# Patient Record
Sex: MALE | Race: WHITE | NOT HISPANIC OR LATINO | URBAN - METROPOLITAN AREA
[De-identification: names, ages, dates, MRNs, and addresses within clinical notes are randomized per-mention and may not be internally consistent; named-entity substitution may affect disease eponyms.]

---

## 2017-06-19 ENCOUNTER — EMERGENCY (EMERGENCY)
Facility: HOSPITAL | Age: 47
LOS: 1 days | End: 2017-06-19
Payer: COMMERCIAL

## 2017-06-19 PROCEDURE — 71010: CPT | Mod: 26

## 2017-06-19 PROCEDURE — 70450 CT HEAD/BRAIN W/O DYE: CPT | Mod: 26

## 2017-06-19 PROCEDURE — 99284 EMERGENCY DEPT VISIT MOD MDM: CPT

## 2020-02-11 ENCOUNTER — INPATIENT (INPATIENT)
Facility: HOSPITAL | Age: 50
LOS: 1 days | Discharge: HOME | End: 2020-02-13
Payer: COMMERCIAL

## 2020-02-11 PROCEDURE — 99285 EMERGENCY DEPT VISIT HI MDM: CPT

## 2020-02-11 PROCEDURE — 71045 X-RAY EXAM CHEST 1 VIEW: CPT | Mod: 26

## 2020-02-11 PROCEDURE — 76705 ECHO EXAM OF ABDOMEN: CPT | Mod: 26

## 2020-02-12 ENCOUNTER — OUTPATIENT (OUTPATIENT)
Dept: OUTPATIENT SERVICES | Facility: HOSPITAL | Age: 50
LOS: 1 days | End: 2020-02-12

## 2020-02-13 ENCOUNTER — OUTPATIENT (OUTPATIENT)
Dept: OUTPATIENT SERVICES | Facility: HOSPITAL | Age: 50
LOS: 1 days | End: 2020-02-13

## 2020-07-05 ENCOUNTER — APPOINTMENT (OUTPATIENT)
Dept: DISASTER EMERGENCY | Facility: CLINIC | Age: 50
End: 2020-07-05

## 2020-07-05 PROBLEM — Z00.00 ENCOUNTER FOR PREVENTIVE HEALTH EXAMINATION: Status: ACTIVE | Noted: 2020-07-05

## 2020-07-05 LAB — SARS-COV-2 N GENE NPH QL NAA+PROBE: NOT DETECTED

## 2020-07-08 ENCOUNTER — OUTPATIENT (OUTPATIENT)
Dept: OUTPATIENT SERVICES | Facility: HOSPITAL | Age: 50
LOS: 1 days | End: 2020-07-08

## 2020-08-02 ENCOUNTER — APPOINTMENT (OUTPATIENT)
Dept: DISASTER EMERGENCY | Facility: CLINIC | Age: 50
End: 2020-08-02

## 2020-08-02 DIAGNOSIS — Z01.818 ENCOUNTER FOR OTHER PREPROCEDURAL EXAMINATION: ICD-10-CM

## 2020-08-02 LAB — SARS-COV-2 N GENE NPH QL NAA+PROBE: NOT DETECTED

## 2020-08-05 ENCOUNTER — OUTPATIENT (OUTPATIENT)
Dept: OUTPATIENT SERVICES | Facility: HOSPITAL | Age: 50
LOS: 1 days | End: 2020-08-05

## 2020-12-23 ENCOUNTER — APPOINTMENT (OUTPATIENT)
Dept: NEUROLOGY | Facility: CLINIC | Age: 50
End: 2020-12-23
Payer: COMMERCIAL

## 2020-12-23 VITALS
HEART RATE: 110 BPM | BODY MASS INDEX: 31.08 KG/M2 | HEIGHT: 75 IN | WEIGHT: 250 LBS | DIASTOLIC BLOOD PRESSURE: 74 MMHG | TEMPERATURE: 97.6 F | SYSTOLIC BLOOD PRESSURE: 108 MMHG

## 2020-12-23 VITALS — HEART RATE: 130 BPM | DIASTOLIC BLOOD PRESSURE: 79 MMHG | SYSTOLIC BLOOD PRESSURE: 130 MMHG

## 2020-12-23 VITALS — DIASTOLIC BLOOD PRESSURE: 77 MMHG | HEART RATE: 106 BPM | SYSTOLIC BLOOD PRESSURE: 131 MMHG

## 2020-12-23 DIAGNOSIS — Z80.1 FAMILY HISTORY OF MALIGNANT NEOPLASM OF TRACHEA, BRONCHUS AND LUNG: ICD-10-CM

## 2020-12-23 DIAGNOSIS — R41.89 OTHER SYMPTOMS AND SIGNS INVOLVING COGNITIVE FUNCTIONS AND AWARENESS: ICD-10-CM

## 2020-12-23 DIAGNOSIS — Z78.9 OTHER SPECIFIED HEALTH STATUS: ICD-10-CM

## 2020-12-23 DIAGNOSIS — R56.9 UNSPECIFIED CONVULSIONS: ICD-10-CM

## 2020-12-23 PROCEDURE — 99204 OFFICE O/P NEW MOD 45 MIN: CPT

## 2020-12-23 PROCEDURE — 99072 ADDL SUPL MATRL&STAF TM PHE: CPT

## 2020-12-23 RX ORDER — ASPIRIN 81 MG
81 TABLET, DELAYED RELEASE (ENTERIC COATED) ORAL
Refills: 0 | Status: ACTIVE | COMMUNITY

## 2020-12-23 RX ORDER — INSULIN ASPART 100 [IU]/ML
INJECTION, SOLUTION INTRAVENOUS; SUBCUTANEOUS
Refills: 0 | Status: ACTIVE | COMMUNITY

## 2020-12-23 RX ORDER — ROSUVASTATIN CALCIUM 5 MG/1
5 TABLET, FILM COATED ORAL
Refills: 0 | Status: ACTIVE | COMMUNITY

## 2020-12-23 NOTE — HISTORY OF PRESENT ILLNESS
[FreeTextEntry1] : 50-year-old man right-handed history of diabetes and paroxysmal atrial fibrillation, and with his partner because of several months if not couple years old changes in behavior, more forgetful, inattentive, sometimes bizarre behavior. Difficulty with sleep, heavy snoring, symptoms will wake up, confused, babbling talking to people or not they appeared recurrent episodes of falls as well as transient loss of consciousness,usually brief period predominantly occurring when he stands up from a lying down or sitting down position, he reports feeling dizzy unsteady, usually improves as she walks. No history of tongue biting,or occasionally has sold himself with urine.no prior history of seizures, his  has noted sometimes tremor or shaking during some of his events. No associated episodes of transient loss of vision, unilateral weakness or numbness of extremities.\par He has history of diabetic neuropathy, numbness and tingling feelings of his feet, now his legs. No back pain going down the legs, or incontinence. His walking has slowed, mainly because of difficulty with balance.

## 2020-12-23 NOTE — DISCUSSION/SUMMARY
[FreeTextEntry1] : 50-year-old man with a history of diabetes, paroxysmal atrial fibrillation, complaining of recurrent episodes of syncopal episodes, likely postural related, rule out seizures. Evidence of orthostatic hypotension.ossibly diabetic autonomic polyneuropathy.\par Changes in behavior, forgetfulness, raises the possibility of an underlying cognitive disorder, early dementia, lateral and any intracranial pathology, hydrocephalus.\par History of heavy snoring, fatigue and tiredness, sometimes with acting out of dream as, possibly due to REM sleep disorder.\par advice to followup with his cardiologist, for possible treatment with orthostatic hypotension. Rule out intermittent A. fib as cause of his recurrent syncopal episodes.\par Plan: MRI of brain without contrast.\par 24 hour laboratory EEG\par Sleep study\par Referred for neuropsychological testing\par Return after the above.\par

## 2020-12-23 NOTE — REVIEW OF SYSTEMS
[Sleep Disturbances] : sleep disturbances [Anxiety] : anxiety [Depression] : depression [Change In Personality] : personality change [As Noted in HPI] : as noted in HPI [Confused or Disoriented] : confusion [Decr. Concentrating Ability] : decreased concentrating ability [Changed Thought Patterns] : changed thought patterns [Numbness] : numbness [Tingling] : tingling [Abnormal Sensation] : an abnormal sensation [Dizziness] : dizziness [Fainting] : fainting [Frequent Falls] : frequent falls [Negative] : Heme/Lymph [Emotional Problems] : no emotional problems [Arm Weakness] : no arm weakness [Hand Weakness] : no hand weakness [Leg Weakness] : no leg weakness [Difficulty Writing] : no difficulty writing [Difficulties in Speech] : no speech difficulties [Migraine Headache] : no migraine headache

## 2020-12-23 NOTE — PHYSICAL EXAM
[General Appearance - Alert] : alert [General Appearance - In No Acute Distress] : in no acute distress [Oriented To Time, Place, And Person] : oriented to person, place, and time [Impaired Insight] : insight and judgment were intact [Affect] : the affect was normal [Person] : oriented to person [Place] : oriented to place [Time] : oriented to time [Concentration Intact] : normal concentrating ability [Visual Intact] : visual attention was ~T not ~L decreased [Naming Objects] : no difficulty naming common objects [Repeating Phrases] : no difficulty repeating a phrase [Writing A Sentence] : no difficulty writing a sentence [Fluency] : fluency intact [Comprehension] : comprehension intact [Reading] : reading intact [Past History] : adequate knowledge of personal past history [Cranial Nerves Optic (II)] : visual acuity intact bilaterally,  visual fields full to confrontation, pupils equal round and reactive to light [Cranial Nerves Oculomotor (III)] : extraocular motion intact [Cranial Nerves Trigeminal (V)] : facial sensation intact symmetrically [Cranial Nerves Facial (VII)] : face symmetrical [Cranial Nerves Vestibulocochlear (VIII)] : hearing was intact bilaterally [Cranial Nerves Glossopharyngeal (IX)] : tongue and palate midline [Cranial Nerves Accessory (XI - Cranial And Spinal)] : head turning and shoulder shrug symmetric [Cranial Nerves Hypoglossal (XII)] : there was no tongue deviation with protrusion [Motor Strength] : muscle strength was normal in all four extremities [No Muscle Atrophy] : normal bulk in all four extremities [Motor Handedness Right-Handed] : the patient is right hand dominant [Sensation Tactile Decrease] : light touch was intact [Romberg's Sign] : a positive Romberg's sign was present [Vibration Decrease Leg / Foot Toes Both Feet] : decreased at the toes of both feet  [2+] : Ankle jerk left 2+ [Paresis Pronator Drift Right-Sided] : no pronator drift on the right [Past-pointing] : there was no past-pointing [Tremor] : no tremor present [Dysdiadochokinesia Bilaterally] : not present [Coordination - Dysmetria Impaired Finger-to-Nose Bilateral] : not present [1+] : Biceps left 1+ [0] : Ankle jerk right 0 [Plantar Reflex Right Only] : normal on the right [Plantar Reflex Left Only] : normal on the left [___] : absent on the right [___] : absent on the left [FreeTextEntry8] : wide-based gait [Sclera] : the sclera and conjunctiva were normal [PERRL With Normal Accommodation] : pupils were equal in size, round, reactive to light, with normal accommodation [Extraocular Movements] : extraocular movements were intact [Full Visual Field] : full visual field [Outer Ear] : the ears and nose were normal in appearance [Hearing Threshold Finger Rub Not [place]] : hearing was normal [Neck Appearance] : the appearance of the neck was normal [] : the neck was supple [Neck Cervical Mass (___cm)] : no neck mass was observed [Nonpitting Edema] : nonpitting edema present [___ +] : bilateral [unfilled]+ pitting edema to the ankles [No Spinal Tenderness] : no spinal tenderness [Nail Clubbing] : no clubbing  or cyanosis of the fingernails [Musculoskeletal - Swelling] : no joint swelling seen [Motor Tone] : muscle strength and tone were normal [Skin Color & Pigmentation] : normal skin color and pigmentation [Skin Turgor] : normal skin turgor

## 2021-01-18 ENCOUNTER — APPOINTMENT (OUTPATIENT)
Dept: MRI IMAGING | Facility: CLINIC | Age: 51
End: 2021-01-18
Payer: COMMERCIAL

## 2021-01-18 PROCEDURE — 70551 MRI BRAIN STEM W/O DYE: CPT

## 2021-02-09 ENCOUNTER — TRANSCRIPTION ENCOUNTER (OUTPATIENT)
Age: 51
End: 2021-02-09

## 2021-02-09 ENCOUNTER — APPOINTMENT (OUTPATIENT)
Dept: NEUROLOGY | Facility: CLINIC | Age: 51
End: 2021-02-09
Payer: COMMERCIAL

## 2021-02-09 PROCEDURE — 95719 EEG PHYS/QHP EA INCR W/O VID: CPT

## 2021-02-09 PROCEDURE — 95708 EEG WO VID EA 12-26HR UNMNTR: CPT

## 2021-02-09 PROCEDURE — 99072 ADDL SUPL MATRL&STAF TM PHE: CPT

## 2021-02-09 PROCEDURE — 95700 EEG CONT REC W/VID EEG TECH: CPT

## 2021-02-12 ENCOUNTER — APPOINTMENT (OUTPATIENT)
Dept: NEUROLOGY | Facility: CLINIC | Age: 51
End: 2021-02-12
Payer: COMMERCIAL

## 2021-02-12 ENCOUNTER — APPOINTMENT (OUTPATIENT)
Dept: NEUROLOGY | Facility: CLINIC | Age: 51
End: 2021-02-12

## 2021-02-12 PROCEDURE — 99072 ADDL SUPL MATRL&STAF TM PHE: CPT

## 2021-02-12 PROCEDURE — 95816 EEG AWAKE AND DROWSY: CPT

## 2021-02-19 ENCOUNTER — APPOINTMENT (OUTPATIENT)
Dept: NEUROLOGY | Facility: CLINIC | Age: 51
End: 2021-02-19

## 2021-02-22 ENCOUNTER — APPOINTMENT (OUTPATIENT)
Dept: NEUROLOGY | Facility: CLINIC | Age: 51
End: 2021-02-22

## 2021-03-01 ENCOUNTER — APPOINTMENT (OUTPATIENT)
Dept: NEUROLOGY | Facility: CLINIC | Age: 51
End: 2021-03-01
Payer: COMMERCIAL

## 2021-03-01 DIAGNOSIS — F32.9 MAJOR DEPRESSIVE DISORDER, SINGLE EPISODE, UNSPECIFIED: ICD-10-CM

## 2021-03-01 PROCEDURE — 99072 ADDL SUPL MATRL&STAF TM PHE: CPT

## 2021-03-01 PROCEDURE — 99214 OFFICE O/P EST MOD 30 MIN: CPT

## 2021-03-01 PROCEDURE — 95806 SLEEP STUDY UNATT&RESP EFFT: CPT

## 2021-03-01 NOTE — DATA REVIEWED
[de-identified] :  EXAM:  MR BRAIN  \par \par \par PROCEDURE DATE:  01/18/2021  \par  \par \par \par INTERPRETATION:  CLINICAL INDICATION: Falling\par \par \par Magnetic resonance imaging of the brain was carried out with transaxial SPGR, FLAIR, fast spin echo T2 weighted images, axial susceptibility weighted series, diffusion weighted series and sagittal T1 weighted series on a 1.5 [place] magnet.\par \par Comparison is made with the prior brain CT of 6/19/2017.\par \par Ventricular and sulcal prominence the patient's age suggests mild volume loss. Scattered small vessel white matter ischemic changes are noted. No acute infarcts or hemorrhage are identified. There are no intracranial masses. There is a right anterior temporal arachnoid cyst. The sellar and parasellar structures are unremarkable.\par \par There is a small right mastoid effusion.\par \par There has been previous bilateral lens replacement surgery.\par \par \par IMPRESSION: Volume loss for the patient's age. Small vessel white matter ischemic changes. No acute infarcts hemorrhage or mass. Right anterior temporal arachnoid cyst.\par \par  [de-identified] : electroencephalograph performed February 19 0.41. Impression: Normal EEG study wake and drowsy states.

## 2021-03-01 NOTE — DISCUSSION/SUMMARY
[FreeTextEntry1] : 50-year-old right-handed man the trouble with memory, appears depressed, referred for neurocognitive testing, but has not made the appointment.\par We'll request PET scan, rule out underlying dementia.\par  Start Pristiq 50 mg po QD\par Obtain neurocognitive testing.\par Past appointment for a sleep study today.\par Return to office, one month \par \par

## 2021-03-01 NOTE — PHYSICAL EXAM
[General Appearance - Alert] : alert [General Appearance - In No Acute Distress] : in no acute distress [FreeTextEntry1] : flat affect [Person] : oriented to person [Place] : oriented to place [Time] : oriented to time [Concentration Intact] : normal concentrating ability [Repeating Phrases] : no difficulty repeating a phrase [Fluency] : fluency intact [Cranial Nerves Optic (II)] : visual acuity intact bilaterally,  visual fields full to confrontation, pupils equal round and reactive to light [Cranial Nerves Oculomotor (III)] : extraocular motion intact [Cranial Nerves Trigeminal (V)] : facial sensation intact symmetrically [Cranial Nerves Facial (VII)] : face symmetrical [Cranial Nerves Vestibulocochlear (VIII)] : hearing was intact bilaterally [Cranial Nerves Glossopharyngeal (IX)] : tongue and palate midline [Cranial Nerves Accessory (XI - Cranial And Spinal)] : head turning and shoulder shrug symmetric [Cranial Nerves Hypoglossal (XII)] : there was no tongue deviation with protrusion [Motor Tone] : muscle tone was normal in all four extremities [Motor Strength] : muscle strength was normal in all four extremities [No Muscle Atrophy] : normal bulk in all four extremities [Motor Handedness Right-Handed] : the patient is right hand dominant [Sensation Tactile Decrease] : light touch was intact [Vibration Decrease Leg / Foot Toes Both Feet] : decreased at the toes of both feet  [Abnormal Walk] : normal gait [Dysdiadochokinesia Bilaterally] : not present [1+] : Biceps left 1+ [0] : Ankle jerk right 0 [2+] : Ankle jerk left 2+ [Plantar Reflex Right Only] : normal on the right [Plantar Reflex Left Only] : normal on the left [___] : absent on the right [___] : absent on the left [FreeTextEntry8] : wide-based gait

## 2021-03-01 NOTE — HISTORY OF PRESENT ILLNESS
[FreeTextEntry1] : 50-year-old right-handed man with a history of problem with memory, poor motivation, inattentiveness. Worsening of his 8 months to a year and a half, no history of trauma or injury, history of stroke seizures.\par Patiently trouble sleeping, heavy snoring not throughout the day, spouse complains he talks in his sleep nightly.\par Denies headache, no dizzy spells, at times trouble walking, and falling.\par Recent MRI of the brain demonstrates atrophy, periventricular white matter changes. Normal EEG.

## 2021-03-12 ENCOUNTER — INPATIENT (INPATIENT)
Facility: HOSPITAL | Age: 51
LOS: 2 days | Discharge: HOME | End: 2021-03-15
Admitting: FAMILY MEDICINE
Payer: COMMERCIAL

## 2021-03-12 ENCOUNTER — OUTPATIENT (OUTPATIENT)
Dept: OUTPATIENT SERVICES | Facility: HOSPITAL | Age: 51
LOS: 1 days | End: 2021-03-12

## 2021-03-12 PROCEDURE — 73130 X-RAY EXAM OF HAND: CPT | Mod: 26,LT

## 2021-03-12 PROCEDURE — 71045 X-RAY EXAM CHEST 1 VIEW: CPT | Mod: 26

## 2021-03-12 PROCEDURE — 70450 CT HEAD/BRAIN W/O DYE: CPT | Mod: 26

## 2021-03-12 PROCEDURE — 73564 X-RAY EXAM KNEE 4 OR MORE: CPT | Mod: 26,LT

## 2021-03-12 PROCEDURE — 73564 X-RAY EXAM KNEE 4 OR MORE: CPT | Mod: 26,RT,77

## 2021-03-12 PROCEDURE — 93010 ELECTROCARDIOGRAM REPORT: CPT

## 2021-03-12 PROCEDURE — 99285 EMERGENCY DEPT VISIT HI MDM: CPT

## 2021-03-13 ENCOUNTER — OUTPATIENT (OUTPATIENT)
Dept: OUTPATIENT SERVICES | Facility: HOSPITAL | Age: 51
LOS: 1 days | End: 2021-03-13

## 2021-03-13 PROCEDURE — 70547 MR ANGIOGRAPHY NECK W/O DYE: CPT | Mod: 26

## 2021-03-13 PROCEDURE — 73718 MRI LOWER EXTREMITY W/O DYE: CPT | Mod: 26,LT

## 2021-03-13 PROCEDURE — 70551 MRI BRAIN STEM W/O DYE: CPT | Mod: 26

## 2021-03-14 ENCOUNTER — OUTPATIENT (OUTPATIENT)
Dept: OUTPATIENT SERVICES | Facility: HOSPITAL | Age: 51
LOS: 1 days | End: 2021-03-14

## 2021-03-15 ENCOUNTER — OUTPATIENT (OUTPATIENT)
Dept: OUTPATIENT SERVICES | Facility: HOSPITAL | Age: 51
LOS: 1 days | End: 2021-03-15

## 2021-04-06 ENCOUNTER — APPOINTMENT (OUTPATIENT)
Dept: NEUROLOGY | Facility: CLINIC | Age: 51
End: 2021-04-06
Payer: COMMERCIAL

## 2021-04-06 VITALS — HEIGHT: 75 IN | WEIGHT: 250 LBS | TEMPERATURE: 97.8 F | BODY MASS INDEX: 31.08 KG/M2

## 2021-04-06 DIAGNOSIS — G47.52 REM SLEEP BEHAVIOR DISORDER: ICD-10-CM

## 2021-04-06 PROCEDURE — 99214 OFFICE O/P EST MOD 30 MIN: CPT

## 2021-04-06 PROCEDURE — 99072 ADDL SUPL MATRL&STAF TM PHE: CPT

## 2021-04-06 NOTE — DATA REVIEWED
[de-identified] :  EXAM:  MR ANGIO NECK      \par EXAM:  MR BRAIN      \par \par PROCEDURE DATE:  03/13/2021  \par \par \par \par \par INTERPRETATION:  Clinical indication: Syncope.\par \par MRI of the brain was performed using sagittal T1, axial T1 and fast spin-echo T2-weighted sequence of FLAIR diffusion and susceptibility weighted sequence.\par \par MRA the neck was formed using 2-D and 3-D time flight technique.\par \par This exam is compared with prior brain MRI performed on January 18, 2021\par \par Parenchymal volume loss and chronic microvascular ischemic changes are identified. There is extra-axial fluid identified in the right middle cranial fossa. This is seen medially and is unchanged when compared with the prior exam. This is compatible with an arachnoid cyst and measures approximately 3.9 x 2.8 cm.\par \par The large vessels demonstrate normal flow voids\par \par Bilateral maxillary sinus mucosal thickening is seen.\par \par Mild inflammatory change involving the left mastoid.\par \par Both distal common carotid, proximal internal and external carotid arteries appear normal as well as both vertebral arteries. No significant stenosis is seen.\par \par IMPRESSION: Arachnoid cyst as described above.\par \par Unremarkable MRA of the neck.\par \par \par \par \par  [de-identified] : 2/9/2021 Impression: normal awake and drowsy EEG. [de-identified] : Home sleep study. 3/1/2021. Impression: severe sleep disordered breathing in the form of obstructive sleep  apnea.

## 2021-04-06 NOTE — PHYSICAL EXAM
[General Appearance - Alert] : alert [General Appearance - In No Acute Distress] : in no acute distress [Person] : oriented to person [Place] : oriented to place [Time] : oriented to time [Concentration Intact] : normal concentrating ability [Fluency] : fluency intact [Cranial Nerves Optic (II)] : visual acuity intact bilaterally,  visual fields full to confrontation, pupils equal round and reactive to light [Cranial Nerves Oculomotor (III)] : extraocular motion intact [Cranial Nerves Trigeminal (V)] : facial sensation intact symmetrically [Cranial Nerves Facial (VII)] : face symmetrical [Cranial Nerves Vestibulocochlear (VIII)] : hearing was intact bilaterally [Cranial Nerves Glossopharyngeal (IX)] : tongue and palate midline [Cranial Nerves Accessory (XI - Cranial And Spinal)] : head turning and shoulder shrug symmetric [Cranial Nerves Hypoglossal (XII)] : there was no tongue deviation with protrusion [Motor Tone] : muscle tone was normal in all four extremities [Motor Strength] : muscle strength was normal in all four extremities [No Muscle Atrophy] : normal bulk in all four extremities [Motor Handedness Right-Handed] : the patient is right hand dominant [Paresis Pronator Drift Right-Sided] : no pronator drift on the right [Paresis Pronator Drift Left-Sided] : no pronator drift on the left [Vibration Decrease Leg / Foot Toes Both Feet] : decreased at the toes of both feet  [Abnormal Walk] : normal gait [Dysdiadochokinesia Bilaterally] : not present [Coordination - Dysmetria Impaired Finger-to-Nose Bilateral] : not present [1+] : Biceps left 1+ [0] : Ankle jerk right 0 [2+] : Ankle jerk left 2+ [Plantar Reflex Right Only] : normal on the right [Plantar Reflex Left Only] : normal on the left [___] : absent on the right [___] : absent on the left [FreeTextEntry8] : wide-based gait

## 2021-04-06 NOTE — REVIEW OF SYSTEMS
[Feeling Poorly] : feeling poorly [Feeling Tired] : feeling tired [Suicidal] : not suicidal [Sleep Disturbances] : sleep disturbances [Change In Personality] : personality change [Memory Lapses or Loss] : memory loss [Decr. Concentrating Ability] : decreased concentrating ability [Facial Weakness] : no facial weakness [Hand Weakness] : no hand weakness [Poor Coordination] : good coordination [Difficulty Writing] : no difficulty writing [Difficulties in Speech] : no speech difficulties [Dizziness] : dizziness [Fainting] : fainting [Frequent Falls] : frequent falls

## 2021-04-06 NOTE — DISCUSSION/SUMMARY
[FreeTextEntry1] : 50-year-old man with complaint of daytime fatigue, vivid dreams at night,recent evaluations including take on sleep study consistent with severe obstructive sleep apnea,possibly REM sleep disorder.\par Recurrent syncope,questionable orthostatic changes.Plan: We'll refer to sleep specialist.\par recommend to follow with cardiology,  assess PAF, doubt seizure disorder.\par Pending neurocognitive testing.\par Continue with Pristiq 50 mg po QD.\par RTO 6 weeks\par \par

## 2021-04-06 NOTE — HISTORY OF PRESENT ILLNESS
[FreeTextEntry1] : 50-year-old man history of diabetes PAF, yncopal episodes ere for followup visit. Recently admitted to the [facility] day hospital for syncopal episode, occur while urinating at night.Noticed to be in PAF. Complaints of daily fatigue and tiredness,intermittently unsteady, record falls.No fractures. during the night symptoms are worse, he is reportedly confused, very vivid dreams,no violence. Recent sleep study demonstrated findings consistent with severe obstructive sleep apnea. MRI of brain, mild atrophy, electroencephalogram was normal.\par has been placed on Pristiq 50 mg daily, he takes at night, reports keeps him awake at\par

## 2021-04-13 PROCEDURE — 70450 CT HEAD/BRAIN W/O DYE: CPT | Mod: 26

## 2021-04-13 PROCEDURE — 72125 CT NECK SPINE W/O DYE: CPT | Mod: 26

## 2021-04-13 PROCEDURE — 71045 X-RAY EXAM CHEST 1 VIEW: CPT | Mod: 26

## 2021-04-13 PROCEDURE — 93010 ELECTROCARDIOGRAM REPORT: CPT

## 2021-04-13 PROCEDURE — 99291 CRITICAL CARE FIRST HOUR: CPT

## 2021-04-14 ENCOUNTER — INPATIENT (INPATIENT)
Facility: HOSPITAL | Age: 51
LOS: 1 days | Discharge: HOME | End: 2021-04-16
Payer: COMMERCIAL

## 2021-04-14 ENCOUNTER — OUTPATIENT (OUTPATIENT)
Dept: OUTPATIENT SERVICES | Facility: HOSPITAL | Age: 51
LOS: 1 days | End: 2021-04-14

## 2021-04-15 ENCOUNTER — OUTPATIENT (OUTPATIENT)
Dept: OUTPATIENT SERVICES | Facility: HOSPITAL | Age: 51
LOS: 1 days | End: 2021-04-15

## 2021-04-15 PROCEDURE — 76770 US EXAM ABDO BACK WALL COMP: CPT | Mod: 26

## 2021-04-16 ENCOUNTER — OUTPATIENT (OUTPATIENT)
Dept: OUTPATIENT SERVICES | Facility: HOSPITAL | Age: 51
LOS: 1 days | End: 2021-04-16

## 2021-04-26 ENCOUNTER — APPOINTMENT (OUTPATIENT)
Dept: NEUROLOGY | Facility: CLINIC | Age: 51
End: 2021-04-26
Payer: COMMERCIAL

## 2021-04-26 VITALS
HEIGHT: 75 IN | BODY MASS INDEX: 31.08 KG/M2 | SYSTOLIC BLOOD PRESSURE: 128 MMHG | DIASTOLIC BLOOD PRESSURE: 80 MMHG | HEART RATE: 98 BPM | WEIGHT: 250 LBS | TEMPERATURE: 97.8 F

## 2021-04-26 DIAGNOSIS — G47.00 INSOMNIA, UNSPECIFIED: ICD-10-CM

## 2021-04-26 DIAGNOSIS — G47.33 OBSTRUCTIVE SLEEP APNEA (ADULT) (PEDIATRIC): ICD-10-CM

## 2021-04-26 PROCEDURE — 99072 ADDL SUPL MATRL&STAF TM PHE: CPT

## 2021-04-26 PROCEDURE — 99215 OFFICE O/P EST HI 40 MIN: CPT

## 2021-04-26 NOTE — PHYSICAL EXAM
[FreeTextEntry1] : Examination:\par Constitutional: normal, no apparent distress\par Eyes: normal conjunctiva b/l, no ptosis, visual fields full\par Respiratory: no respiratory distress, normal effort, normal auscultation\par Cardiovascular: normal rate, rhythm, no murmurs\par Neck: supple, no masses\par Vascular: carotids normal\par Skin: normal color, no rashes\par Psych: normal mood, affect\par \par Neurological:\par Memory: normal memory, oriented to person, place, time\par Language intact/no aphasia\par Cranial Nerves: II-XII intact, Pupils equally round and reactive to light, ocular muscles/movements intact, no ptosis, no facial weakness, tongue protrudes normally in the midline, \par Motor: normal tone, no pronator drift, full strength in all four extremities in the proximal and distal muscle groups\par Coordination: Fine motor movements intact, rapid alternating movements intact, finger to nose intact bilaterally\par Sensory: decreased sensation in stocking distribution\par DTRs: symmetric, 1+ in b/l triceps, 1+ in b/l biceps, 1+ in b/l brachioradialis, trace in bilateral patellars\par Gait: narrow based, steady\par \par

## 2021-04-26 NOTE — DISCUSSION/SUMMARY
[FreeTextEntry1] : Mr. Caceres is a 50 year man with diabetes and frequent episodes of loss of consciousness with chronic difficulties sleeping.\par \par Obstructive Sleep Apnea\par -He had a home sleep study showing severe DAYANARA with an AHI of 43.6.\par - We discussed the risks of untreated sleep apnea including sleepiness, hypertension, increased risk for heart disease and stroke, worsening seizure frequency and cognitive impairment.\par Untreated DAYANARA is also a risk for him reverting back to atrial fibrillation.\par Since he has severe DAYANARA, I recommend a trial of PAP.\par I will place in order with a durable medical equipment company.  I told him that if he does not hear from the company within 2 weeks he should call our office.  Once he receives her machine she should follow up one month later to discuss any issues.\par I explained that he can use the PAP in the evening while awake and watching television or relaxation for desensitization.\par \par \par Insomnia\par Difficulty maintaining sleep may be at least in part due to severe DAYANARA.\par Treatment of DAYANARA as above which may also help with nocturia.\par \par We discussed the following recommendations to improve sleep hygiene and insomnia.\par - The bed should only be used for sleep and intimacy. No reading or watching television in bed.\par -  Avoid trying to sleep/go to bed only when sleepy. If you cannot fall asleep at the beginning of the night or in the middle of the night get out of bed after 15 minutes and do something relaxing (read, watch television, etc.)\par -  Wake up at the same time every day.\par -  No naps during the day\par -  No caffeine after noon \par -  Do not watch the clock at night.\par - Avoid direct sunlight late in the day/ wear sunglasses after 4 PM\par - Do not use the computer/tablets for 1-2 hours prior to bedtime\par - Schedule thinking time early in the evening and have relaxation time for one hour before bed\par - Practice relaxation training that can be done at night if you cannot sleep\par - Exercise for 20 minutes in the late afternoon/early evening or take a hot bath 2-4 hours before bedtime\par \par \par f/u 2-3 months, sooner if needed.

## 2021-04-26 NOTE — DATA REVIEWED
[de-identified] : Home sleep study 3/1/21:\par AHI 43.6. Not enough time spent in the non-supine position to determine if positional influence is a significant factor.

## 2021-04-26 NOTE — HISTORY OF PRESENT ILLNESS
[FreeTextEntry1] : Mr. Caceres was referred by Dr. Bonilla for difficulty sleeping.\par He reports years of difficulty falling and staying asleep.\par He goes to bed at 10 PM. He watches television and tries to fall asleep.\par He feels tired but does not fall asleep right away. He reports that it takes at least 30 minutes to fall asleep.\par He has diabetes and frequently needs to use the bathroom.\par Sometimes when he gets up to use the bathroom he falls/passes out.\par It takes him time to fall back to sleep.\par He wakes up at least 3-4 times per night.\par He gets out of bed at 7 AM.\par \par He sometimes takes a nap after he finishes work at 4 PM. \par \par He does not drink caffeine.\par \par He has been told that he snores.\par He is not aware of waking feeling like he is choking or gasping for air.\par He typically sleeps on his back.\par \par He has neuropathy but denies symptoms of RLS.\par \par There is no history of dream enactment behavior, hypnagogic/hypnopompic hallucinations, sleep paralysis or cataplexy.\par \par He is conscious of getting out of bed tonight to use the bathroom.\par He is not sleepwalking.\par \par He had a home sleep study which showed severe DAYANARA with an AHI of 43.6.\par \par His [place] Sleepiness Scale Score is 5/24.\par

## 2021-07-21 ENCOUNTER — APPOINTMENT (OUTPATIENT)
Dept: NEUROLOGY | Facility: CLINIC | Age: 51
End: 2021-07-21

## 2021-07-28 ENCOUNTER — APPOINTMENT (OUTPATIENT)
Dept: NEUROLOGY | Facility: CLINIC | Age: 51
End: 2021-07-28

## 2021-08-07 ENCOUNTER — INPATIENT (INPATIENT)
Facility: HOSPITAL | Age: 51
LOS: 2 days | Discharge: HOME | End: 2021-08-10
Payer: COMMERCIAL

## 2021-08-07 PROCEDURE — 71045 X-RAY EXAM CHEST 1 VIEW: CPT | Mod: 26

## 2021-08-07 PROCEDURE — 74176 CT ABD & PELVIS W/O CONTRAST: CPT | Mod: 26

## 2021-08-07 PROCEDURE — 72125 CT NECK SPINE W/O DYE: CPT | Mod: 26

## 2021-08-07 PROCEDURE — 70486 CT MAXILLOFACIAL W/O DYE: CPT | Mod: 26

## 2021-08-07 PROCEDURE — 72170 X-RAY EXAM OF PELVIS: CPT | Mod: 26

## 2021-08-07 PROCEDURE — 70450 CT HEAD/BRAIN W/O DYE: CPT | Mod: 26

## 2021-08-07 PROCEDURE — 76700 US EXAM ABDOM COMPLETE: CPT | Mod: 26

## 2021-08-07 PROCEDURE — 93010 ELECTROCARDIOGRAM REPORT: CPT

## 2021-08-07 PROCEDURE — 99291 CRITICAL CARE FIRST HOUR: CPT

## 2021-08-08 PROCEDURE — 73030 X-RAY EXAM OF SHOULDER: CPT | Mod: 26,RT

## 2021-08-08 PROCEDURE — 74181 MRI ABDOMEN W/O CONTRAST: CPT | Mod: 26

## 2021-08-08 PROCEDURE — 70551 MRI BRAIN STEM W/O DYE: CPT | Mod: 26

## 2021-08-09 ENCOUNTER — OUTPATIENT (OUTPATIENT)
Dept: OUTPATIENT SERVICES | Facility: HOSPITAL | Age: 51
LOS: 1 days | End: 2021-08-09

## 2021-08-09 PROCEDURE — 93306 TTE W/DOPPLER COMPLETE: CPT | Mod: 26

## 2021-08-10 ENCOUNTER — OUTPATIENT (OUTPATIENT)
Dept: OUTPATIENT SERVICES | Facility: HOSPITAL | Age: 51
LOS: 1 days | End: 2021-08-10

## 2021-09-27 ENCOUNTER — RX RENEWAL (OUTPATIENT)
Age: 51
End: 2021-09-27

## 2021-09-27 RX ORDER — DESVENLAFAXINE 50 MG/1
50 TABLET, EXTENDED RELEASE ORAL
Qty: 30 | Refills: 1 | Status: ACTIVE | COMMUNITY
Start: 2021-03-01 | End: 1900-01-01

## 2021-10-19 ENCOUNTER — INPATIENT (INPATIENT)
Facility: HOSPITAL | Age: 51
LOS: 2 days | Discharge: NURSING, REHAB OR LONG-TERM CARE | End: 2021-10-22
Payer: COMMERCIAL

## 2021-10-19 PROCEDURE — 71045 X-RAY EXAM CHEST 1 VIEW: CPT | Mod: 26

## 2021-10-19 PROCEDURE — 99285 EMERGENCY DEPT VISIT HI MDM: CPT

## 2021-10-19 PROCEDURE — 93010 ELECTROCARDIOGRAM REPORT: CPT

## 2021-10-19 PROCEDURE — 73502 X-RAY EXAM HIP UNI 2-3 VIEWS: CPT | Mod: 26,LT

## 2021-11-29 ENCOUNTER — OUTPATIENT (OUTPATIENT)
Dept: OUTPATIENT SERVICES | Facility: HOSPITAL | Age: 51
LOS: 1 days | End: 2021-11-29

## 2021-11-29 ENCOUNTER — INPATIENT (INPATIENT)
Facility: HOSPITAL | Age: 51
LOS: 5 days | Discharge: HOME | End: 2021-12-05
Attending: STUDENT IN AN ORGANIZED HEALTH CARE EDUCATION/TRAINING PROGRAM | Admitting: STUDENT IN AN ORGANIZED HEALTH CARE EDUCATION/TRAINING PROGRAM
Payer: COMMERCIAL

## 2021-11-29 PROCEDURE — 93010 ELECTROCARDIOGRAM REPORT: CPT

## 2021-11-29 PROCEDURE — 99285 EMERGENCY DEPT VISIT HI MDM: CPT

## 2021-11-29 PROCEDURE — 70450 CT HEAD/BRAIN W/O DYE: CPT | Mod: 26

## 2021-11-29 PROCEDURE — 71046 X-RAY EXAM CHEST 2 VIEWS: CPT | Mod: 26

## 2021-11-30 ENCOUNTER — OUTPATIENT (OUTPATIENT)
Dept: OUTPATIENT SERVICES | Facility: HOSPITAL | Age: 51
LOS: 1 days | End: 2021-11-30

## 2021-12-01 ENCOUNTER — OUTPATIENT (OUTPATIENT)
Dept: OUTPATIENT SERVICES | Facility: HOSPITAL | Age: 51
LOS: 1 days | End: 2021-12-01

## 2021-12-02 ENCOUNTER — OUTPATIENT (OUTPATIENT)
Dept: OUTPATIENT SERVICES | Facility: HOSPITAL | Age: 51
LOS: 1 days | End: 2021-12-02

## 2021-12-03 ENCOUNTER — OUTPATIENT (OUTPATIENT)
Dept: OUTPATIENT SERVICES | Facility: HOSPITAL | Age: 51
LOS: 1 days | End: 2021-12-03

## 2021-12-05 ENCOUNTER — OUTPATIENT (OUTPATIENT)
Dept: OUTPATIENT SERVICES | Facility: HOSPITAL | Age: 51
LOS: 1 days | End: 2021-12-05

## 2021-12-28 ENCOUNTER — OUTPATIENT (OUTPATIENT)
Dept: OUTPATIENT SERVICES | Facility: HOSPITAL | Age: 51
LOS: 1 days | End: 2021-12-28

## 2022-01-18 ENCOUNTER — RX RENEWAL (OUTPATIENT)
Age: 52
End: 2022-01-18

## 2022-07-01 ENCOUNTER — EMERGENCY (EMERGENCY)
Facility: HOSPITAL | Age: 52
LOS: 1 days | Discharge: HOME | End: 2022-07-01
Admitting: EMERGENCY MEDICINE

## 2022-07-01 DIAGNOSIS — S72.8X2A OTHER FRACTURE OF LEFT FEMUR, INITIAL ENCOUNTER FOR CLOSED FRACTURE: ICD-10-CM
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
## 2022-07-01 DIAGNOSIS — Z20.822 CONTACT WITH AND (SUSPECTED) EXPOSURE TO COVID-19: ICD-10-CM

## 2022-07-01 DIAGNOSIS — M97.01XA PERIPROSTHETIC FRACTURE AROUND INTERNAL PROSTHETIC RIGHT HIP JOINT, INITIAL ENCOUNTER: ICD-10-CM

## 2022-07-01 DIAGNOSIS — M25.552 PAIN IN LEFT HIP: ICD-10-CM
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
## 2022-07-01 DIAGNOSIS — I10 ESSENTIAL (PRIMARY) HYPERTENSION: ICD-10-CM

## 2022-07-01 DIAGNOSIS — R55 SYNCOPE AND COLLAPSE: ICD-10-CM

## 2022-07-01 DIAGNOSIS — R94.31 ABNORMAL ELECTROCARDIOGRAM [ECG] [EKG]: ICD-10-CM

## 2022-07-01 DIAGNOSIS — N28.89 OTHER SPECIFIED DISORDERS OF KIDNEY AND URETER: ICD-10-CM

## 2022-07-01 DIAGNOSIS — I48.0 PAROXYSMAL ATRIAL FIBRILLATION: ICD-10-CM
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
## 2022-07-01 PROCEDURE — 99285 EMERGENCY DEPT VISIT HI MDM: CPT

## 2022-07-02 ENCOUNTER — INPATIENT (INPATIENT)
Facility: HOSPITAL | Age: 52
LOS: 17 days | Discharge: HOME | DRG: 481 | End: 2022-07-20
Attending: HOSPITALIST | Admitting: STUDENT IN AN ORGANIZED HEALTH CARE EDUCATION/TRAINING PROGRAM
Payer: COMMERCIAL

## 2022-07-02 VITALS
SYSTOLIC BLOOD PRESSURE: 143 MMHG | OXYGEN SATURATION: 99 % | HEART RATE: 76 BPM | TEMPERATURE: 98 F | WEIGHT: 229.94 LBS | HEIGHT: 75 IN | RESPIRATION RATE: 18 BRPM | DIASTOLIC BLOOD PRESSURE: 84 MMHG

## 2022-07-02 DIAGNOSIS — Z96.642 PRESENCE OF LEFT ARTIFICIAL HIP JOINT: Chronic | ICD-10-CM

## 2022-07-02 DIAGNOSIS — S72.002A FRACTURE OF UNSPECIFIED PART OF NECK OF LEFT FEMUR, INITIAL ENCOUNTER FOR CLOSED FRACTURE: ICD-10-CM

## 2022-07-02 LAB
24R-OH-CALCIDIOL SERPL-MCNC: 37.3 NG/ML — SIGNIFICANT CHANGE UP (ref 30–80)
ALBUMIN SERPL ELPH-MCNC: 3.5 G/DL — SIGNIFICANT CHANGE UP (ref 3.3–5.2)
ALP SERPL-CCNC: 373 U/L — HIGH (ref 40–120)
ALT FLD-CCNC: 30 U/L — SIGNIFICANT CHANGE UP
ANION GAP SERPL CALC-SCNC: 12 MMOL/L — SIGNIFICANT CHANGE UP (ref 5–17)
APTT BLD: 31.2 SEC — SIGNIFICANT CHANGE UP (ref 27.5–35.5)
AST SERPL-CCNC: 40 U/L — HIGH
BASOPHILS # BLD AUTO: 0.03 K/UL — SIGNIFICANT CHANGE UP (ref 0–0.2)
BASOPHILS NFR BLD AUTO: 0.4 % — SIGNIFICANT CHANGE UP (ref 0–2)
BILIRUB SERPL-MCNC: 0.6 MG/DL — SIGNIFICANT CHANGE UP (ref 0.4–2)
BLD GP AB SCN SERPL QL: SIGNIFICANT CHANGE UP
BUN SERPL-MCNC: 26.4 MG/DL — HIGH (ref 8–20)
CALCIUM SERPL-MCNC: 9.2 MG/DL — SIGNIFICANT CHANGE UP (ref 8.6–10.2)
CHLORIDE SERPL-SCNC: 95 MMOL/L — LOW (ref 98–107)
CO2 SERPL-SCNC: 23 MMOL/L — SIGNIFICANT CHANGE UP (ref 22–29)
CREAT SERPL-MCNC: 1.56 MG/DL — HIGH (ref 0.5–1.3)
CRP SERPL-MCNC: 13 MG/L — HIGH
EGFR: 53 ML/MIN/1.73M2 — LOW
EOSINOPHIL # BLD AUTO: 0.06 K/UL — SIGNIFICANT CHANGE UP (ref 0–0.5)
EOSINOPHIL NFR BLD AUTO: 0.7 % — SIGNIFICANT CHANGE UP (ref 0–6)
ERYTHROCYTE [SEDIMENTATION RATE] IN BLOOD: 49 MM/HR — HIGH (ref 0–20)
GLUCOSE BLDC GLUCOMTR-MCNC: 171 MG/DL — HIGH (ref 70–99)
GLUCOSE BLDC GLUCOMTR-MCNC: 198 MG/DL — HIGH (ref 70–99)
GLUCOSE BLDC GLUCOMTR-MCNC: 243 MG/DL — HIGH (ref 70–99)
GLUCOSE SERPL-MCNC: 256 MG/DL — HIGH (ref 70–99)
HCT VFR BLD CALC: 35.8 % — LOW (ref 39–50)
HGB BLD-MCNC: 11.7 G/DL — LOW (ref 13–17)
IMM GRANULOCYTES NFR BLD AUTO: 0.5 % — SIGNIFICANT CHANGE UP (ref 0–1.5)
INR BLD: 1.09 RATIO — SIGNIFICANT CHANGE UP (ref 0.88–1.16)
LYMPHOCYTES # BLD AUTO: 0.96 K/UL — LOW (ref 1–3.3)
LYMPHOCYTES # BLD AUTO: 11.4 % — LOW (ref 13–44)
MCHC RBC-ENTMCNC: 30.6 PG — SIGNIFICANT CHANGE UP (ref 27–34)
MCHC RBC-ENTMCNC: 32.7 GM/DL — SIGNIFICANT CHANGE UP (ref 32–36)
MCV RBC AUTO: 93.7 FL — SIGNIFICANT CHANGE UP (ref 80–100)
MONOCYTES # BLD AUTO: 1.03 K/UL — HIGH (ref 0–0.9)
MONOCYTES NFR BLD AUTO: 12.2 % — SIGNIFICANT CHANGE UP (ref 2–14)
NEUTROPHILS # BLD AUTO: 6.29 K/UL — SIGNIFICANT CHANGE UP (ref 1.8–7.4)
NEUTROPHILS NFR BLD AUTO: 74.8 % — SIGNIFICANT CHANGE UP (ref 43–77)
PLATELET # BLD AUTO: 200 K/UL — SIGNIFICANT CHANGE UP (ref 150–400)
POTASSIUM SERPL-MCNC: 5.1 MMOL/L — SIGNIFICANT CHANGE UP (ref 3.5–5.3)
POTASSIUM SERPL-SCNC: 5.1 MMOL/L — SIGNIFICANT CHANGE UP (ref 3.5–5.3)
PROT SERPL-MCNC: 7.2 G/DL — SIGNIFICANT CHANGE UP (ref 6.6–8.7)
PROTHROM AB SERPL-ACNC: 12.6 SEC — SIGNIFICANT CHANGE UP (ref 10.5–13.4)
RBC # BLD: 3.82 M/UL — LOW (ref 4.2–5.8)
RBC # FLD: 13.2 % — SIGNIFICANT CHANGE UP (ref 10.3–14.5)
SARS-COV-2 RNA SPEC QL NAA+PROBE: SIGNIFICANT CHANGE UP
SODIUM SERPL-SCNC: 130 MMOL/L — LOW (ref 135–145)
WBC # BLD: 8.41 K/UL — SIGNIFICANT CHANGE UP (ref 3.8–10.5)
WBC # FLD AUTO: 8.41 K/UL — SIGNIFICANT CHANGE UP (ref 3.8–10.5)

## 2022-07-02 PROCEDURE — 99285 EMERGENCY DEPT VISIT HI MDM: CPT

## 2022-07-02 PROCEDURE — 93010 ELECTROCARDIOGRAM REPORT: CPT

## 2022-07-02 PROCEDURE — 99223 1ST HOSP IP/OBS HIGH 75: CPT

## 2022-07-02 PROCEDURE — 72170 X-RAY EXAM OF PELVIS: CPT | Mod: 26,59

## 2022-07-02 PROCEDURE — 73502 X-RAY EXAM HIP UNI 2-3 VIEWS: CPT | Mod: 26,LT,77

## 2022-07-02 PROCEDURE — 73502 X-RAY EXAM HIP UNI 2-3 VIEWS: CPT | Mod: 26,LT

## 2022-07-02 PROCEDURE — 72192 CT PELVIS W/O DYE: CPT | Mod: 26

## 2022-07-02 PROCEDURE — 73552 X-RAY EXAM OF FEMUR 2/>: CPT | Mod: 26,LT

## 2022-07-02 PROCEDURE — 71045 X-RAY EXAM CHEST 1 VIEW: CPT | Mod: 26

## 2022-07-02 RX ORDER — FERROUS SULFATE 325(65) MG
325 TABLET ORAL THREE TIMES A DAY
Refills: 0 | Status: DISCONTINUED | OUTPATIENT
Start: 2022-07-02 | End: 2022-07-05

## 2022-07-02 RX ORDER — INSULIN GLARGINE 100 [IU]/ML
17 INJECTION, SOLUTION SUBCUTANEOUS EVERY MORNING
Refills: 0 | Status: DISCONTINUED | OUTPATIENT
Start: 2022-07-02 | End: 2022-07-05

## 2022-07-02 RX ORDER — MORPHINE SULFATE 50 MG/1
4 CAPSULE, EXTENDED RELEASE ORAL EVERY 4 HOURS
Refills: 0 | Status: DISCONTINUED | OUTPATIENT
Start: 2022-07-02 | End: 2022-07-05

## 2022-07-02 RX ORDER — PREGABALIN 225 MG/1
1000 CAPSULE ORAL DAILY
Refills: 0 | Status: DISCONTINUED | OUTPATIENT
Start: 2022-07-02 | End: 2022-07-05

## 2022-07-02 RX ORDER — FOLIC ACID 0.8 MG
1 TABLET ORAL DAILY
Refills: 0 | Status: DISCONTINUED | OUTPATIENT
Start: 2022-07-02 | End: 2022-07-05

## 2022-07-02 RX ORDER — ROSUVASTATIN CALCIUM 5 MG/1
1 TABLET ORAL
Qty: 0 | Refills: 0 | DISCHARGE

## 2022-07-02 RX ORDER — CHOLECALCIFEROL (VITAMIN D3) 125 MCG
1000 CAPSULE ORAL DAILY
Refills: 0 | Status: DISCONTINUED | OUTPATIENT
Start: 2022-07-02 | End: 2022-07-05

## 2022-07-02 RX ORDER — INSULIN ASPART 100 [IU]/ML
7 INJECTION, SOLUTION SUBCUTANEOUS
Qty: 0 | Refills: 0 | DISCHARGE

## 2022-07-02 RX ORDER — FOLIC ACID 0.8 MG
1 TABLET ORAL
Qty: 0 | Refills: 0 | DISCHARGE

## 2022-07-02 RX ORDER — FENTANYL CITRATE 50 UG/ML
50 INJECTION INTRAVENOUS ONCE
Refills: 0 | Status: DISCONTINUED | OUTPATIENT
Start: 2022-07-02 | End: 2022-07-02

## 2022-07-02 RX ORDER — LEVOTHYROXINE SODIUM 125 MCG
50 TABLET ORAL DAILY
Refills: 0 | Status: DISCONTINUED | OUTPATIENT
Start: 2022-07-02 | End: 2022-07-05

## 2022-07-02 RX ORDER — ATORVASTATIN CALCIUM 80 MG/1
20 TABLET, FILM COATED ORAL
Refills: 0 | Status: DISCONTINUED | OUTPATIENT
Start: 2022-07-02 | End: 2022-07-05

## 2022-07-02 RX ORDER — INSULIN LISPRO 100/ML
VIAL (ML) SUBCUTANEOUS
Refills: 0 | Status: DISCONTINUED | OUTPATIENT
Start: 2022-07-02 | End: 2022-07-05

## 2022-07-02 RX ORDER — SODIUM CHLORIDE 9 MG/ML
1000 INJECTION, SOLUTION INTRAVENOUS
Refills: 0 | Status: DISCONTINUED | OUTPATIENT
Start: 2022-07-02 | End: 2022-07-05

## 2022-07-02 RX ORDER — INSULIN GLARGINE 100 [IU]/ML
17 INJECTION, SOLUTION SUBCUTANEOUS
Qty: 0 | Refills: 0 | DISCHARGE

## 2022-07-02 RX ORDER — MIDODRINE HYDROCHLORIDE 2.5 MG/1
1 TABLET ORAL
Qty: 0 | Refills: 0 | DISCHARGE

## 2022-07-02 RX ORDER — DEXTROSE 50 % IN WATER 50 %
25 SYRINGE (ML) INTRAVENOUS ONCE
Refills: 0 | Status: DISCONTINUED | OUTPATIENT
Start: 2022-07-02 | End: 2022-07-05

## 2022-07-02 RX ORDER — CHOLECALCIFEROL (VITAMIN D3) 125 MCG
1 CAPSULE ORAL
Qty: 0 | Refills: 0 | DISCHARGE

## 2022-07-02 RX ORDER — INSULIN GLARGINE 100 [IU]/ML
17 INJECTION, SOLUTION SUBCUTANEOUS AT BEDTIME
Refills: 0 | Status: DISCONTINUED | OUTPATIENT
Start: 2022-07-02 | End: 2022-07-05

## 2022-07-02 RX ORDER — HYDROMORPHONE HYDROCHLORIDE 2 MG/ML
0.5 INJECTION INTRAMUSCULAR; INTRAVENOUS; SUBCUTANEOUS ONCE
Refills: 0 | Status: DISCONTINUED | OUTPATIENT
Start: 2022-07-02 | End: 2022-07-02

## 2022-07-02 RX ORDER — DESVENLAFAXINE 50 MG/1
1 TABLET, EXTENDED RELEASE ORAL
Qty: 0 | Refills: 0 | DISCHARGE

## 2022-07-02 RX ORDER — DEXTROSE 50 % IN WATER 50 %
15 SYRINGE (ML) INTRAVENOUS ONCE
Refills: 0 | Status: DISCONTINUED | OUTPATIENT
Start: 2022-07-02 | End: 2022-07-05

## 2022-07-02 RX ORDER — ACETAMINOPHEN 500 MG
650 TABLET ORAL EVERY 6 HOURS
Refills: 0 | Status: DISCONTINUED | OUTPATIENT
Start: 2022-07-02 | End: 2022-07-05

## 2022-07-02 RX ORDER — MIDODRINE HYDROCHLORIDE 2.5 MG/1
10 TABLET ORAL THREE TIMES A DAY
Refills: 0 | Status: DISCONTINUED | OUTPATIENT
Start: 2022-07-02 | End: 2022-07-05

## 2022-07-02 RX ORDER — CALCIUM CARBONATE 500(1250)
1 TABLET ORAL
Refills: 0 | Status: DISCONTINUED | OUTPATIENT
Start: 2022-07-02 | End: 2022-07-05

## 2022-07-02 RX ORDER — LANOLIN ALCOHOL/MO/W.PET/CERES
3 CREAM (GRAM) TOPICAL AT BEDTIME
Refills: 0 | Status: DISCONTINUED | OUTPATIENT
Start: 2022-07-02 | End: 2022-07-05

## 2022-07-02 RX ORDER — ASPIRIN/CALCIUM CARB/MAGNESIUM 324 MG
1 TABLET ORAL
Qty: 0 | Refills: 0 | DISCHARGE

## 2022-07-02 RX ORDER — FERROUS SULFATE 325(65) MG
1 TABLET ORAL
Qty: 0 | Refills: 0 | DISCHARGE

## 2022-07-02 RX ORDER — ENOXAPARIN SODIUM 100 MG/ML
40 INJECTION SUBCUTANEOUS EVERY 24 HOURS
Refills: 0 | Status: DISCONTINUED | OUTPATIENT
Start: 2022-07-02 | End: 2022-07-04

## 2022-07-02 RX ORDER — GLUCAGON INJECTION, SOLUTION 0.5 MG/.1ML
1 INJECTION, SOLUTION SUBCUTANEOUS ONCE
Refills: 0 | Status: DISCONTINUED | OUTPATIENT
Start: 2022-07-02 | End: 2022-07-05

## 2022-07-02 RX ORDER — DEXTROSE 50 % IN WATER 50 %
12.5 SYRINGE (ML) INTRAVENOUS ONCE
Refills: 0 | Status: DISCONTINUED | OUTPATIENT
Start: 2022-07-02 | End: 2022-07-05

## 2022-07-02 RX ORDER — PREGABALIN 225 MG/1
1 CAPSULE ORAL
Qty: 0 | Refills: 0 | DISCHARGE

## 2022-07-02 RX ORDER — CHOLECALCIFEROL (VITAMIN D3) 125 MCG
5000 CAPSULE ORAL DAILY
Refills: 0 | Status: DISCONTINUED | OUTPATIENT
Start: 2022-07-02 | End: 2022-07-05

## 2022-07-02 RX ORDER — INSULIN LISPRO 100/ML
7 VIAL (ML) SUBCUTANEOUS
Refills: 0 | Status: DISCONTINUED | OUTPATIENT
Start: 2022-07-02 | End: 2022-07-05

## 2022-07-02 RX ORDER — AMIODARONE HYDROCHLORIDE 400 MG/1
1 TABLET ORAL
Qty: 0 | Refills: 0 | DISCHARGE

## 2022-07-02 RX ORDER — AMIODARONE HYDROCHLORIDE 400 MG/1
200 TABLET ORAL DAILY
Refills: 0 | Status: DISCONTINUED | OUTPATIENT
Start: 2022-07-02 | End: 2022-07-05

## 2022-07-02 RX ORDER — INSULIN LISPRO 100/ML
VIAL (ML) SUBCUTANEOUS AT BEDTIME
Refills: 0 | Status: DISCONTINUED | OUTPATIENT
Start: 2022-07-02 | End: 2022-07-05

## 2022-07-02 RX ORDER — MORPHINE SULFATE 50 MG/1
2 CAPSULE, EXTENDED RELEASE ORAL EVERY 4 HOURS
Refills: 0 | Status: DISCONTINUED | OUTPATIENT
Start: 2022-07-02 | End: 2022-07-05

## 2022-07-02 RX ORDER — ONDANSETRON 8 MG/1
4 TABLET, FILM COATED ORAL EVERY 8 HOURS
Refills: 0 | Status: DISCONTINUED | OUTPATIENT
Start: 2022-07-02 | End: 2022-07-05

## 2022-07-02 RX ADMIN — ATORVASTATIN CALCIUM 20 MILLIGRAM(S): 80 TABLET, FILM COATED ORAL at 22:47

## 2022-07-02 RX ADMIN — Medication 2: at 12:02

## 2022-07-02 RX ADMIN — ENOXAPARIN SODIUM 40 MILLIGRAM(S): 100 INJECTION SUBCUTANEOUS at 10:25

## 2022-07-02 RX ADMIN — Medication 7 UNIT(S): at 12:02

## 2022-07-02 RX ADMIN — FENTANYL CITRATE 50 MICROGRAM(S): 50 INJECTION INTRAVENOUS at 09:02

## 2022-07-02 RX ADMIN — HYDROMORPHONE HYDROCHLORIDE 0.5 MILLIGRAM(S): 2 INJECTION INTRAMUSCULAR; INTRAVENOUS; SUBCUTANEOUS at 23:04

## 2022-07-02 RX ADMIN — MORPHINE SULFATE 4 MILLIGRAM(S): 50 CAPSULE, EXTENDED RELEASE ORAL at 18:24

## 2022-07-02 RX ADMIN — FENTANYL CITRATE 50 MICROGRAM(S): 50 INJECTION INTRAVENOUS at 08:15

## 2022-07-02 RX ADMIN — MORPHINE SULFATE 2 MILLIGRAM(S): 50 CAPSULE, EXTENDED RELEASE ORAL at 10:25

## 2022-07-02 RX ADMIN — INSULIN GLARGINE 17 UNIT(S): 100 INJECTION, SOLUTION SUBCUTANEOUS at 22:47

## 2022-07-02 RX ADMIN — MORPHINE SULFATE 4 MILLIGRAM(S): 50 CAPSULE, EXTENDED RELEASE ORAL at 19:56

## 2022-07-02 RX ADMIN — Medication 325 MILLIGRAM(S): at 22:47

## 2022-07-02 RX ADMIN — Medication 7 UNIT(S): at 18:26

## 2022-07-02 RX ADMIN — MIDODRINE HYDROCHLORIDE 10 MILLIGRAM(S): 2.5 TABLET ORAL at 17:46

## 2022-07-02 RX ADMIN — MORPHINE SULFATE 2 MILLIGRAM(S): 50 CAPSULE, EXTENDED RELEASE ORAL at 21:56

## 2022-07-02 RX ADMIN — MORPHINE SULFATE 2 MILLIGRAM(S): 50 CAPSULE, EXTENDED RELEASE ORAL at 20:53

## 2022-07-02 RX ADMIN — MORPHINE SULFATE 4 MILLIGRAM(S): 50 CAPSULE, EXTENDED RELEASE ORAL at 13:30

## 2022-07-02 RX ADMIN — Medication 1: at 18:25

## 2022-07-02 RX ADMIN — MORPHINE SULFATE 4 MILLIGRAM(S): 50 CAPSULE, EXTENDED RELEASE ORAL at 12:40

## 2022-07-02 RX ADMIN — MORPHINE SULFATE 2 MILLIGRAM(S): 50 CAPSULE, EXTENDED RELEASE ORAL at 11:21

## 2022-07-02 RX ADMIN — Medication 1 TABLET(S): at 17:46

## 2022-07-02 NOTE — CONSULT NOTE ADULT - ASSESSMENT
50 yo male with history of diabetes initially [place] [facility] with complaints of pain in his left hip s/p fall. Patient had a hip fracture of his left hip in October 2021 and was [place] [facility]. Patient was transferred to [facility] for evaluation.     Left hip fracture: CT of pelvis pending.   Ortho was consulted and he will  need OR soon  Patients previous hip fracture did not heal properly   pateitn has osteoporosis.   he will need DXA scan as outpatient once discharged   check mag, P, 25 OHD vit D , check testost , FSH/LH, TFts levels.   start calcium 600 mg bID   start vitamin d 1000 u qd  as outpatient consider anabolic agents, superior for bone mass gain not only prevention of fracture   start fosamax 70 mg po weekly.     DM: check A1c   Will place on Lantus 17 units BID  Will place on Lispro  7 units when eating  ISS    Hyponatremia:  start iv fluid NS   Likely pseudohyponatremia given Elevated glucose  Monitor     CKD3: monitor GFR  Pt declines interview and examination.

## 2022-07-02 NOTE — ED ADULT NURSE NOTE - NSIMPLEMENTINTERV_GEN_ALL_ED
Implemented All Fall Risk Interventions:  [place] to call system. Call bell, personal items and telephone within reach. Instruct patient to call for assistance. Room bathroom lighting operational. Non-slip footwear when patient is off stretcher. Physically safe environment: no spills, clutter or unnecessary equipment. Stretcher in lowest position, wheels locked, appropriate side rails in place. Provide visual cue, wrist band, yellow gown, etc. Monitor gait and stability. Monitor for mental status changes and reorient to person, place, and time. Review medications for side effects contributing to fall risk. Reinforce activity limits and safety measures with patient and family.

## 2022-07-02 NOTE — ED PROVIDER NOTE - PHYSICAL EXAMINATION
Const: Awake, alert and oriented. In no acute distress. Well appearing.  HEENT: NC/AT. Moist mucous membranes.  Eyes: No scleral icterus. EOMI.  Neck:. Soft and supple. Full ROM without pain.  Cardiac: Regular rate and regular rhythm. +S1/S2. No murmurs. Peripheral pulses 2+ and symmetric. 1+ bilateral LE edema.  Resp: Speaking in full sentences. No evidence of respiratory distress. No wheezes, rales or rhonchi.  Abd: Soft, non-tender, non-distended. Normal bowel sounds in all 4 quadrants. No guarding or rebound.  Back: Spine midline and non-tender. No CVAT.  Skin: No rashes, abrasions or lacerations.  MSK: Pelvis stable, left hip shortened and externally rotated. Sensation intact, full ROM left ankle/foot.  Neuro: Awake, alert & oriented x 3. Moves all extremities symmetrically.

## 2022-07-02 NOTE — ED ADULT NURSE NOTE - OBJECTIVE STATEMENT
Pt transferred from [facility] a&Ox3 states he was climbing out of a pool yesterday night and slipped and fell onto a already repaired hip, pt was found to have fx hip and transfer to [facility] for further care. pt currently resting in stretcher, updated on plan of care awaiting MD orders.

## 2022-07-02 NOTE — ED ADULT TRIAGE NOTE - CHIEF COMPLAINT QUOTE
patient transferred from [facility] [place] [facility] for a left hip fracture sustained while climbing out of pool last night at 9pm. last received dilaudid 0.5 mg at 0430 with no relief per patient.

## 2022-07-02 NOTE — H&P ADULT - ASSESSMENT
50 yo male with history of diabetes initially [place] [facility] [place] of pain in his left hip. Patient reports that he was trying to get out the pool and then he misplaced his foot and fell down on to his left hip. Patient complaints of pain at hip site. No other complaints. Of note, patient had a hip fracture of his left hip in October 2021 and was [place] [facility] Patient was transferred to [facility] for evaluation. Orthopedist requested admission to medicine for medical management.    #Left hip fracture  X-rays obtained   CT of pelvis ordered  Ortho was consulted   Will need OR soon    Revised Cardiac Risk Assessment   [  ]History of ischemic heart disease   [  ]History of congestive heart failure   [  ]History of cerebrovascular disease (stroke or transient ischemic attack)   [  ]History of diabetes requiring preoperative insulin use   [  ]Chronic kidney disease (creatinine > 2 mg/dL)   [  ]Undergoing suprainguinal vascular, intraperitoneal, or intrathoracic surgery     Patient does not have any known history of severe valvular disease and is not in decompensated CHF. No recent MI. Baseline functional capacity is > 4 METS. Patient is currently hemodynamically stable. Patient is medically optimized at this time and understands the inherent risks of surgery.     #DM  Will place on Lantus 17 units BID  Will place on Lispro  7 units when eating  ISS  A1C    #Hyponatremia  Likely pseudohyponatremia given Elevated glucose  Monitor     #CKD3  Monitor Renal function    DVT prophylaxis: Lovenox    52 yo male with history of diabetes initially presented to [facility] with complaints of pain in his left hip. Patient reports that he was trying to get out the pool and then he misplaced his foot and fell down on to his left hip. Patient complaints of pain at hip site. No other complaints. Of note, patient had a hip fracture of his left hip in October 2021 and was repaired at [facility]. Patient was transferred to [facility] for evaluation. Orthopedist requested admission to medicine for medical management.    #Left hip fracture  X-rays obtained   CT of pelvis ordered  Ortho was consulted   Will need OR soon    Revised Cardiac Risk Assessment   [  ]History of ischemic heart disease   [  ]History of congestive heart failure   [  ]History of cerebrovascular disease (stroke or transient ischemic attack)   [  ]History of diabetes requiring preoperative insulin use   [  ]Chronic kidney disease (creatinine > 2 mg/dL)   [  ]Undergoing suprainguinal vascular, intraperitoneal, or intrathoracic surgery     Patient does not have any known history of severe valvular disease and is not in decompensated CHF. No recent MI. Baseline functional capacity is > 4 METS. Patient is currently hemodynamically stable. Patient is medically optimized at this time and understands the inherent risks of surgery.   Pain control  As per Ortho - Endocrine consulted for bone health assessment  Patients previous hip fracture did not heal properly     #DM  Will place on Lantus 17 units BID  Will place on Lispro  7 units when eating  ISS  A1C    #Hyponatremia  Likely pseudohyponatremia given Elevated glucose  Monitor     #CKD3  Monitor Renal function    DVT prophylaxis: Lovenox    52 yo male with history of diabetes,  A. fib, depression, hypotension, HLD, hypothyroid [facility] complaints of pain in his left hip. Patient reports that he was trying to get out the pool and then he misplaced his foot and fell down on to his left hip. Patient complaints of pain at hip site. No other complaints. Of note, patient had a hip fracture of his left hip in October 2021 and was [facility]. Patient was transferred to [facility] for evaluation. Orthopedist requested admission to medicine for medical management.    #Left hip fracture  X-rays obtained   CT of pelvis ordered  Ortho was consulted   Will need OR soon    Revised Cardiac Risk Assessment   [  ]History of ischemic heart disease   [  ]History of congestive heart failure   [  ]History of cerebrovascular disease (stroke or transient ischemic attack)   [x ]History of diabetes requiring preoperative insulin use   [  ]Chronic kidney disease (creatinine > 2 mg/dL)   [  ]Undergoing suprainguinal vascular, intraperitoneal, or intrathoracic surgery     Patient does not have any known history of severe valvular disease and is not in decompensated CHF. No recent MI. Baseline functional capacity is > 4 METS. Patient is currently hemodynamically stable. Patient is medically optimized at this time and understands the inherent risks of surgery.   Pain control  As per Ortho - Endocrine consulted for bone health assessment  Patients previous hip fracture did not heal properly     #DM  Will place on Lantus 17 units BID  Will place on Lispro  7 units when eating  ISS  A1C    #Hyponatremia  Likely pseudohyponatremia given Elevated glucose  Monitor     #CKD3  Monitor Renal function    #A. fib  S/p ablation  C/w amiodarone  Not on AC    #Hypothyroid  C/w Synthroid    #HLD  Atorvastatin        DVT prophylaxis: Lovenox

## 2022-07-02 NOTE — ED PROVIDER NOTE - OBJECTIVE STATEMENT
52 y/o M with PMH DM presents as transfer from [place] after he fell getting out of the pool at 9 pm last night, injuring his already once surgically repaired left hip. Initial repair was done by an orthopedic in [place]. He notes an incredible amount of pain, denies any other injuries. Denies allergies to medications.

## 2022-07-02 NOTE — CONSULT NOTE ADULT - SUBJECTIVE AND OBJECTIVE BOX
HPI:  52 yo male with history of diabetes initially [place] [facility] [place] of pain in his left hip. Patient reports that he was trying to get out the pool and then he misplaced his foot and fell down on to his left hip. Of note, patient had a hip fracture of his left hip in October 2021 and was repaired at [facility]. Patient was transferred to [facility] for evaluation.  Orthopedist requested admission to medicine for medical management.     PAST MEDICAL & SURGICAL HISTORY:  DM (diabetes mellitus)  History of left hip replacement      FAMILY HISTORY:  FH: lung cancer (Mother)  FH: prostate cancer (Father)    SOCIAL HISTORY:    REVIEW OF SYSTEMS:  Constitutional: No fever, no chills, no change in weight.  Eyes: No eye swelling,no  blurry vision, no redness, no loss of vision.  Neck: No neck pain, no change in voice.  Lungs: No shortness of breath, no wheezing, no cough  CV: No chest pain, no palpitations  GI: No nausea, no vomiting, no constipation, no diarrhea, no abdominal pain  : No urinary frequency, no blood in urine  Musculoskeletal: No muscle pain, no joint pain, no swelling.  Skin: No rash, no infections.  Neurologic: No headaches, no weakness  Endocrine: No heat intolerance, no cold intolerance  Psych: No depression, no anxiety    MEDICATIONS  (STANDING):  dextrose 5%. 1000 milliLiter(s) (100 mL/Hr) IV Continuous <Continuous>  dextrose 5%. 1000 milliLiter(s) (50 mL/Hr) IV Continuous <Continuous>  dextrose 50% Injectable 25 Gram(s) IV Push once  dextrose 50% Injectable 12.5 Gram(s) IV Push once  dextrose 50% Injectable 25 Gram(s) IV Push once  enoxaparin Injectable 40 milliGRAM(s) SubCutaneous every 24 hours  glucagon  Injectable 1 milliGRAM(s) IntraMuscular once  insulin glargine Injectable (LANTUS) 17 Unit(s) SubCutaneous every morning  insulin glargine Injectable (LANTUS) 17 Unit(s) SubCutaneous at bedtime  insulin lispro (ADMELOG) corrective regimen sliding scale   SubCutaneous three times a day before meals  insulin lispro (ADMELOG) corrective regimen sliding scale   SubCutaneous at bedtime  insulin lispro Injectable (ADMELOG) 7 Unit(s) SubCutaneous three times a day before meals    MEDICATIONS  (PRN):  acetaminophen     Tablet .. 650 milliGRAM(s) Oral every 6 hours PRN Temp greater or equal to 38C (100.4F), Mild Pain (1 - 3)  aluminum hydroxide/magnesium hydroxide/simethicone Suspension 30 milliLiter(s) Oral every 4 hours PRN Dyspepsia  dextrose Oral Gel 15 Gram(s) Oral once PRN Blood Glucose LESS THAN 70 milliGRAM(s)/deciliter  melatonin 3 milliGRAM(s) Oral at bedtime PRN Insomnia  morphine  - Injectable 2 milliGRAM(s) IV Push every 4 hours PRN Moderate Pain (4 - 6)  morphine  - Injectable 4 milliGRAM(s) IV Push every 4 hours PRN Severe Pain (7 - 10)  ondansetron Injectable 4 milliGRAM(s) IV Push every 8 hours PRN Nausea and/or Vomiting    Allergies  Allergy Status Unknown    CAPILLARY BLOOD GLUCOSE  POCT Blood Glucose.: 243 mg/dL (02 Jul 2022 11:59)      PHYSICAL EXAM:    Vital Signs Last 24 Hrs  T(C): 36.7 (02 Jul 2022 11:24), Max: 36.8 (02 Jul 2022 07:13)  T(F): 98.1 (02 Jul 2022 11:24), Max: 98.2 (02 Jul 2022 07:13)  HR: 68 (02 Jul 2022 11:24) (68 - 76)  BP: 149/85 (02 Jul 2022 11:24) (136/84 - 149/85)  BP(mean): --  RR: 18 (02 Jul 2022 11:24) (18 - 18)  SpO2: 98% (02 Jul 2022 11:24) (98% - 99%)  GENERAL: NAD, Resting in bed  Eyes: EOMI, PERRLA  ENMT: Conjunctiva and sclera clear; supple neck, No JVD  Cardiovascular: S1,S2, RRR, No murmur  Respiratory: CTA B/L, Non-labored breathing  GI: Bowel sounds present; Soft, Nontender, Nondistended. No hepatomegaly  Genitourinary: Deferred  Skin:  no breakdowns, ulcers or discharge, No rashes or lesions  Neurology: Alert & Oriented X3, non-focal and spontaneous movements of all extremities, tenderness at left hip, able to move all toes, pulses +  Psych: Appropriate mood and affect, calm, pleasant, Responds appropriately to questions      LABS:                        11.7   8.41  )-----------( 200      ( 02 Jul 2022 08:42 )             35.8     07-02    130<L>  |  95<L>  |  26.4<H>  ----------------------------<  256<H>  5.1   |  23.0  |  1.56<H>    Ca    9.2      02 Jul 2022 08:42    TPro  7.2  /  Alb  3.5  /  TBili  0.6  /  DBili  x   /  AST  40<H>  /  ALT  30  /  AlkPhos  373<H>  07-02      LIVER FUNCTIONS - ( 02 Jul 2022 08:42 )  Alb: 3.5 g/dL / Pro: 7.2 g/dL / ALK PHOS: 373 U/L / ALT: 30 U/L / AST: 40 U/L / GGT: x           CAPILLARY BLOOD GLUCOSE  POCT Blood Glucose.: 243 mg/dL (02 Jul 2022 11:59)     HPI:  50 yo male with history of diabetes initially presented to [facility] with complaints of pain in his left hip. Patient reports that he was trying to get out the pool and then he misplaced his foot and fell down on to his left hip. Of note, patient had a hip fracture of his left hip in October 2021 and was repaired at [facility]. Patient was transferred to [facility] for evaluation.  Orthopedist requested admission to medicine for medical management. History obtain from chart since patient declines being evaluated or interviewed bc he does not want to get a bill if i am not in plan with his insurance.     PAST MEDICAL & SURGICAL HISTORY:  DM (diabetes mellitus)  History of left hip replacement      FAMILY HISTORY:    SOCIAL HISTORY:    REVIEW OF SYSTEMS:    MEDICATIONS  (STANDING):  dextrose 5%. 1000 milliLiter(s) (100 mL/Hr) IV Continuous <Continuous>  dextrose 5%. 1000 milliLiter(s) (50 mL/Hr) IV Continuous <Continuous>  dextrose 50% Injectable 25 Gram(s) IV Push once  dextrose 50% Injectable 12.5 Gram(s) IV Push once  dextrose 50% Injectable 25 Gram(s) IV Push once  enoxaparin Injectable 40 milliGRAM(s) SubCutaneous every 24 hours  glucagon  Injectable 1 milliGRAM(s) IntraMuscular once  insulin glargine Injectable (LANTUS) 17 Unit(s) SubCutaneous every morning  insulin glargine Injectable (LANTUS) 17 Unit(s) SubCutaneous at bedtime  insulin lispro (ADMELOG) corrective regimen sliding scale   SubCutaneous three times a day before meals  insulin lispro (ADMELOG) corrective regimen sliding scale   SubCutaneous at bedtime  insulin lispro Injectable (ADMELOG) 7 Unit(s) SubCutaneous three times a day before meals    MEDICATIONS  (PRN):  acetaminophen     Tablet .. 650 milliGRAM(s) Oral every 6 hours PRN Temp greater or equal to 38C (100.4F), Mild Pain (1 - 3)  aluminum hydroxide/magnesium hydroxide/simethicone Suspension 30 milliLiter(s) Oral every 4 hours PRN Dyspepsia  dextrose Oral Gel 15 Gram(s) Oral once PRN Blood Glucose LESS THAN 70 milliGRAM(s)/deciliter  melatonin 3 milliGRAM(s) Oral at bedtime PRN Insomnia  morphine  - Injectable 2 milliGRAM(s) IV Push every 4 hours PRN Moderate Pain (4 - 6)  morphine  - Injectable 4 milliGRAM(s) IV Push every 4 hours PRN Severe Pain (7 - 10)  ondansetron Injectable 4 milliGRAM(s) IV Push every 8 hours PRN Nausea and/or Vomiting    Allergies  Allergy Status Unknown    CAPILLARY BLOOD GLUCOSE  POCT Blood Glucose.: 243 mg/dL (02 Jul 2022 11:59)      PHYSICAL EXAM:    Vital Signs Last 24 Hrs  T(C): 36.7 (02 Jul 2022 11:24), Max: 36.8 (02 Jul 2022 07:13)  T(F): 98.1 (02 Jul 2022 11:24), Max: 98.2 (02 Jul 2022 07:13)  HR: 68 (02 Jul 2022 11:24) (68 - 76)  BP: 149/85 (02 Jul 2022 11:24) (136/84 - 149/85)  BP(mean): --  RR: 18 (02 Jul 2022 11:24) (18 - 18)  SpO2: 98% (02 Jul 2022 11:24) (98% - 99%)        LABS:                        11.7   8.41  )-----------( 200      ( 02 Jul 2022 08:42 )             35.8     07-02    130<L>  |  95<L>  |  26.4<H>  ----------------------------<  256<H>  5.1   |  23.0  |  1.56<H>    Ca    9.2      02 Jul 2022 08:42    TPro  7.2  /  Alb  3.5  /  TBili  0.6  /  DBili  x   /  AST  40<H>  /  ALT  30  /  AlkPhos  373<H>  07-02      LIVER FUNCTIONS - ( 02 Jul 2022 08:42 )  Alb: 3.5 g/dL / Pro: 7.2 g/dL / ALK PHOS: 373 U/L / ALT: 30 U/L / AST: 40 U/L / GGT: x           CAPILLARY BLOOD GLUCOSE  POCT Blood Glucose.: 243 mg/dL (02 Jul 2022 11:59)

## 2022-07-02 NOTE — ED PROVIDER NOTE - CLINICAL SUMMARY MEDICAL DECISION MAKING FREE TEXT BOX
50 y/o M presents for left hip fracture [place] [facility] Neuro-vascularly intact, will pre-op, ortho consult, plan for admission.

## 2022-07-02 NOTE — ED ADULT NURSE NOTE - CHIEF COMPLAINT QUOTE
patient transferred [facility] for a left hip fracture sustained while climbing out of pool last night at 9pm. last received dilaudid 0.5 mg at 0430 with no relief per patient.

## 2022-07-02 NOTE — CONSULT NOTE ADULT - SUBJECTIVE AND OBJECTIVE BOX
Pt Name: ELZBIETA CÁRDENAS  MRN: [id number]      Patient is a 51y Male PMHx A. Fib, DM, kidney failure, anemia  transfer from [place] for left hip fracture. Patient seen and examined. Patient states that last night he was trying to get out of the pool when he fell and landed on his left hip - patient states that he heard a break. Patient was unable to get up after the fall. Patient states that prior to the fall that he was able to ambulate on his own without any issues. Patient has a history of a left hip fracture in October 2021 that was fixed with a IMN and done at [place] - patient unaware of surgeons name. Patient states that he followed up outpatient with the surgeon a couple of weeks after but never saw him after that. Patient denies numbness, tingling to extremities CP, SOB, acute sensory changes. No other orthopedic complaint.       REVIEW OF SYSTEMS  General: Alert, responsive, in NAD  Skin/Breast: No rashes, no pruritis   Respiratory and Thorax: No difficulty breathing. No cough.  Cardiovascular:	No chest pain. No palpitations.  Gastrointestinal:	 No abdominal pain. No diarrhea.   Musculoskeletal: SEE HPI.  Neurological: No sensory changes.   Hematology/Lymphatics: No swelling.  ROS is otherwise negative.      PAST MEDICAL & SURGICAL HISTORY:  DM (diabetes mellitus)      History of left hip replacement          Allergies: Allergy Status Unknown      Medications: acetaminophen     Tablet .. 650 milliGRAM(s) Oral every 6 hours PRN  aluminum hydroxide/magnesium hydroxide/simethicone Suspension 30 milliLiter(s) Oral every 4 hours PRN  dextrose 5%. 1000 milliLiter(s) IV Continuous <Continuous>  dextrose 5%. 1000 milliLiter(s) IV Continuous <Continuous>  dextrose 50% Injectable 25 Gram(s) IV Push once  dextrose 50% Injectable 12.5 Gram(s) IV Push once  dextrose 50% Injectable 25 Gram(s) IV Push once  dextrose Oral Gel 15 Gram(s) Oral once PRN  enoxaparin Injectable 40 milliGRAM(s) SubCutaneous every 24 hours  glucagon  Injectable 1 milliGRAM(s) IntraMuscular once  insulin glargine Injectable (LANTUS) 17 Unit(s) SubCutaneous every morning  insulin glargine Injectable (LANTUS) 17 Unit(s) SubCutaneous at bedtime  insulin lispro (ADMELOG) corrective regimen sliding scale   SubCutaneous three times a day before meals  insulin lispro (ADMELOG) corrective regimen sliding scale   SubCutaneous at bedtime  insulin lispro Injectable (ADMELOG) 7 Unit(s) SubCutaneous three times a day before meals  melatonin 3 milliGRAM(s) Oral at bedtime PRN  morphine  - Injectable 2 milliGRAM(s) IV Push every 4 hours PRN  morphine  - Injectable 4 milliGRAM(s) IV Push every 4 hours PRN  ondansetron Injectable 4 milliGRAM(s) IV Push every 8 hours PRN      FAMILY HISTORY:  FH: lung cancer (Mother)    FH: prostate cancer (Father)    : non-contributory    Social History:                             11.7   8.41  )-----------( 200      ( 02 Jul 2022 08:42 )             35.8       07-02    130<L>  |  95<L>  |  26.4<H>  ----------------------------<  256<H>  5.1   |  23.0  |  1.56<H>    Ca    9.2      02 Jul 2022 08:42    TPro  7.2  /  Alb  3.5  /  TBili  0.6  /  DBili  x   /  AST  40<H>  /  ALT  30  /  AlkPhos  373<H>  07-02      Vital Signs Last 24 Hrs  T(C): 36.7 (02 Jul 2022 11:24), Max: 36.8 (02 Jul 2022 07:13)  T(F): 98.1 (02 Jul 2022 11:24), Max: 98.2 (02 Jul 2022 07:13)  HR: 68 (02 Jul 2022 11:24) (68 - 76)  BP: 149/85 (02 Jul 2022 11:24) (136/84 - 149/85)  BP(mean): --  RR: 18 (02 Jul 2022 11:24) (18 - 18)  SpO2: 98% (02 Jul 2022 11:24) (98% - 99%)    Daily Height in cm: 190.5 (02 Jul 2022 06:11)    Daily       PHYSICAL EXAM:    Appearance: Alert, responsive, in no acute distress.    Musculoskeletal:         Left Upper Extremity: No obvious deformity. NROM. Atraumatic. Nontender. Skin is clean, dry, and intact. No abrasions, ecchymosis, or erythema.        Right Upper Extremity: No obvious deformity. NROM. Atraumatic. Nontender. Skin is clean, dry, and intact. No abrasions, ecchymosis, or erythema.        Left Lower Extremity: + TTP to left hip. No TTP to rest of extremity. No open wounds noted. No bleeding. +DF/PF/EHL/FHL. 2+ DP pulse. Sensation grossly intact to light touch distally.        Right Lower Extremity: No obvious deformity. NROM. Atraumatic. Nontender. Skin is clean, dry, and intact. No abrasions, ecchymosis, or erythema.     Imaging Studies:  < from: CT Pelvis Bony Only No Cont (07.02.22 @ 09:03) >  ACC: [id number] EXAM:  CT PELVIS BONY ONLY                          PROCEDURE DATE:  07/02/2022          INTERPRETATION:  CT PELVIS BONY    HISTORY: Pain, fracture.    TECHNIQUE: Contiguous axial imaging was performed through the pelvis   without contrast.  Coronal and sagittal reformatting was utilized.    COMPARISON: Pelvis, left hip, and left femur x-rays dated 7/2/2022 and   preoperative and intraoperative x-rays dated 10/19/2021    FINDINGS:    OSSEOUS STRUCTURES    Acute/Chronic Fractures:Acute oblique fracture is again seen   involving the proximal 3rd of the femoral diaphysis around the gamma nail   and distal interlocking screw with fragmentation of the interlocking   screw and mild displacement. Chronic ununited appearing basicervical to   intertrochanteric fracture is again seen with the gamma nail for   fixation. There is moderate surrounding heterotopic ossification. Up to   0.5 cm of lucency is seen around the femoral neck screw at the level of   the greater trochanter. There is increased dynamic motion of the screw   that is now seen to extend approximately 1.7 cm lateral to the lateral   cortex. Moderate amount of fluid is present within the overlying greater   trochanteric bursa.    PELVIC JOINTS    Symphysis Pubis: Preserved.    Sacroiliac Joints:  Maintained.    RIGHT HIP JOINT    Avascular Necrosis:  None.    Joint Space:  Maintained.    Effusion/Synovitis:  None.    LEFT HIP JOINT    Avascular Necrosis:  None.    Joint Space:  Maintained.    Effusion/Synovitis:  There is moderate hemarthrosis/synovial   thickening/capsular scarring.    VISUALIZED SPINE  Transitional lumbosacral level is noted.    SOFT TISSUES    Neurovascular:  Moderate to severe atherosclerotic calcifications are   present.    Pelvis/Abdomen:  Unremarkable.    Musculature:  Moderate to severe left iliopsoas muscle atrophy is   present and there is moderate atrophy of the left gluteus minimus and   medius muscles. Heterotopic ossification is present involving the left   iliopsoas distal myotendinous junction. Mild hematoma formation is   present around the left hip fracture.    Subcutaneous Tissues:  There is lateral incisional scarring of the left   hip.    IMPRESSION:  1. Acute mildly displaced fracture is again seen involvingthe proximal   3rd of the left femoral diaphysis surrounding the patient's gamma nail   with fragmentation of the distal interlocking screw.  2. Chronic appearing ununited fracture of the left basicervical to   intertrochanteric region with the gammanail for fixation and moderate   surrounding heterotopic ossification. Correlation with other priors is   suggested for confirmation.  3. Moderate hemarthrosis/synovial thickening/capsular scarring of the   left hip.  4. Left hip muscle atrophy.    --- End of Report ---            KIMBERLY DONOVAN MD; Attending Radiologist  This document has been electronically signed. Jul 2 2022  9:52AM    < end of copied text >          A/P:  Pt is a  51y Male with Left hip nonunion fracture, periprosthetic fracture    PLAN:   * Plan and images discussed with Dr. Kay  * OR pending medical optimization   * F/u ESR, CRP   * Rec Endo consult for bone health eval   * Bedrest   * Lovenox 40qd   * Continue care as per primary team Pt Name: ELZBIETA CÁRDENAS  MRN: [id number]      Patient is a 51y Male PMHx A. Fib, DM, kidney failure, anemia  transfer from [place] for left hip fracture. Patient seen and examined. Patient states that last night he was trying to get out of the pool when he fell and landed on his left hip - patient states that he heard a break. Patient was unable to get up after the fall. Patient states that prior to the fall that he was able to ambulate on his own without any issues. Patient has a history of a left hip fracture in October 2021 that was fixed with a IMN and done at [place] - patient unaware of surgeons name. Patient states that he followed up outpatient with the surgeon a couple of weeks after but never saw him after that. Patient denies numbness, tingling to extremities CP, SOB, acute sensory changes. No other orthopedic complaint.       REVIEW OF SYSTEMS  General: Alert, responsive, in NAD  Skin/Breast: No rashes, no pruritis   Respiratory and Thorax: No difficulty breathing. No cough.  Cardiovascular:	No chest pain. No palpitations.  Gastrointestinal:	 No abdominal pain. No diarrhea.   Musculoskeletal: SEE HPI.  Neurological: No sensory changes.   Hematology/Lymphatics: No swelling.  ROS is otherwise negative.      PAST MEDICAL & SURGICAL HISTORY:  DM (diabetes mellitus)      History of left hip replacement          Allergies: Allergy Status Unknown      Medications: acetaminophen     Tablet .. 650 milliGRAM(s) Oral every 6 hours PRN  aluminum hydroxide/magnesium hydroxide/simethicone Suspension 30 milliLiter(s) Oral every 4 hours PRN  dextrose 5%. 1000 milliLiter(s) IV Continuous <Continuous>  dextrose 5%. 1000 milliLiter(s) IV Continuous <Continuous>  dextrose 50% Injectable 25 Gram(s) IV Push once  dextrose 50% Injectable 12.5 Gram(s) IV Push once  dextrose 50% Injectable 25 Gram(s) IV Push once  dextrose Oral Gel 15 Gram(s) Oral once PRN  enoxaparin Injectable 40 milliGRAM(s) SubCutaneous every 24 hours  glucagon  Injectable 1 milliGRAM(s) IntraMuscular once  insulin glargine Injectable (LANTUS) 17 Unit(s) SubCutaneous every morning  insulin glargine Injectable (LANTUS) 17 Unit(s) SubCutaneous at bedtime  insulin lispro (ADMELOG) corrective regimen sliding scale   SubCutaneous three times a day before meals  insulin lispro (ADMELOG) corrective regimen sliding scale   SubCutaneous at bedtime  insulin lispro Injectable (ADMELOG) 7 Unit(s) SubCutaneous three times a day before meals  melatonin 3 milliGRAM(s) Oral at bedtime PRN  morphine  - Injectable 2 milliGRAM(s) IV Push every 4 hours PRN  morphine  - Injectable 4 milliGRAM(s) IV Push every 4 hours PRN  ondansetron Injectable 4 milliGRAM(s) IV Push every 8 hours PRN      FAMILY HISTORY:  FH: lung cancer (Mother)    FH: prostate cancer (Father)    : non-contributory    Social History:                             11.7   8.41  )-----------( 200      ( 02 Jul 2022 08:42 )             35.8       07-02    130<L>  |  95<L>  |  26.4<H>  ----------------------------<  256<H>  5.1   |  23.0  |  1.56<H>    Ca    9.2      02 Jul 2022 08:42    TPro  7.2  /  Alb  3.5  /  TBili  0.6  /  DBili  x   /  AST  40<H>  /  ALT  30  /  AlkPhos  373<H>  07-02      Vital Signs Last 24 Hrs  T(C): 36.7 (02 Jul 2022 11:24), Max: 36.8 (02 Jul 2022 07:13)  T(F): 98.1 (02 Jul 2022 11:24), Max: 98.2 (02 Jul 2022 07:13)  HR: 68 (02 Jul 2022 11:24) (68 - 76)  BP: 149/85 (02 Jul 2022 11:24) (136/84 - 149/85)  BP(mean): --  RR: 18 (02 Jul 2022 11:24) (18 - 18)  SpO2: 98% (02 Jul 2022 11:24) (98% - 99%)    Daily Height in cm: 190.5 (02 Jul 2022 06:11)    Daily       PHYSICAL EXAM:    Appearance: Alert, responsive, in no acute distress.    Musculoskeletal:         Left Upper Extremity: No obvious deformity. NROM. Atraumatic. Nontender. Skin is clean, dry, and intact. No abrasions, ecchymosis, or erythema.        Right Upper Extremity: No obvious deformity. NROM. Atraumatic. Nontender. Skin is clean, dry, and intact. No abrasions, ecchymosis, or erythema.        Left Lower Extremity: + TTP to left hip. No TTP to rest of extremity. No open wounds noted. No bleeding. +DF/PF/EHL/FHL. 2+ DP pulse. Sensation grossly intact to light touch distally.        Right Lower Extremity: No obvious deformity. NROM. Atraumatic. Nontender. Skin is clean, dry, and intact. No abrasions, ecchymosis, or erythema.     Imaging Studies:  < from: CT Pelvis Bony Only No Cont (07.02.22 @ 09:03) >  ACC: [id number] EXAM:  CT PELVIS BONY ONLY                          PROCEDURE DATE:  07/02/2022          INTERPRETATION:  CT PELVIS BONY    HISTORY: Pain, fracture.    TECHNIQUE: Contiguous axial imaging was performed through the pelvis   without contrast.  Coronal and sagittal reformatting was utilized.    COMPARISON: Pelvis, left hip, and left femur x-rays dated 7/2/2022 and   preoperative and intraoperative x-rays dated 10/19/2021    FINDINGS:    OSSEOUS STRUCTURES    Acute/Chronic Fractures:Acute oblique fracture is again seen   involving the proximal 3rd of the femoral diaphysis around the gamma nail   and distal interlocking screw with fragmentation of the interlocking   screw and mild displacement. Chronic ununited appearing basicervical to   intertrochanteric fracture is again seen with the gamma nail for   fixation. There is moderate surrounding heterotopic ossification. Up to   0.5 cm of lucency is seen around the femoral neck screw at the level of   the greater trochanter. There is increased dynamic motion of the screw   that is now seen to extend approximately 1.7 cm lateral to the lateral   cortex. Moderate amount of fluid is present within the overlying greater   trochanteric bursa.    PELVIC JOINTS    Symphysis Pubis: Preserved.    Sacroiliac Joints:  Maintained.    RIGHT HIP JOINT    Avascular Necrosis:  None.    Joint Space:  Maintained.    Effusion/Synovitis:  None.    LEFT HIP JOINT    Avascular Necrosis:  None.    Joint Space:  Maintained.    Effusion/Synovitis:  There is moderate hemarthrosis/synovial   thickening/capsular scarring.    VISUALIZED SPINE  Transitional lumbosacral level is noted.    SOFT TISSUES    Neurovascular:  Moderate to severe atherosclerotic calcifications are   present.    Pelvis/Abdomen:  Unremarkable.    Musculature:  Moderate to severe left iliopsoas muscle atrophy is   present and there is moderate atrophy of the left gluteus minimus and   medius muscles. Heterotopic ossification is present involving the left   iliopsoas distal myotendinous junction. Mild hematoma formation is   present around the left hip fracture.    Subcutaneous Tissues:  There is lateral incisional scarring of the left   hip.    IMPRESSION:  1. Acute mildly displaced fracture is again seen involvingthe proximal   3rd of the left femoral diaphysis surrounding the patient's gamma nail   with fragmentation of the distal interlocking screw.  2. Chronic appearing ununited fracture of the left basicervical to   intertrochanteric region with the gammanail for fixation and moderate   surrounding heterotopic ossification. Correlation with other priors is   suggested for confirmation.  3. Moderate hemarthrosis/synovial thickening/capsular scarring of the   left hip.  4. Left hip muscle atrophy.    --- End of Report ---            KIMBERLY DONOVAN MD; Attending Radiologist  This document has been electronically signed. Jul 2 2022  9:52AM    < end of copied text >          A/P:  Pt is a  51y Male with Left hip nonunion fracture, periprosthetic fracture    PLAN:   * Plan and images discussed with Dr. Kay  * OR pending medical optimization & OR availability  * F/u ESR, CRP   * Rec Endo consult for bone health eval   * Bedrest   * Lovenox 40qd   * Continue care as per primary team

## 2022-07-02 NOTE — H&P ADULT - HISTORY OF PRESENT ILLNESS
50 yo male with history of diabetes initially presented to [facility] with complaints of pain in his left hip. Patient reports that he was trying to get out the pool and then he misplaced his foot and fell down on to his left hip. Patient complaints of pain at hip site. No other complaints. Of note, patient had a hip fracture of his left hip in October 2021 and was repaired at [facility]. Patient was transferred to [facility] for evaluation.  Orthopedist requested admission to medicine for medical management. 52 yo male with history of diabetes, A. fib, depression, hypotension, HLD, hypothyroid [place] [facility] [place] of [place] in his [place] hip. Patient reports that he was trying to get out the pool and then he misplaced his foot and fell down on to his left hip. Patient complaints of pain at hip site. No other complaints. Of note, patient had a hip fracture of his left hip in October 2021 and [place] [facility] Patient was transferred to [facility] for evaluation.  Orthopedist requested admission to medicine for medical management.

## 2022-07-02 NOTE — H&P ADULT - NSICDXFAMILYHX_GEN_ALL_CORE_FT
FAMILY HISTORY:  Father  Still living? Unknown  FH: prostate cancer, Age at diagnosis: Age Unknown    Mother  Still living? Unknown  FH: lung cancer, Age at diagnosis: Age Unknown

## 2022-07-02 NOTE — H&P ADULT - NSHPPHYSICALEXAM_GEN_ALL_CORE
PHYSICAL EXAM:  Vital Signs Last 24 Hrs  T(F): 98.2 (02 Jul 2022 07:13), Max: 98.2 (02 Jul 2022 07:13)  HR: 73 (02 Jul 2022 07:13) (73 - 76)  BP: 136/84 (02 Jul 2022 07:13) (136/84 - 143/84)  RR: 18 (02 Jul 2022 07:13) (18 - 18)  SpO2: 98% (02 Jul 2022 07:13) (98% - 99%)    GENERAL: NAD, Resting in bed  Eyes: EOMI, PERRLA  ENMT: Conjunctiva and sclera clear; supple neck, No JVD  Cardiovascular: S1,S2, RRR, No murmur  Respiratory: CTA B/L, Non-labored breathing  GI: Bowel sounds present; Soft, Nontender, Nondistended. No hepatomegaly  Genitourinary: Deferred  Skin:  no breakdowns, ulcers or discharge, No rashes or lesions  Neurology: Alert & Oriented X3, non-focal and spontaneous movements of all extremities, CN 2 to 12 grossly intact , tenderness at left hip, able to move all toes, pulses +  Psych: Appropriate mood and affect, calm, pleasant, Responds appropriately to questions

## 2022-07-02 NOTE — PATIENT PROFILE ADULT - FALL HARM RISK - RISK INTERVENTIONS

## 2022-07-03 LAB
A1C WITH ESTIMATED AVERAGE GLUCOSE RESULT: 9 % — HIGH (ref 4–5.6)
ALBUMIN SERPL ELPH-MCNC: 3.6 G/DL — SIGNIFICANT CHANGE UP (ref 3.3–5.2)
ALP SERPL-CCNC: 314 U/L — HIGH (ref 40–120)
ALT FLD-CCNC: 21 U/L — SIGNIFICANT CHANGE UP
ANION GAP SERPL CALC-SCNC: 15 MMOL/L — SIGNIFICANT CHANGE UP (ref 5–17)
AST SERPL-CCNC: 19 U/L — SIGNIFICANT CHANGE UP
BASOPHILS # BLD AUTO: 0.04 K/UL — SIGNIFICANT CHANGE UP (ref 0–0.2)
BASOPHILS NFR BLD AUTO: 0.5 % — SIGNIFICANT CHANGE UP (ref 0–2)
BILIRUB SERPL-MCNC: 0.9 MG/DL — SIGNIFICANT CHANGE UP (ref 0.4–2)
BUN SERPL-MCNC: 30.1 MG/DL — HIGH (ref 8–20)
CALCIUM SERPL-MCNC: 9.1 MG/DL — SIGNIFICANT CHANGE UP (ref 8.6–10.2)
CHLORIDE SERPL-SCNC: 95 MMOL/L — LOW (ref 98–107)
CHOLEST SERPL-MCNC: 133 MG/DL — SIGNIFICANT CHANGE UP
CO2 SERPL-SCNC: 22 MMOL/L — SIGNIFICANT CHANGE UP (ref 22–29)
CREAT SERPL-MCNC: 1.82 MG/DL — HIGH (ref 0.5–1.3)
EGFR: 44 ML/MIN/1.73M2 — LOW
EOSINOPHIL # BLD AUTO: 0.09 K/UL — SIGNIFICANT CHANGE UP (ref 0–0.5)
EOSINOPHIL NFR BLD AUTO: 1.2 % — SIGNIFICANT CHANGE UP (ref 0–6)
ESTIMATED AVERAGE GLUCOSE: 212 MG/DL — HIGH (ref 68–114)
GLUCOSE BLDC GLUCOMTR-MCNC: 116 MG/DL — HIGH (ref 70–99)
GLUCOSE BLDC GLUCOMTR-MCNC: 172 MG/DL — HIGH (ref 70–99)
GLUCOSE BLDC GLUCOMTR-MCNC: 199 MG/DL — HIGH (ref 70–99)
GLUCOSE BLDC GLUCOMTR-MCNC: 218 MG/DL — HIGH (ref 70–99)
GLUCOSE BLDC GLUCOMTR-MCNC: 281 MG/DL — HIGH (ref 70–99)
GLUCOSE BLDC GLUCOMTR-MCNC: 51 MG/DL — CRITICAL LOW (ref 70–99)
GLUCOSE BLDC GLUCOMTR-MCNC: 61 MG/DL — LOW (ref 70–99)
GLUCOSE SERPL-MCNC: 277 MG/DL — HIGH (ref 70–99)
HCT VFR BLD CALC: 35.3 % — LOW (ref 39–50)
HDLC SERPL-MCNC: 38 MG/DL — LOW
HGB BLD-MCNC: 11.5 G/DL — LOW (ref 13–17)
IMM GRANULOCYTES NFR BLD AUTO: 0.5 % — SIGNIFICANT CHANGE UP (ref 0–1.5)
LIPID PNL WITH DIRECT LDL SERPL: 74 MG/DL — SIGNIFICANT CHANGE UP
LYMPHOCYTES # BLD AUTO: 1.2 K/UL — SIGNIFICANT CHANGE UP (ref 1–3.3)
LYMPHOCYTES # BLD AUTO: 16.3 % — SIGNIFICANT CHANGE UP (ref 13–44)
MCHC RBC-ENTMCNC: 30.9 PG — SIGNIFICANT CHANGE UP (ref 27–34)
MCHC RBC-ENTMCNC: 32.6 GM/DL — SIGNIFICANT CHANGE UP (ref 32–36)
MCV RBC AUTO: 94.9 FL — SIGNIFICANT CHANGE UP (ref 80–100)
MONOCYTES # BLD AUTO: 0.95 K/UL — HIGH (ref 0–0.9)
MONOCYTES NFR BLD AUTO: 12.9 % — SIGNIFICANT CHANGE UP (ref 2–14)
NEUTROPHILS # BLD AUTO: 5.02 K/UL — SIGNIFICANT CHANGE UP (ref 1.8–7.4)
NEUTROPHILS NFR BLD AUTO: 68.6 % — SIGNIFICANT CHANGE UP (ref 43–77)
NON HDL CHOLESTEROL: 95 MG/DL — SIGNIFICANT CHANGE UP
PLATELET # BLD AUTO: 221 K/UL — SIGNIFICANT CHANGE UP (ref 150–400)
POTASSIUM SERPL-MCNC: 4.3 MMOL/L — SIGNIFICANT CHANGE UP (ref 3.5–5.3)
POTASSIUM SERPL-SCNC: 4.3 MMOL/L — SIGNIFICANT CHANGE UP (ref 3.5–5.3)
PROT SERPL-MCNC: 7 G/DL — SIGNIFICANT CHANGE UP (ref 6.6–8.7)
RBC # BLD: 3.72 M/UL — LOW (ref 4.2–5.8)
RBC # FLD: 13.1 % — SIGNIFICANT CHANGE UP (ref 10.3–14.5)
SODIUM SERPL-SCNC: 132 MMOL/L — LOW (ref 135–145)
TRIGL SERPL-MCNC: 104 MG/DL — SIGNIFICANT CHANGE UP
VIT D25+D1,25 OH+D1,25 PNL SERPL-MCNC: 34.7 PG/ML — SIGNIFICANT CHANGE UP (ref 19.9–79.3)
WBC # BLD: 7.34 K/UL — SIGNIFICANT CHANGE UP (ref 3.8–10.5)
WBC # FLD AUTO: 7.34 K/UL — SIGNIFICANT CHANGE UP (ref 3.8–10.5)

## 2022-07-03 PROCEDURE — 99233 SBSQ HOSP IP/OBS HIGH 50: CPT

## 2022-07-03 PROCEDURE — 99232 SBSQ HOSP IP/OBS MODERATE 35: CPT

## 2022-07-03 RX ADMIN — MIDODRINE HYDROCHLORIDE 10 MILLIGRAM(S): 2.5 TABLET ORAL at 16:10

## 2022-07-03 RX ADMIN — Medication 1000 UNIT(S): at 11:06

## 2022-07-03 RX ADMIN — Medication 5000 UNIT(S): at 11:06

## 2022-07-03 RX ADMIN — INSULIN GLARGINE 17 UNIT(S): 100 INJECTION, SOLUTION SUBCUTANEOUS at 08:20

## 2022-07-03 RX ADMIN — Medication 325 MILLIGRAM(S): at 11:06

## 2022-07-03 RX ADMIN — MORPHINE SULFATE 4 MILLIGRAM(S): 50 CAPSULE, EXTENDED RELEASE ORAL at 11:49

## 2022-07-03 RX ADMIN — MIDODRINE HYDROCHLORIDE 10 MILLIGRAM(S): 2.5 TABLET ORAL at 05:12

## 2022-07-03 RX ADMIN — Medication 50 MICROGRAM(S): at 05:12

## 2022-07-03 RX ADMIN — Medication 7 UNIT(S): at 07:50

## 2022-07-03 RX ADMIN — AMIODARONE HYDROCHLORIDE 200 MILLIGRAM(S): 400 TABLET ORAL at 05:12

## 2022-07-03 RX ADMIN — ENOXAPARIN SODIUM 40 MILLIGRAM(S): 100 INJECTION SUBCUTANEOUS at 08:19

## 2022-07-03 RX ADMIN — MORPHINE SULFATE 4 MILLIGRAM(S): 50 CAPSULE, EXTENDED RELEASE ORAL at 20:01

## 2022-07-03 RX ADMIN — Medication 1: at 11:05

## 2022-07-03 RX ADMIN — Medication 1: at 16:10

## 2022-07-03 RX ADMIN — Medication 1 MILLIGRAM(S): at 11:06

## 2022-07-03 RX ADMIN — Medication 7 UNIT(S): at 11:05

## 2022-07-03 RX ADMIN — MORPHINE SULFATE 4 MILLIGRAM(S): 50 CAPSULE, EXTENDED RELEASE ORAL at 06:34

## 2022-07-03 RX ADMIN — MIDODRINE HYDROCHLORIDE 10 MILLIGRAM(S): 2.5 TABLET ORAL at 11:05

## 2022-07-03 RX ADMIN — Medication 325 MILLIGRAM(S): at 21:10

## 2022-07-03 RX ADMIN — MORPHINE SULFATE 4 MILLIGRAM(S): 50 CAPSULE, EXTENDED RELEASE ORAL at 01:49

## 2022-07-03 RX ADMIN — MORPHINE SULFATE 4 MILLIGRAM(S): 50 CAPSULE, EXTENDED RELEASE ORAL at 01:45

## 2022-07-03 RX ADMIN — PREGABALIN 1000 MICROGRAM(S): 225 CAPSULE ORAL at 11:06

## 2022-07-03 RX ADMIN — INSULIN GLARGINE 17 UNIT(S): 100 INJECTION, SOLUTION SUBCUTANEOUS at 21:10

## 2022-07-03 RX ADMIN — Medication 3: at 07:49

## 2022-07-03 RX ADMIN — Medication 1 TABLET(S): at 16:10

## 2022-07-03 RX ADMIN — MORPHINE SULFATE 4 MILLIGRAM(S): 50 CAPSULE, EXTENDED RELEASE ORAL at 11:19

## 2022-07-03 RX ADMIN — MORPHINE SULFATE 4 MILLIGRAM(S): 50 CAPSULE, EXTENDED RELEASE ORAL at 06:07

## 2022-07-03 RX ADMIN — Medication 1 TABLET(S): at 05:11

## 2022-07-03 RX ADMIN — MORPHINE SULFATE 4 MILLIGRAM(S): 50 CAPSULE, EXTENDED RELEASE ORAL at 20:20

## 2022-07-03 RX ADMIN — Medication 325 MILLIGRAM(S): at 05:11

## 2022-07-03 NOTE — PROGRESS NOTE ADULT - ASSESSMENT
52 yo male with history of diabetes,  A. fib, depression, hypotension, HLD, hypothyroid [place] [facility] [place] of [place] in his left hip. Patient reports that he was trying to get out the pool and then he misplaced his foot and fell down on to his left hip. Patient complaints of pain at hip site. No other complaints. Of note, patient had a hip fracture of his left hip in October 2021 and was [place] [facility] Patient was transferred to [facility] for evaluation. Orthopedist requested admission to medicine for medical management.    #Left hip fracture  Ortho following - sx planned for 7/5  Pain control  Endocrine consulted for bone health assessment - pt refused eval - will need outpt work up   Patients previous hip fracture did not heal properly     #DM - uncontrolled   ct Lantus 17 units BID  ct Lispro  7 units when eating  ISS  A1C -9    #Hyponatremia  Likely pseudohyponatremia given Elevated glucose  Monitor     #CKD3  Monitor Renal function    #A. fib  S/p ablation  C/w amiodarone  Not on AC    #Hypothyroid  C/w Synthroid    #HLD  Atorvastatin        DVT prophylaxis: Lovenox - no contraindications to proceed to OR

## 2022-07-03 NOTE — PROGRESS NOTE ADULT - SUBJECTIVE AND OBJECTIVE BOX
ELZBIETA CÁRDENAS    [id number]    51y      Male    CC: left hip pain s/p trivial trauma   h/o left hip fracture in past and [place] [facility]   denies any cardiac or pulm past medical history, non-smoker, no illicit drug use    INTERVAL HPI/OVERNIGHT EVENTS: no acute events     REVIEW OF SYSTEMS:    CONSTITUTIONAL: No fever,  fatigue  RESPIRATORY: No cough, wheezing,  No shortness of breath  CARDIOVASCULAR: No chest pain, palpitations  GASTROINTESTINAL: No abdominal or epigastric pain. No nausea, vomiting  NEUROLOGICAL: No headaches  MISCELLANEOUS:      Vital Signs Last 24 Hrs  T(C): 36.7 (03 Jul 2022 11:07), Max: 36.7 (02 Jul 2022 19:21)  T(F): 98 (03 Jul 2022 11:07), Max: 98.1 (02 Jul 2022 19:21)  HR: 74 (03 Jul 2022 11:07) (74 - 90)  BP: 115/77 (03 Jul 2022 11:07) (97/65 - 136/83)  BP(mean): --  RR: 20 (03 Jul 2022 11:07) (15 - 20)  SpO2: 98% (03 Jul 2022 11:07) (97% - 98%)    PHYSICAL EXAM:    GENERAL: NAD, well-groomed, laying in bed  HEENT: PERRL, +EOMI  NECK: soft, Supple  CHEST/LUNG: Clear to percussion bilaterally; No wheezing  HEART: S1S2+, Regular rate and rhythm; No murmur  ABDOMEN: Soft, Nontender, Nondistended; Bowel sounds present  EXTREMITIES:  No clubbing, cyanosis, or edema  SKIN: No rashes or lesions  NEURO: AAOX3        LABS:                        11.5   7.34  )-----------( 221      ( 03 Jul 2022 07:51 )             35.3     07-03    132<L>  |  95<L>  |  30.1<H>  ----------------------------<  277<H>  4.3   |  22.0  |  1.82<H>    Ca    9.1      03 Jul 2022 07:51    TPro  7.0  /  Alb  3.6  /  TBili  0.9  /  DBili  x   /  AST  19  /  ALT  21  /  AlkPhos  314<H>  07-03    PT/INR - ( 02 Jul 2022 08:42 )   PT: 12.6 sec;   INR: 1.09 ratio         PTT - ( 02 Jul 2022 08:42 )  PTT:31.2 sec        MEDICATIONS  (STANDING):  aMIOdarone    Tablet 200 milliGRAM(s) Oral daily  atorvastatin 20 milliGRAM(s) Oral <User Schedule>  calcium carbonate    500 mG (Tums) Chewable 1 Tablet(s) Chew two times a day  cholecalciferol 1000 Unit(s) Oral daily  cholecalciferol 5000 Unit(s) Oral daily  cyanocobalamin 1000 MICROGram(s) Oral daily  dextrose 5%. 1000 milliLiter(s) (100 mL/Hr) IV Continuous <Continuous>  dextrose 5%. 1000 milliLiter(s) (50 mL/Hr) IV Continuous <Continuous>  dextrose 50% Injectable 25 Gram(s) IV Push once  dextrose 50% Injectable 12.5 Gram(s) IV Push once  dextrose 50% Injectable 25 Gram(s) IV Push once  enoxaparin Injectable 40 milliGRAM(s) SubCutaneous every 24 hours  ferrous    sulfate 325 milliGRAM(s) Oral three times a day  folic acid 1 milliGRAM(s) Oral daily  glucagon  Injectable 1 milliGRAM(s) IntraMuscular once  insulin glargine Injectable (LANTUS) 17 Unit(s) SubCutaneous every morning  insulin glargine Injectable (LANTUS) 17 Unit(s) SubCutaneous at bedtime  insulin lispro (ADMELOG) corrective regimen sliding scale   SubCutaneous three times a day before meals  insulin lispro (ADMELOG) corrective regimen sliding scale   SubCutaneous at bedtime  insulin lispro Injectable (ADMELOG) 7 Unit(s) SubCutaneous three times a day before meals  levothyroxine 50 MICROGram(s) Oral daily  midodrine. 10 milliGRAM(s) Oral three times a day    MEDICATIONS  (PRN):  acetaminophen     Tablet .. 650 milliGRAM(s) Oral every 6 hours PRN Temp greater or equal to 38C (100.4F), Mild Pain (1 - 3)  aluminum hydroxide/magnesium hydroxide/simethicone Suspension 30 milliLiter(s) Oral every 4 hours PRN Dyspepsia  dextrose Oral Gel 15 Gram(s) Oral once PRN Blood Glucose LESS THAN 70 milliGRAM(s)/deciliter  melatonin 3 milliGRAM(s) Oral at bedtime PRN Insomnia  morphine  - Injectable 2 milliGRAM(s) IV Push every 4 hours PRN Moderate Pain (4 - 6)  morphine  - Injectable 4 milliGRAM(s) IV Push every 4 hours PRN Severe Pain (7 - 10)  ondansetron Injectable 4 milliGRAM(s) IV Push every 8 hours PRN Nausea and/or Vomiting      RADIOLOGY & ADDITIONAL TESTS:

## 2022-07-03 NOTE — PROGRESS NOTE ADULT - SUBJECTIVE AND OBJECTIVE BOX
Pt being followed for left hip periprosthetic fx.  Pt agreeable to surgical intervention with Dr. Kay pain to left hip has slightly improved                             11.5   7.34  )-----------( 221      ( 03 Jul 2022 07:51 )             35.3   07-03    132<L>  |  95<L>  |  30.1<H>  ----------------------------<  277<H>  4.3   |  22.0  |  1.82<H>    Ca    9.1      03 Jul 2022 07:51    TPro  7.0  /  Alb  3.6  /  TBili  0.9  /  DBili  x   /  AST  19  /  ALT  21  /  AlkPhos  314<H>  07-03                 PHYSICAL EXAM:    Appearance: Alert, responsive, in no acute distress.    Musculoskeletal:         Left Upper Extremity: No obvious deformity. NROM. Atraumatic. Nontender. Skin is clean, dry, and intact. No abrasions, ecchymosis, or erythema.        Right Upper Extremity: No obvious deformity. NROM. Atraumatic. Nontender. Skin is clean, dry, and intact. No abrasions, ecchymosis, or erythema.        Left Lower Extremity: + TTP to left hip. No TTP to rest of extremity. No open wounds noted. No bleeding. +DF/PF/EHL/FHL. 2+ DP pulse. Sensation grossly intact to light touch distally.        Right Lower Extremity: No obvious deformity. NROM. Atraumatic. Nontender. Skin is clean, dry, and intact. No abrasions, ecchymosis, or erythema.         A/P:  Pt is a  51y Male with Left hip nonunion fracture, periprosthetic fracture    PLAN:   * Plan and images discussed with Dr. Kay  * OR Tuesday pending medical optimization   * Rec Endo consult for bone health eval   * Bedrest   * Lovenox 40qd   * Continue care as per primary team

## 2022-07-04 ENCOUNTER — TRANSCRIPTION ENCOUNTER (OUTPATIENT)
Age: 52
End: 2022-07-04

## 2022-07-04 LAB
BLD GP AB SCN SERPL QL: SIGNIFICANT CHANGE UP
GLUCOSE BLDC GLUCOMTR-MCNC: 168 MG/DL — HIGH (ref 70–99)
GLUCOSE BLDC GLUCOMTR-MCNC: 217 MG/DL — HIGH (ref 70–99)
GLUCOSE BLDC GLUCOMTR-MCNC: 252 MG/DL — HIGH (ref 70–99)
GLUCOSE BLDC GLUCOMTR-MCNC: 261 MG/DL — HIGH (ref 70–99)
GLUCOSE BLDC GLUCOMTR-MCNC: 271 MG/DL — HIGH (ref 70–99)

## 2022-07-04 PROCEDURE — 99232 SBSQ HOSP IP/OBS MODERATE 35: CPT | Mod: 57

## 2022-07-04 PROCEDURE — 99232 SBSQ HOSP IP/OBS MODERATE 35: CPT

## 2022-07-04 RX ORDER — CEFAZOLIN SODIUM 1 G
2000 VIAL (EA) INJECTION ONCE
Refills: 0 | Status: COMPLETED | OUTPATIENT
Start: 2022-07-05 | End: 2022-07-06

## 2022-07-04 RX ORDER — POVIDONE-IODINE 5 %
1 AEROSOL (ML) TOPICAL ONCE
Refills: 0 | Status: COMPLETED | OUTPATIENT
Start: 2022-07-04 | End: 2022-07-05

## 2022-07-04 RX ORDER — VANCOMYCIN HCL 1 G
1500 VIAL (EA) INTRAVENOUS ONCE
Refills: 0 | Status: COMPLETED | OUTPATIENT
Start: 2022-07-05 | End: 2022-07-05

## 2022-07-04 RX ORDER — CHLORHEXIDINE GLUCONATE 213 G/1000ML
1 SOLUTION TOPICAL
Refills: 0 | Status: DISCONTINUED | OUTPATIENT
Start: 2022-07-04 | End: 2022-07-05

## 2022-07-04 RX ORDER — MUPIROCIN 20 MG/G
1 OINTMENT TOPICAL
Refills: 0 | Status: DISCONTINUED | OUTPATIENT
Start: 2022-07-04 | End: 2022-07-05

## 2022-07-04 RX ADMIN — Medication 2: at 08:17

## 2022-07-04 RX ADMIN — Medication 325 MILLIGRAM(S): at 21:52

## 2022-07-04 RX ADMIN — Medication 1: at 21:53

## 2022-07-04 RX ADMIN — MORPHINE SULFATE 4 MILLIGRAM(S): 50 CAPSULE, EXTENDED RELEASE ORAL at 11:46

## 2022-07-04 RX ADMIN — MORPHINE SULFATE 4 MILLIGRAM(S): 50 CAPSULE, EXTENDED RELEASE ORAL at 11:16

## 2022-07-04 RX ADMIN — AMIODARONE HYDROCHLORIDE 200 MILLIGRAM(S): 400 TABLET ORAL at 05:52

## 2022-07-04 RX ADMIN — MIDODRINE HYDROCHLORIDE 10 MILLIGRAM(S): 2.5 TABLET ORAL at 16:19

## 2022-07-04 RX ADMIN — Medication 5000 UNIT(S): at 11:17

## 2022-07-04 RX ADMIN — PREGABALIN 1000 MICROGRAM(S): 225 CAPSULE ORAL at 11:17

## 2022-07-04 RX ADMIN — Medication 1 TABLET(S): at 05:51

## 2022-07-04 RX ADMIN — INSULIN GLARGINE 17 UNIT(S): 100 INJECTION, SOLUTION SUBCUTANEOUS at 21:53

## 2022-07-04 RX ADMIN — Medication 1 TABLET(S): at 16:19

## 2022-07-04 RX ADMIN — ATORVASTATIN CALCIUM 20 MILLIGRAM(S): 80 TABLET, FILM COATED ORAL at 21:52

## 2022-07-04 RX ADMIN — INSULIN GLARGINE 17 UNIT(S): 100 INJECTION, SOLUTION SUBCUTANEOUS at 08:25

## 2022-07-04 RX ADMIN — MIDODRINE HYDROCHLORIDE 10 MILLIGRAM(S): 2.5 TABLET ORAL at 11:18

## 2022-07-04 RX ADMIN — Medication 1000 UNIT(S): at 11:17

## 2022-07-04 RX ADMIN — ENOXAPARIN SODIUM 40 MILLIGRAM(S): 100 INJECTION SUBCUTANEOUS at 08:18

## 2022-07-04 RX ADMIN — Medication 325 MILLIGRAM(S): at 05:52

## 2022-07-04 RX ADMIN — Medication 50 MICROGRAM(S): at 05:53

## 2022-07-04 RX ADMIN — Medication 1: at 16:18

## 2022-07-04 RX ADMIN — MIDODRINE HYDROCHLORIDE 10 MILLIGRAM(S): 2.5 TABLET ORAL at 05:52

## 2022-07-04 RX ADMIN — Medication 1 MILLIGRAM(S): at 11:17

## 2022-07-04 RX ADMIN — Medication 3: at 11:16

## 2022-07-04 RX ADMIN — Medication 325 MILLIGRAM(S): at 11:17

## 2022-07-04 NOTE — PROGRESS NOTE ADULT - SUBJECTIVE AND OBJECTIVE BOX
ORTHO-TRAUMA SERVICE      Pt Name: ELZBIETA CÁRDENAS    MRN: [id number]      Patient is a 51 y.o male being followed for Left periprosthetic femur fx. Patient seen and examined while in bed. Patient is comfortable with current pain management. Patient denies acute motor sensory changes overnight. Denies numbness, tingling beyond baseline neuropathy.       PAST MEDICAL & SURGICAL HISTORY:  PAST MEDICAL & SURGICAL HISTORY:  DM (diabetes mellitus)      History of left hip replacement        Allergies: Allergy Status Unknown      Medications: acetaminophen     Tablet .. 650 milliGRAM(s) Oral every 6 hours PRN  aluminum hydroxide/magnesium hydroxide/simethicone Suspension 30 milliLiter(s) Oral every 4 hours PRN  aMIOdarone    Tablet 200 milliGRAM(s) Oral daily  atorvastatin 20 milliGRAM(s) Oral <User Schedule>  calcium carbonate    500 mG (Tums) Chewable 1 Tablet(s) Chew two times a day  cholecalciferol 5000 Unit(s) Oral daily  cholecalciferol 1000 Unit(s) Oral daily  cyanocobalamin 1000 MICROGram(s) Oral daily  dextrose 5%. 1000 milliLiter(s) IV Continuous <Continuous>  dextrose 5%. 1000 milliLiter(s) IV Continuous <Continuous>  dextrose 50% Injectable 25 Gram(s) IV Push once  dextrose 50% Injectable 12.5 Gram(s) IV Push once  dextrose 50% Injectable 25 Gram(s) IV Push once  dextrose Oral Gel 15 Gram(s) Oral once PRN  enoxaparin Injectable 40 milliGRAM(s) SubCutaneous every 24 hours  ferrous    sulfate 325 milliGRAM(s) Oral three times a day  folic acid 1 milliGRAM(s) Oral daily  glucagon  Injectable 1 milliGRAM(s) IntraMuscular once  insulin glargine Injectable (LANTUS) 17 Unit(s) SubCutaneous every morning  insulin glargine Injectable (LANTUS) 17 Unit(s) SubCutaneous at bedtime  insulin lispro (ADMELOG) corrective regimen sliding scale   SubCutaneous three times a day before meals  insulin lispro (ADMELOG) corrective regimen sliding scale   SubCutaneous at bedtime  insulin lispro Injectable (ADMELOG) 7 Unit(s) SubCutaneous three times a day before meals  levothyroxine 50 MICROGram(s) Oral daily  melatonin 3 milliGRAM(s) Oral at bedtime PRN  midodrine. 10 milliGRAM(s) Oral three times a day  morphine  - Injectable 2 milliGRAM(s) IV Push every 4 hours PRN  morphine  - Injectable 4 milliGRAM(s) IV Push every 4 hours PRN  ondansetron Injectable 4 milliGRAM(s) IV Push every 8 hours PRN                            11.5   7.34  )-----------( 221      ( 03 Jul 2022 07:51 )             35.3     07-03    132<L>  |  95<L>  |  30.1<H>  ----------------------------<  277<H>  4.3   |  22.0  |  1.82<H>    Ca    9.1      03 Jul 2022 07:51    TPro  7.0  /  Alb  3.6  /  TBili  0.9  /  DBili  x   /  AST  19  /  ALT  21  /  AlkPhos  314<H>  07-03      PHYSICAL EXAM:    Vital Signs Last 24 Hrs  T(C): 37.1 (04 Jul 2022 09:49), Max: 37.1 (04 Jul 2022 09:49)  T(F): 98.7 (04 Jul 2022 09:49), Max: 98.7 (04 Jul 2022 09:49)  HR: 77 (04 Jul 2022 09:49) (75 - 82)  BP: 133/79 (04 Jul 2022 09:49) (133/79 - 170/99)  BP(mean): --  RR: 18 (04 Jul 2022 09:49) (16 - 18)  SpO2: 97% (04 Jul 2022 09:49) (96% - 98%)  Daily     Daily     Appearance: Alert, responsive, in no acute distress.    Neurological: Sensation is grossly intact to light touch. No focal deficits or weaknesses found.    Skin: no rash on visible skin. Skin is clean, dry and intact. No bleeding. No abrasions. No ulcerations.    Vascular: 2+ distal pulses. Cap refill < 2 sec. No signs of venous   insufficiency   or stasis. No extremity ulcerations. No cyanosis.    Musculoskeletal:           Left Lower Extremity: + TTP to left hip. No TTP to rest of extremity. No open wounds noted. No bleeding. +DF/PF/EHL/FHL. 2+ DP pulse. Sensation grossly intact to light touch distally. compartments soft nontender.       Right Lower Extremity: No obvious deformity. NROM. Atraumatic. Nontender. Skin is clean, dry, and intact. No abrasions, ecchymosis, or erythema.       A/P:  Pt is a  51y Male with Left hip nonunion fracture, periprosthetic fracture    PLAN:   * OR 7/5 with Dr. Kay  * Medical optimization   * Rec Endo consult reccomended  * Bedrest   * Lovenox 40qd d/c by midnight  * Continue care as per primary team

## 2022-07-04 NOTE — PROGRESS NOTE ADULT - NS ATTEND AMEND GEN_ALL_CORE FT
Orthopedic Surgery is ready to proceed with surgery pending medical optimization and adequate Operating Room availability. Risks of surgical delay discussed with other providers and staff. Please call with any questions or concerns.     Gayr Kay M.D.  Orthopaedic Trauma Surgery
Orthopaedic Trauma Surgeon Addendum:    I have personally performed a face-to-face diagnostic evaluation on this patient.  I have reviewed the physician assistant note and agree with the history, exam, and plan of care, except as noted.    Orthopedic Surgery is ready to proceed with surgery pending medical optimization and adequate Operating Room availability. Risks of surgical delay discussed with other providers and staff. Please call with any questions or concerns.     Gary Kay MD  Orthopaedic Trauma Surgeon  [facility] [place]

## 2022-07-04 NOTE — PROGRESS NOTE ADULT - ASSESSMENT
52 yo male with history of diabetes,  A. fib, depression, hypotension, HLD, hypothyroid initially presented to [facility] with complaints of pain in his left hip. Patient reports that he was trying to get out the pool and then he misplaced his foot and fell down on to his left hip. Patient complaints of pain at hip site. No other complaints. Of note, patient had a hip fracture of his left hip in October 2021 and was repaired at [facility]. Patient was transferred to [facility] for evaluation. Orthopedist requested admission to medicine for medical management.    #Left hip fracture  Ortho following - sx planned for 7/5  Pain control  Endocrine consulted for bone health assessment - pt refused eval - will need outpt work up   Patients previous hip fracture did not heal properly     #DM - uncontrolled   ct Lantus 17 units BID  ct Lispro  7 units when eating  ISS  A1C -9    #Hyponatremia  Likely pseudohyponatremia given Elevated glucose  Monitor     #CKD3  Monitor Renal function    #A. fib  S/p ablation  C/w amiodarone  Not on AC    #Hypothyroid  C/w Synthroid    #HLD  Atorvastatin        DVT prophylaxis: Lovenox - no contraindications to proceed to OR

## 2022-07-04 NOTE — PROGRESS NOTE ADULT - SUBJECTIVE AND OBJECTIVE BOX
ELZBIETA CÁRDENAS    [id number]    51y      Male    CC: left hip pain s/p trivial trauma   h/o left hip fracture in past and [place] [facility]   denies any cardiac or pulm past medical history, non-smoker, no illicit drug use c/o constipation     INTERVAL HPI/OVERNIGHT EVENTS: no acute events     REVIEW OF SYSTEMS:    CONSTITUTIONAL: No fever,  fatigue  RESPIRATORY: No cough, wheezing,  No shortness of breath  CARDIOVASCULAR: No chest pain, palpitations  GASTROINTESTINAL: No abdominal or epigastric pain. No nausea, vomiting  NEUROLOGICAL: No headaches  MISCELLANEOUS:      Vital Signs Last 24 Hrs  T(C): 37.1 (04 Jul 2022 09:49), Max: 37.1 (04 Jul 2022 09:49)  T(F): 98.7 (04 Jul 2022 09:49), Max: 98.7 (04 Jul 2022 09:49)  HR: 77 (04 Jul 2022 09:49) (75 - 82)  BP: 133/79 (04 Jul 2022 09:49) (133/79 - 170/99)  BP(mean): --  RR: 18 (04 Jul 2022 09:49) (16 - 18)  SpO2: 97% (04 Jul 2022 09:49) (96% - 98%)    PHYSICAL EXAM:    GENERAL: NAD, well-groomed, laying in bed  HEENT: PERRL, +EOMI  NECK: soft, Supple  CHEST/LUNG: Clear to percussion bilaterally; No wheezing  HEART: S1S2+, Regular rate and rhythm; No murmur  ABDOMEN: Soft, Nontender, Nondistended; Bowel sounds present  EXTREMITIES:  No clubbing, cyanosis, or edema  SKIN: No rashes or lesions  NEURO: AAOX3        LABS:                                           11.5   7.34  )-----------( 221      ( 03 Jul 2022 07:51 )             35.3   07-03    132<L>  |  95<L>  |  30.1<H>  ----------------------------<  277<H>  4.3   |  22.0  |  1.82<H>    Ca    9.1      03 Jul 2022 07:51    TPro  7.0  /  Alb  3.6  /  TBili  0.9  /  DBili  x   /  AST  19  /  ALT  21  /  AlkPhos  314<H>  07-03          MEDICATIONS  (STANDING):  aMIOdarone    Tablet 200 milliGRAM(s) Oral daily  atorvastatin 20 milliGRAM(s) Oral <User Schedule>  calcium carbonate    500 mG (Tums) Chewable 1 Tablet(s) Chew two times a day  cholecalciferol 5000 Unit(s) Oral daily  cholecalciferol 1000 Unit(s) Oral daily  cyanocobalamin 1000 MICROGram(s) Oral daily  dextrose 5%. 1000 milliLiter(s) (100 mL/Hr) IV Continuous <Continuous>  dextrose 5%. 1000 milliLiter(s) (50 mL/Hr) IV Continuous <Continuous>  dextrose 50% Injectable 25 Gram(s) IV Push once  dextrose 50% Injectable 12.5 Gram(s) IV Push once  dextrose 50% Injectable 25 Gram(s) IV Push once  enoxaparin Injectable 40 milliGRAM(s) SubCutaneous every 24 hours  ferrous    sulfate 325 milliGRAM(s) Oral three times a day  folic acid 1 milliGRAM(s) Oral daily  glucagon  Injectable 1 milliGRAM(s) IntraMuscular once  insulin glargine Injectable (LANTUS) 17 Unit(s) SubCutaneous every morning  insulin glargine Injectable (LANTUS) 17 Unit(s) SubCutaneous at bedtime  insulin lispro (ADMELOG) corrective regimen sliding scale   SubCutaneous three times a day before meals  insulin lispro (ADMELOG) corrective regimen sliding scale   SubCutaneous at bedtime  insulin lispro Injectable (ADMELOG) 7 Unit(s) SubCutaneous three times a day before meals  levothyroxine 50 MICROGram(s) Oral daily  midodrine. 10 milliGRAM(s) Oral three times a day    MEDICATIONS  (PRN):  acetaminophen     Tablet .. 650 milliGRAM(s) Oral every 6 hours PRN Temp greater or equal to 38C (100.4F), Mild Pain (1 - 3)  aluminum hydroxide/magnesium hydroxide/simethicone Suspension 30 milliLiter(s) Oral every 4 hours PRN Dyspepsia  dextrose Oral Gel 15 Gram(s) Oral once PRN Blood Glucose LESS THAN 70 milliGRAM(s)/deciliter  melatonin 3 milliGRAM(s) Oral at bedtime PRN Insomnia  morphine  - Injectable 2 milliGRAM(s) IV Push every 4 hours PRN Moderate Pain (4 - 6)  morphine  - Injectable 4 milliGRAM(s) IV Push every 4 hours PRN Severe Pain (7 - 10)  ondansetron Injectable 4 milliGRAM(s) IV Push every 8 hours PRN Nausea and/or Vomiting

## 2022-07-05 ENCOUNTER — TRANSCRIPTION ENCOUNTER (OUTPATIENT)
Age: 52
End: 2022-07-05

## 2022-07-05 LAB
ANION GAP SERPL CALC-SCNC: 14 MMOL/L — SIGNIFICANT CHANGE UP (ref 5–17)
BASOPHILS # BLD AUTO: 0.03 K/UL — SIGNIFICANT CHANGE UP (ref 0–0.2)
BASOPHILS NFR BLD AUTO: 0.2 % — SIGNIFICANT CHANGE UP (ref 0–2)
BUN SERPL-MCNC: 33.4 MG/DL — HIGH (ref 8–20)
CALCIUM SERPL-MCNC: 8.3 MG/DL — LOW (ref 8.6–10.2)
CHLORIDE SERPL-SCNC: 96 MMOL/L — LOW (ref 98–107)
CO2 SERPL-SCNC: 20 MMOL/L — LOW (ref 22–29)
CREAT SERPL-MCNC: 1.9 MG/DL — HIGH (ref 0.5–1.3)
EGFR: 42 ML/MIN/1.73M2 — LOW
EOSINOPHIL # BLD AUTO: 0 K/UL — SIGNIFICANT CHANGE UP (ref 0–0.5)
EOSINOPHIL NFR BLD AUTO: 0 % — SIGNIFICANT CHANGE UP (ref 0–6)
GLUCOSE BLDC GLUCOMTR-MCNC: 209 MG/DL — HIGH (ref 70–99)
GLUCOSE BLDC GLUCOMTR-MCNC: 330 MG/DL — HIGH (ref 70–99)
GLUCOSE BLDC GLUCOMTR-MCNC: 346 MG/DL — HIGH (ref 70–99)
GLUCOSE BLDC GLUCOMTR-MCNC: 437 MG/DL — HIGH (ref 70–99)
GLUCOSE SERPL-MCNC: 349 MG/DL — HIGH (ref 70–99)
HCT VFR BLD CALC: 35.3 % — LOW (ref 39–50)
HCT VFR BLD CALC: 37.4 % — LOW (ref 39–50)
HGB BLD-MCNC: 11.5 G/DL — LOW (ref 13–17)
HGB BLD-MCNC: 12.1 G/DL — LOW (ref 13–17)
IMM GRANULOCYTES NFR BLD AUTO: 0.4 % — SIGNIFICANT CHANGE UP (ref 0–1.5)
LYMPHOCYTES # BLD AUTO: 0.51 K/UL — LOW (ref 1–3.3)
LYMPHOCYTES # BLD AUTO: 2.9 % — LOW (ref 13–44)
MCHC RBC-ENTMCNC: 30.3 PG — SIGNIFICANT CHANGE UP (ref 27–34)
MCHC RBC-ENTMCNC: 30.4 PG — SIGNIFICANT CHANGE UP (ref 27–34)
MCHC RBC-ENTMCNC: 32.4 GM/DL — SIGNIFICANT CHANGE UP (ref 32–36)
MCHC RBC-ENTMCNC: 32.6 GM/DL — SIGNIFICANT CHANGE UP (ref 32–36)
MCV RBC AUTO: 93.4 FL — SIGNIFICANT CHANGE UP (ref 80–100)
MCV RBC AUTO: 93.5 FL — SIGNIFICANT CHANGE UP (ref 80–100)
MONOCYTES # BLD AUTO: 0.98 K/UL — HIGH (ref 0–0.9)
MONOCYTES NFR BLD AUTO: 5.5 % — SIGNIFICANT CHANGE UP (ref 2–14)
NEUTROPHILS # BLD AUTO: 16.19 K/UL — HIGH (ref 1.8–7.4)
NEUTROPHILS NFR BLD AUTO: 91 % — HIGH (ref 43–77)
PLATELET # BLD AUTO: 268 K/UL — SIGNIFICANT CHANGE UP (ref 150–400)
PLATELET # BLD AUTO: 296 K/UL — SIGNIFICANT CHANGE UP (ref 150–400)
POTASSIUM SERPL-MCNC: 5 MMOL/L — SIGNIFICANT CHANGE UP (ref 3.5–5.3)
POTASSIUM SERPL-SCNC: 5 MMOL/L — SIGNIFICANT CHANGE UP (ref 3.5–5.3)
RBC # BLD: 3.78 M/UL — LOW (ref 4.2–5.8)
RBC # BLD: 4 M/UL — LOW (ref 4.2–5.8)
RBC # FLD: 13 % — SIGNIFICANT CHANGE UP (ref 10.3–14.5)
RBC # FLD: 13.2 % — SIGNIFICANT CHANGE UP (ref 10.3–14.5)
SARS-COV-2 RNA SPEC QL NAA+PROBE: SIGNIFICANT CHANGE UP
SODIUM SERPL-SCNC: 130 MMOL/L — LOW (ref 135–145)
WBC # BLD: 17.79 K/UL — HIGH (ref 3.8–10.5)
WBC # BLD: 17.84 K/UL — HIGH (ref 3.8–10.5)
WBC # FLD AUTO: 17.79 K/UL — HIGH (ref 3.8–10.5)
WBC # FLD AUTO: 17.84 K/UL — HIGH (ref 3.8–10.5)

## 2022-07-05 PROCEDURE — 99233 SBSQ HOSP IP/OBS HIGH 50: CPT

## 2022-07-05 PROCEDURE — 27506 TREATMENT OF THIGH FRACTURE: CPT | Mod: LT

## 2022-07-05 PROCEDURE — 73552 X-RAY EXAM OF FEMUR 2/>: CPT | Mod: 26,LT

## 2022-07-05 PROCEDURE — 27095 INJECTION FOR HIP X-RAY: CPT | Mod: LT

## 2022-07-05 PROCEDURE — 27472 REPAIR/GRAFT OF THIGH: CPT | Mod: LT

## 2022-07-05 DEVICE — GRAFT BONE INJ TRAUMACEM TM V PLUS BONE CEMENT: Type: IMPLANTABLE DEVICE | Status: FUNCTIONAL

## 2022-07-05 DEVICE — GRAFT BONE INJ CANN TRAUMACEM FOR TFNA: Type: IMPLANTABLE DEVICE | Status: FUNCTIONAL

## 2022-07-05 DEVICE — BLADE TFNA HELICAL 100MM STRL: Type: IMPLANTABLE DEVICE | Status: FUNCTIONAL

## 2022-07-05 DEVICE — GUIDEWIRE RD REAMING W/BALL TIP 2.5: Type: IMPLANTABLE DEVICE | Status: FUNCTIONAL

## 2022-07-05 DEVICE — KIT SYRINGE TRAUMACEM V PLUS STRL: Type: IMPLANTABLE DEVICE | Status: FUNCTIONAL

## 2022-07-05 DEVICE — IMPLANTABLE DEVICE: Type: IMPLANTABLE DEVICE | Status: FUNCTIONAL

## 2022-07-05 DEVICE — SCREW LOKG 5X52MM: Type: IMPLANTABLE DEVICE | Status: FUNCTIONAL

## 2022-07-05 RX ORDER — INSULIN LISPRO 100/ML
VIAL (ML) SUBCUTANEOUS
Refills: 0 | Status: DISCONTINUED | OUTPATIENT
Start: 2022-07-05 | End: 2022-07-05

## 2022-07-05 RX ORDER — OXYCODONE HYDROCHLORIDE 5 MG/1
5 TABLET ORAL
Refills: 0 | Status: DISCONTINUED | OUTPATIENT
Start: 2022-07-05 | End: 2022-07-09

## 2022-07-05 RX ORDER — HYDROMORPHONE HYDROCHLORIDE 2 MG/ML
0.5 INJECTION INTRAMUSCULAR; INTRAVENOUS; SUBCUTANEOUS
Refills: 0 | Status: DISCONTINUED | OUTPATIENT
Start: 2022-07-05 | End: 2022-07-05

## 2022-07-05 RX ORDER — INSULIN LISPRO 100/ML
VIAL (ML) SUBCUTANEOUS
Refills: 0 | Status: DISCONTINUED | OUTPATIENT
Start: 2022-07-05 | End: 2022-07-06

## 2022-07-05 RX ORDER — MAGNESIUM HYDROXIDE 400 MG/1
30 TABLET, CHEWABLE ORAL DAILY
Refills: 0 | Status: DISCONTINUED | OUTPATIENT
Start: 2022-07-05 | End: 2022-07-20

## 2022-07-05 RX ORDER — INSULIN LISPRO 100/ML
7 VIAL (ML) SUBCUTANEOUS
Refills: 0 | Status: DISCONTINUED | OUTPATIENT
Start: 2022-07-05 | End: 2022-07-06

## 2022-07-05 RX ORDER — INSULIN GLARGINE 100 [IU]/ML
17 INJECTION, SOLUTION SUBCUTANEOUS AT BEDTIME
Refills: 0 | Status: DISCONTINUED | OUTPATIENT
Start: 2022-07-05 | End: 2022-07-07

## 2022-07-05 RX ORDER — DEXTROSE 50 % IN WATER 50 %
12.5 SYRINGE (ML) INTRAVENOUS ONCE
Refills: 0 | Status: DISCONTINUED | OUTPATIENT
Start: 2022-07-05 | End: 2022-07-20

## 2022-07-05 RX ORDER — SODIUM CHLORIDE 9 MG/ML
1000 INJECTION, SOLUTION INTRAVENOUS
Refills: 0 | Status: DISCONTINUED | OUTPATIENT
Start: 2022-07-05 | End: 2022-07-20

## 2022-07-05 RX ORDER — GLUCAGON INJECTION, SOLUTION 0.5 MG/.1ML
1 INJECTION, SOLUTION SUBCUTANEOUS ONCE
Refills: 0 | Status: DISCONTINUED | OUTPATIENT
Start: 2022-07-05 | End: 2022-07-20

## 2022-07-05 RX ORDER — DEXTROSE 50 % IN WATER 50 %
15 SYRINGE (ML) INTRAVENOUS ONCE
Refills: 0 | Status: DISCONTINUED | OUTPATIENT
Start: 2022-07-05 | End: 2022-07-20

## 2022-07-05 RX ORDER — SODIUM CHLORIDE 9 MG/ML
500 INJECTION INTRAMUSCULAR; INTRAVENOUS; SUBCUTANEOUS ONCE
Refills: 0 | Status: COMPLETED | OUTPATIENT
Start: 2022-07-05 | End: 2022-07-05

## 2022-07-05 RX ORDER — ONDANSETRON 8 MG/1
4 TABLET, FILM COATED ORAL ONCE
Refills: 0 | Status: DISCONTINUED | OUTPATIENT
Start: 2022-07-05 | End: 2022-07-05

## 2022-07-05 RX ORDER — HYDROMORPHONE HYDROCHLORIDE 2 MG/ML
0.5 INJECTION INTRAMUSCULAR; INTRAVENOUS; SUBCUTANEOUS
Refills: 0 | Status: DISCONTINUED | OUTPATIENT
Start: 2022-07-05 | End: 2022-07-12

## 2022-07-05 RX ORDER — HYDROMORPHONE HYDROCHLORIDE 2 MG/ML
4 INJECTION INTRAMUSCULAR; INTRAVENOUS; SUBCUTANEOUS EVERY 4 HOURS
Refills: 0 | Status: DISCONTINUED | OUTPATIENT
Start: 2022-07-05 | End: 2022-07-07

## 2022-07-05 RX ORDER — INSULIN LISPRO 100/ML
VIAL (ML) SUBCUTANEOUS AT BEDTIME
Refills: 0 | Status: DISCONTINUED | OUTPATIENT
Start: 2022-07-05 | End: 2022-07-06

## 2022-07-05 RX ORDER — INSULIN GLARGINE 100 [IU]/ML
17 INJECTION, SOLUTION SUBCUTANEOUS EVERY MORNING
Refills: 0 | Status: DISCONTINUED | OUTPATIENT
Start: 2022-07-05 | End: 2022-07-07

## 2022-07-05 RX ORDER — ONDANSETRON 8 MG/1
4 TABLET, FILM COATED ORAL EVERY 6 HOURS
Refills: 0 | Status: DISCONTINUED | OUTPATIENT
Start: 2022-07-05 | End: 2022-07-20

## 2022-07-05 RX ORDER — DEXTROSE 50 % IN WATER 50 %
25 SYRINGE (ML) INTRAVENOUS ONCE
Refills: 0 | Status: DISCONTINUED | OUTPATIENT
Start: 2022-07-05 | End: 2022-07-20

## 2022-07-05 RX ORDER — VANCOMYCIN HCL 1 G
1500 VIAL (EA) INTRAVENOUS ONCE
Refills: 0 | Status: COMPLETED | OUTPATIENT
Start: 2022-07-05 | End: 2022-07-05

## 2022-07-05 RX ORDER — OXYCODONE HYDROCHLORIDE 5 MG/1
10 TABLET ORAL
Refills: 0 | Status: DISCONTINUED | OUTPATIENT
Start: 2022-07-05 | End: 2022-07-12

## 2022-07-05 RX ADMIN — Medication 4: at 13:30

## 2022-07-05 RX ADMIN — MUPIROCIN 1 APPLICATION(S): 20 OINTMENT TOPICAL at 06:42

## 2022-07-05 RX ADMIN — HYDROMORPHONE HYDROCHLORIDE 0.5 MILLIGRAM(S): 2 INJECTION INTRAMUSCULAR; INTRAVENOUS; SUBCUTANEOUS at 21:54

## 2022-07-05 RX ADMIN — HYDROMORPHONE HYDROCHLORIDE 0.5 MILLIGRAM(S): 2 INJECTION INTRAMUSCULAR; INTRAVENOUS; SUBCUTANEOUS at 13:45

## 2022-07-05 RX ADMIN — CHLORHEXIDINE GLUCONATE 1 APPLICATION(S): 213 SOLUTION TOPICAL at 06:08

## 2022-07-05 RX ADMIN — Medication 300 MILLIGRAM(S): at 19:58

## 2022-07-05 RX ADMIN — Medication 7 UNIT(S): at 16:52

## 2022-07-05 RX ADMIN — Medication 300 MILLIGRAM(S): at 07:58

## 2022-07-05 RX ADMIN — SODIUM CHLORIDE 500 MILLILITER(S): 9 INJECTION INTRAMUSCULAR; INTRAVENOUS; SUBCUTANEOUS at 13:56

## 2022-07-05 RX ADMIN — MIDODRINE HYDROCHLORIDE 10 MILLIGRAM(S): 2.5 TABLET ORAL at 06:07

## 2022-07-05 RX ADMIN — Medication 4: at 22:03

## 2022-07-05 RX ADMIN — AMIODARONE HYDROCHLORIDE 200 MILLIGRAM(S): 400 TABLET ORAL at 06:07

## 2022-07-05 RX ADMIN — Medication 325 MILLIGRAM(S): at 06:07

## 2022-07-05 RX ADMIN — Medication 1 APPLICATION(S): at 08:03

## 2022-07-05 RX ADMIN — Medication 50 MICROGRAM(S): at 06:07

## 2022-07-05 RX ADMIN — HYDROMORPHONE HYDROCHLORIDE 0.5 MILLIGRAM(S): 2 INJECTION INTRAMUSCULAR; INTRAVENOUS; SUBCUTANEOUS at 22:15

## 2022-07-05 RX ADMIN — INSULIN GLARGINE 17 UNIT(S): 100 INJECTION, SOLUTION SUBCUTANEOUS at 21:58

## 2022-07-05 RX ADMIN — OXYCODONE HYDROCHLORIDE 10 MILLIGRAM(S): 5 TABLET ORAL at 20:40

## 2022-07-05 RX ADMIN — OXYCODONE HYDROCHLORIDE 10 MILLIGRAM(S): 5 TABLET ORAL at 19:56

## 2022-07-05 RX ADMIN — HYDROMORPHONE HYDROCHLORIDE 0.5 MILLIGRAM(S): 2 INJECTION INTRAMUSCULAR; INTRAVENOUS; SUBCUTANEOUS at 14:14

## 2022-07-05 RX ADMIN — Medication 4: at 16:52

## 2022-07-05 RX ADMIN — INSULIN GLARGINE 17 UNIT(S): 100 INJECTION, SOLUTION SUBCUTANEOUS at 14:50

## 2022-07-05 NOTE — DISCHARGE NOTE PROVIDER - NSDCFUADDINST_GEN_ALL_CORE_FT
ORTHO: WBAT as tolerated Left Lower extremity. Continue DVT prophylaxis for weeks as prescribed, Lovenox or equivalent. Follow-up with Dr. Kay in 2 weeks.   The patient will be seen in the office between 2-3 weeks wound check. PLEASE CALL OFFICE TO CONFIRM APPOINTMENT  -Staples will be removed at that time.   - Patient may shower on post-op day #5 ( 7/10)  - Patient may remove dressing on post-op day #9 ( 7/14)  - The patient will continue with splint as applied in hospital and not remove until re-evaluation in the office.  - The patient will contact the office if the wound becomes red, has increasing pain, develops bleeding or discharge, an injury occurs, or has other concerns.   - The patient will take pain medication for pain control and titrate according to prescription and patient needs.   - The patient is FULL weight bearing on the Left Lower extremity  - The patient is recommended to elevated the affected extremity to reduce swelling. If the splint/cast  becomes too tight, the patient is to immediately elevate and contact the office to discuss further management or immediately proceed to the office if unable to make contact with the office. ORTHO: WBAT as tolerated Left Lower extremity. Continue DVT prophylaxis for weeks as prescribed, Lovenox or equivalent. Follow-up with Dr. Kay in 2 weeks.   The patient will be seen in the office between 2-3 weeks wound check. PLEASE CALL OFFICE TO CONFIRM APPOINTMENT  -Staples will be removed at that time.   - Patient may shower on post-op day #5 ( 7/10)  - Patient may remove dressing on post-op day #13 ( 7/18).  - The patient will continue with splint as applied in hospital and not remove until re-evaluation in the office.  - The patient will contact the office if the wound becomes red, has increasing pain, develops bleeding or discharge, an injury occurs, or has other concerns.   - The patient will take pain medication for pain control and titrate according to prescription and patient needs.   - The patient is FULL weight bearing on the Left Lower extremity  - The patient is recommended to elevated the affected extremity to reduce swelling. If the splint/cast  becomes too tight, the patient is to immediately elevate and contact the office to discuss further management or immediately proceed to the office if unable to make contact with the office.    Change dressing left LE as needed with clean gauze and tegaderm. Call Dr. Kay's office if drainage persists.

## 2022-07-05 NOTE — BRIEF OPERATIVE NOTE - NSICDXBRIEFPREOP_GEN_ALL_CORE_FT
PRE-OP DIAGNOSIS:  Closed fracture of left hip with nonunion 05-Jul-2022 12:37:07  Gary Kay  Closed fracture of left femur 05-Jul-2022 12:37:46  Gary Kay

## 2022-07-05 NOTE — DISCHARGE NOTE PROVIDER - HOSPITAL COURSE
52yo M w history of diabetes,  A. fib, depression, hypotension, HLD, hypothyroid initially presented to [facility] with complaints of pain in his left hip. Patient reports that he was trying to get out the pool and then he misplaced his foot and fell down on to his left hip. Patient complaints of pain at hip site. No other complaints. Of note, patient had a hip fracture of his left hip in October 2021 and was repaired at [facility]. Patient was transferred to [facility] for evaluation. Orthopedist requested admission to medicine for medical management. Ortho consulted Patient underwent to Left femur fracture , tolerated well procedure . Patient was treated with  pain management . Ortho recs WBAT LLE . Patient will be discharge to Acute Rehab at [facility] .  #Orthostatic hypotension  home dose 10mg, increased to 15mg 7/8. Trendelenburg as needed  . Advice pt bring abdominal binder from home . bilateral lower ext ACE wrap at all time when awake. #Anemia, postop acute blood loss.   transfuse if hb<7 or symptomatic.  CBC prior to discharge.  #DM - uncontrolled  atypical insulin regimen at home, ISS, monitor BS A1C -9 . hypoglyemic episode likely due to decrease oral intake . DC premeal, will adjust Lantus pending PM BGM    PAtient had  Hyponatremia Corrected sodium is 132->133, moniotred BMP, treated with NS .  Monitor BMP after Dc . PAtient had #CKD3, Monitored Renal function . We avoided  nephrotoxics. monitored intake and output.  #Afib S/p ablation.   Patient improving, . Patient will require Acute rehab .   Vital Signs Last 24 Hrs  T(C): 36.9 (13 Jul 2022 04:23), Max: 36.9 (13 Jul 2022 04:23)  T(F): 98.5 (13 Jul 2022 04:23), Max: 98.5 (13 Jul 2022 04:23)  HR: 77 (13 Jul 2022 04:23) (71 - 77)  BP: 134/79 (13 Jul 2022 04:23) (104/68 - 163/72)  BP(mean): --  RR: 18 (13 Jul 2022 04:23) (18 - 18)  SpO2: 93% (13 Jul 2022 04:23) (93% - 99%)    Parameters below as of 13 Jul 2022 04:23  Patient On (Oxygen Delivery Method): room air    Constitutional: NAD, awake    HEENT: PERR, EOMI,   Neck: Soft and supple,    Respiratory: Breath sounds are clear bilaterally,   Cardiovascular: S1 and S2,    Gastrointestinal: Bowel Sounds present, soft, nontender,    Extremities: left lower ext +2   Vascular: 2+ peripheral pulses  Neurological: A/O x 3  07-13    133<L>  |  97<L>  |  18.6  ----------------------------<  283<H>  4.4   |  23.0  |  1.43<H>    Ca    8.4<L>      13 Jul 2022 05:33                              8.1    12.37 )-----------( 325      ( 13 Jul 2022 05:33 )             25.8                      50yo M w history of diabetes,  A. fib, depression, hypotension, HLD, hypothyroid initially presented to [facility] with complaints of pain in his left hip. Patient reports that he was trying to get out the pool and then he misplaced his foot and fell down on to his left hip. Patient complaints of pain at hip site. No other complaints. Of note, patient had a hip fracture of his left hip in October 2021 and was repaired at [facility] [place]. Patient was transferred to [facility] for evaluation. Orthopedist requested admission to medicine for medical management. Ortho consulted Patient underwent to Left femur fracture , tolerated well procedure . Patient was treated with  pain management . Ortho recs WBAT LLE . Patient will be discharge to Acute Rehab at [facility] .  #Orthostatic hypotension  home dose 10mg, increased to 15mg 7/8. Trendelenburg as needed  . Advice pt bring abdominal binder from home . bilateral lower ext ACE wrap at all time when awake. #Anemia, postop acute blood loss. transfuse if hb<7 or symptomatic.  CBC prior to discharge.  #DM - uncontrolled  atypical insulin regimen at home, ISS, monitor BS A1C -9 . hypoglyemic episode likely due to decrease oral intake . DC premeal, will adjust Lantus pending PM BGM  . Patient had  Hyponatremia Corrected sodium is 132->133->134, moniotred BMP, treated with NS .  Monitor BMP after Dc . PAtient had #CKD3, Monitored Renal function . We avoided  nephrotoxics. monitored intake and output.  #Afib S/p ablation.   Patient improving, . Patient will require Acute rehab .   Vital Signs Last 24 Hrs  T(C): 36.6 (15 Jul 2022 08:03), Max: 36.8 (14 Jul 2022 10:05)  T(F): 97.9 (15 Jul 2022 08:03), Max: 98.2 (14 Jul 2022 10:05)  HR: 66 (15 Jul 2022 08:03) (66 - 105)  BP: 155/83 (15 Jul 2022 08:03) (109/65 - 181/79)  BP(mean): --  RR: 18 (15 Jul 2022 08:03) (18 - 19)  SpO2: 98% (15 Jul 2022 08:03) (97% - 100%)  Parameters below as of 15 Jul 2022 08:03  Patient On (Oxygen Delivery Method): room air  Constitutional: NAD, awake    HEENT: PERR, EOMI,   Neck: Soft and supple,    Respiratory: Breath sounds are clear bilaterally,   Cardiovascular: S1 and S2,    Gastrointestinal: Bowel Sounds present, soft, nontender,    Extremities: left lower ext +2   Vascular: 2+ peripheral pulses  Left Hip wound covered with Dressing , no bleeding   Neurological: A/O x 3  07-13    133<L>  |  97<L>  |  18.6  ----------------------------<  283<H>  4.4   |  23.0  |  1.43<H>    Ca    8.4<L>      13 Jul 2022 05:33                              8.1    12.37 )-----------( 325      ( 13 Jul 2022 05:33 )             25.8                      50yo M w history of diabetes,  A. fib, depression, hypotension, HLD, hypothyroid initially presented to [facility] with complaints of pain in his left hip. Patient reports that he was trying to get out the pool and then he misplaced his foot and fell down on to his left hip. Patient complaints of pain at hip site. No other complaints. Of note, patient had a hip fracture of his left hip in October 2021 and was repaired at [facility]. Patient was transferred to [facility] for evaluation. Orthopedist requested admission to medicine for medical management. Ortho consulted Patient underwent to    ORIF, fracture nonunion, femur, with graft application 05-Jul-2022 12:36:13  Gary Kay  Intramedullary nail fixation of left femur 05-Jul-2022 12:36:34  Gary Kay.    hospital course complicated by fainting on 7/11    . Patient was treated with  pain management . Ortho recs WBAT LLE . Patient will be discharge to Acute Rehab at [facility] .    #Orthostatic hypotension  home dose 10mg, increased to 15mg 7/8. dvice pt bring abdominal binder from home . bilateral lower ext ACE wrap at all time when awake.   #Anemia, postop acute blood loss. transfuse if hb<7 or symptomatic.  CBC prior to discharge.    #DM - uncontrolled  atypical insulin regimen at home, ISS, monitor BS A1C -9 . hypoglyemic episode likely due to decrease oral intake . DC premeal, will adjust Lantus pending PM BGM    . PAtient had #CKD3, Monitored Renal function . We avoided  nephrotoxics. monitored intake and output.    #Afib S/p ablation.       Constitutional: NAD, awake    HEENT: PERR, EOMI,   Neck: Soft and supple,    Respiratory: Breath sounds are clear bilaterally,   Cardiovascular: S1 and S2,    Gastrointestinal: Bowel Sounds present, soft, nontender,    Extremities: left lower ext +2   Vascular: 2+ peripheral pulses  Left Hip wound covered with Dressing , no bleeding                    50yo M w history of diabetes,  A. fib, depression, hypotension, HLD, hypothyroid [place] [facility] [place] of [place] in [place]. Patient reports that he was trying to get out the pool and then he misplaced his foot and fell down on to his left hip. Patient complaints of pain at hip site. No other complaints. Of note, patient had a hip fracture of his left hip in October 2021 and [place] [facility] [place] was transferred to [facility] for evaluation. Orthopedist requested admission to medicine for medical management. Ortho consulted Patient underwent     ORIF, fracture nonunion, femur, with graft application 05-Jul-2022 12:36:13  Gary Kay  Intramedullary nail fixation of left femur 05-Jul-2022 12:36:34  Gary Kay.    hospital course complicated by fainting on 7/11    . Patient was treated with  pain management . Ortho recs WBAT LLE . Patient will be discharge to Acute Rehab at [facility] .    #Orthostatic hypotension  home dose 10mg, increased to 15mg 7/8. dvice pt bring abdominal binder from home . bilateral lower ext ACE wrap at all time when awake.   #Anemia, postop acute blood loss. transfuse if hb<7 or symptomatic.  CBC prior to discharge.    #DM - uncontrolled  atypical insulin regimen at home, ISS, monitor BS A1C -9 . hypoglyemic episode likely due to decrease oral intake . DC premeal, will adjust Lantus pending PM BGM    . PAtient had #CKD3, Monitored Renal function . We avoided  nephrotoxics. monitored intake and output.    #Afib S/p ablation.       Constitutional: NAD, awake    HEENT: PERR, EOMI,   Neck: Soft and supple,    Respiratory: Breath sounds are clear bilaterally,   Cardiovascular: S1 and S2,    Gastrointestinal: Bowel Sounds present, soft, nontender,    Extremities: left lower ext +2   Vascular: 2+ peripheral pulses  Left Hip wound covered with Dressing , no bleeding                    50yo M w history of diabetes,  A. fib, depression, hypotension, HLD, hypothyroid [place] [facility] of [place] in his [place]. Patient reports that he was trying to get out the pool and then he misplaced his foot and fell down on to his left hip. Patient complaints of pain at hip site. No other complaints. Of note, patient had a hip fracture of his left hip in October 2021 and [place] [facility] was transferred to [facility] for evaluation. Orthopedist requested admission to medicine for medical management. Ortho consulted Patient underwent     ORIF, fracture nonunion, femur, with graft application 05-Jul-2022 12:36:13  Gary Kay  Intramedullary nail fixation of left femur 05-Jul-2022 12:36:34  Gary aKy.    hospital course complicated by fainting on 7/11    . Patient was treated with  pain management . Ortho recs WBAT LLE . Patient will be discharge to home with home care    #Orthostatic hypotension  home dose 10mg, increased to 15mg 7/8. dvice pt bring abdominal binder from home . bilateral lower ext ACE wrap at all time when awake.   #Anemia, postop acute blood loss. transfuse if hb<7 or symptomatic.  CBC prior to discharge.    #DM - uncontrolled  atypical insulin regimen at home, ISS, monitor BS A1C -9 . hypoglyemic episode likely due to decrease oral intake . DC premeal, will adjust Lantus pending PM BGM    . PAtient had #CKD3, Monitored Renal function . We avoided  nephrotoxics. monitored intake and output.    #Afib S/p ablation.       Constitutional: NAD, awake    HEENT: PERR, EOMI,   Neck: Soft and supple,    Respiratory: Breath sounds are clear bilaterally,   Cardiovascular: S1 and S2,    Gastrointestinal: Bowel Sounds present, soft, nontender,    Extremities: left lower ext +2   Vascular: 2+ peripheral pulses  Left Hip wound covered with Dressing , no bleeding

## 2022-07-05 NOTE — DISCHARGE NOTE PROVIDER - ATTENDING DISCHARGE PHYSICAL EXAMINATION:
Constitutional: NAD, awake    HEENT: PERR, EOMI,   Neck: Soft and supple,    Respiratory: Breath sounds are clear bilaterally,   Cardiovascular: S1 and S2,    Gastrointestinal: Bowel Sounds present, soft, nontender,    Extremities: left lower ext +2   Vascular: 2+ peripheral pulses  Left Hip wound covered with Dressing , no bleeding

## 2022-07-05 NOTE — PROGRESS NOTE ADULT - SUBJECTIVE AND OBJECTIVE BOX
ELZBIETA CÁRDENAS    [id number]    51y      Male    CC: left hip pain s/p trivial trauma   h/o left hip fracture in past and [place] [facility] [place]   seen in PACU post operatively, doing well, denies any nausea.       INTERVAL HPI/OVERNIGHT EVENTS: no acute events     REVIEW OF SYSTEMS:    CONSTITUTIONAL: No fever,  fatigue  RESPIRATORY: No cough, wheezing,  No shortness of breath  CARDIOVASCULAR: No chest pain, palpitations  GASTROINTESTINAL: No abdominal or epigastric pain. No nausea, vomiting      Vital Signs Last 24 Hrs  T(C): 36.8 (05 Jul 2022 14:30), Max: 37.5 (05 Jul 2022 07:41)  T(F): 98.2 (05 Jul 2022 14:30), Max: 99.5 (05 Jul 2022 07:41)  HR: 89 (05 Jul 2022 15:00) (67 - 89)  BP: 154/67 (05 Jul 2022 15:00) (126/62 - 158/89)  BP(mean): 89 (05 Jul 2022 15:00) (89 - 89)  RR: 15 (05 Jul 2022 15:00) (14 - 18)  SpO2: 100% (05 Jul 2022 15:00) (95% - 100%)    PHYSICAL EXAM:    MEDICATIONS  (STANDING):  dextrose 5%. 1000 milliLiter(s) (50 mL/Hr) IV Continuous <Continuous>  dextrose 5%. 1000 milliLiter(s) (100 mL/Hr) IV Continuous <Continuous>  dextrose 50% Injectable 25 Gram(s) IV Push once  dextrose 50% Injectable 12.5 Gram(s) IV Push once  dextrose 50% Injectable 25 Gram(s) IV Push once  glucagon  Injectable 1 milliGRAM(s) IntraMuscular once  insulin glargine Injectable (LANTUS) 17 Unit(s) SubCutaneous every morning  insulin glargine Injectable (LANTUS) 17 Unit(s) SubCutaneous at bedtime  insulin lispro (ADMELOG) corrective regimen sliding scale   SubCutaneous three times a day before meals  insulin lispro (ADMELOG) corrective regimen sliding scale   SubCutaneous at bedtime  insulin lispro Injectable (ADMELOG) 7 Unit(s) SubCutaneous three times a day before meals  vancomycin  IVPB 1500 milliGRAM(s) IV Intermittent once    MEDICATIONS  (PRN):  dextrose Oral Gel 15 Gram(s) Oral once PRN Blood Glucose LESS THAN 70 milliGRAM(s)/deciliter  HYDROmorphone   Tablet 4 milliGRAM(s) Oral every 4 hours PRN Severe Pain (7 - 10)  HYDROmorphone  Injectable 0.5 milliGRAM(s) IV Push every 3 hours PRN Breakthrough  HYDROmorphone  Injectable 0.5 milliGRAM(s) IV Push every 10 minutes PRN Moderate Pain (4 - 6)  magnesium hydroxide Suspension 30 milliLiter(s) Oral daily PRN Constipation  ondansetron Injectable 4 milliGRAM(s) IV Push once PRN Nausea and/or Vomiting  ondansetron Injectable 4 milliGRAM(s) IV Push every 6 hours PRN Nausea and/or Vomiting  oxyCODONE    IR 10 milliGRAM(s) Oral every 3 hours PRN Moderate Pain (4 - 6)  oxyCODONE    IR 5 milliGRAM(s) Oral every 3 hours PRN Mild Pain (1 - 3)  GENERAL: NAD, well-groomed, laying in bed  HEENT: PERRL, +EOMI  NECK: soft, Supple  CHEST/LUNG: Clear to percussion bilaterally; No wheezing  HEART: S1S2+, Regular rate and rhythm; No murmur  ABDOMEN: Soft, Nontender, Nondistended; Bowel sounds present  EXTREMITIES:  No clubbing, cyanosis, or edema. RLE in wrap, prevena+  SKIN: No rashes or lesions  NEURO: AAOX3        LABS:                                             12.1   17.84 )-----------( 296      ( 05 Jul 2022 14:09 )             37.4

## 2022-07-05 NOTE — PROGRESS NOTE ADULT - ASSESSMENT
50 yo male with history of diabetes,  A. fib, depression, hypotension, HLD, hypothyroid initially presented to [facility] [place] with complaints of pain in his left hip. Patient reports that he was trying to get out the pool and then he misplaced his foot and fell down on to his left hip. Patient complaints of pain at hip site. No other complaints. Of note, patient had a hip fracture of his left hip in October 2021 and was repaired at [facility] [place]. Patient was transferred to [facility] for evaluation. Orthopedist requested admission to medicine for medical management.    #Left hip fracture s/p ORIF, fracture nonunion, femur, with graft application with Intramedullary nail fixation of left femur  Ortho following   Pain control  Endocrine consulted for bone health assessment - pt refused eval - will need outpt work up   Patients previous hip fracture did not heal properly     #DM - uncontrolled   ct Lantus 17 units BID  ct Lispro  7 units when eating  ISS, monitor BS   A1C -9    #Hyponatremia  Likely pseudohyponatremia given Elevated glucose  Monitor     #CKD3  Monitor Renal function    #A. fib  S/p ablation  C/w amiodarone  Not on AC    #Hypothyroid  C/w Synthroid    #HLD  Atorvastatin        DVT prophylaxis: Lovenox x 4 weeks starting tomorrow per orth, PT eval

## 2022-07-05 NOTE — CHART NOTE - NSCHARTNOTEFT_GEN_A_CORE
Orthopaedic Trauma Surgeon Addendum:    I have personally performed a face-to-face diagnostic evaluation on this patient.  I have reviewed the physician assistant note and agree with the history, exam, and plan of care, except as noted.    To OR today.    Gary Kay MD  Orthopaedic Trauma Surgeon  [facility] [place]

## 2022-07-05 NOTE — DISCHARGE NOTE PROVIDER - CARE PROVIDER_API CALL
Gary Kay)  Orthopaedic Surgery  [address] [place] [address]  Phone: [phone]  Fax: [phone]  Follow Up Time:

## 2022-07-05 NOTE — BRIEF OPERATIVE NOTE - NSICDXBRIEFPROCEDURE_GEN_ALL_CORE_FT
PROCEDURES:  ORIF, fracture nonunion, femur, with graft application 05-Jul-2022 12:36:13  Gary Kay  Intramedullary nail fixation of left femur 05-Jul-2022 12:36:34  Gary Kay

## 2022-07-05 NOTE — BRIEF OPERATIVE NOTE - OPERATION/FINDINGS
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 New fracture left femoral shaft    implants: Synthes TFN , 11x420 mm, 2 distal, helical blade with PMMA augment

## 2022-07-05 NOTE — DISCHARGE NOTE PROVIDER - NSDCMRMEDTOKEN_GEN_ALL_CORE_FT
amiodarone 200 mg oral tablet: 1 tab(s) orally once a day  aspirin 81 mg oral delayed release tablet: 1 tab(s) orally once a day  Basaglar KwikPen 100 units/mL subcutaneous solution: 17 to 20 unit(s) subcutaneous 2 times a day  Crestor 5 mg oral tablet: 1 tab(s) orally every 48 hours  desvenlafaxine (as base) 100 mg oral tablet, extended release: 1 tab(s) orally once a day  ferrous sulfate 325 mg (65 mg elemental iron) oral tablet: 1 tab(s) orally 3 times a day  folic acid 1 mg oral tablet: 1 tab(s) orally once a day  levothyroxine 50 mcg (0.05 mg) oral tablet: 1 tab(s) orally once a day  midodrine 10 mg oral tablet: 1 tab(s) orally 3 times a day  NovoLOG 100 units/mL subcutaneous solution: 7 to 8 unit(s) subcutaneous 3 times a day (with meals)  Vitamin B12 1000 mcg oral tablet: 1 tab(s) orally once a day  Vitamin D3 125 mcg (5000 intl units) oral capsule: 1 cap(s) orally once a day   amiodarone 200 mg oral tablet: 1 tab(s) orally once a day  aspirin 81 mg oral delayed release tablet: 1 tab(s) orally once a day  atorvastatin 20 mg oral tablet: 1 tab(s) orally once a day (at bedtime)  Basaglar KwikPen 100 units/mL subcutaneous solution: 17 to 20 unit(s) subcutaneous 2 times a day  bisacodyl 10 mg rectal suppository: 1 suppository(ies) rectal once a day, As needed, If no bowel movement  Crestor 5 mg oral tablet: 1 tab(s) orally every 48 hours  desvenlafaxine (as base) 100 mg oral tablet, extended release: 1 tab(s) orally once a day  enoxaparin: 40 milligram(s) subcutaneous once a day  ferrous sulfate 325 mg (65 mg elemental iron) oral tablet: 1 tab(s) orally 3 times a day  folic acid 1 mg oral tablet: 1 tab(s) orally once a day  levothyroxine 50 mcg (0.05 mg) oral tablet: 1 tab(s) orally once a day  magnesium hydroxide 8% oral suspension: 30 milliliter(s) orally once a day, As needed, Constipation  midodrine 5 mg oral tablet: 3 tab(s) orally every 8 hours  NovoLOG 100 units/mL subcutaneous solution: 7 to 8 unit(s) subcutaneous 3 times a day (with meals)  Vitamin B12 1000 mcg oral tablet: 1 tab(s) orally once a day  Vitamin D3 125 mcg (5000 intl units) oral capsule: 1 cap(s) orally once a day   amiodarone 200 mg oral tablet: 1 tab(s) orally once a day  aspirin 81 mg oral delayed release tablet: 1 tab(s) orally once a day  atorvastatin 20 mg oral tablet: 1 tab(s) orally once a day (at bedtime)  Basaglar KwikPen 100 units/mL subcutaneous solution: 17 to 20 unit(s) subcutaneous 2 times a day  bisacodyl 10 mg rectal suppository: 1 suppository(ies) rectal once a day, As needed, If no bowel movement  Crestor 5 mg oral tablet: 1 tab(s) orally every 48 hours  desvenlafaxine (as base) 100 mg oral tablet, extended release: 1 tab(s) orally once a day  enoxaparin: 40 milligram(s) subcutaneous once a day  ferrous sulfate 325 mg (65 mg elemental iron) oral tablet: 1 tab(s) orally 3 times a day  folic acid 1 mg oral tablet: 1 tab(s) orally once a day  levothyroxine 75 mcg (0.075 mg) oral tablet: 1 tab(s) orally once a day  magnesium hydroxide 8% oral suspension: 30 milliliter(s) orally once a day, As needed, Constipation  midodrine 5 mg oral tablet: 3 tab(s) orally every 8 hours  NovoLOG 100 units/mL subcutaneous solution: 7 to 8 unit(s) subcutaneous 3 times a day (with meals)  oxycodone-acetaminophen 7.5 mg-325 mg oral tablet: 1 tab(s) orally 2 times a day MDD:4  Vitamin B12 1000 mcg oral tablet: 1 tab(s) orally once a day  Vitamin D3 125 mcg (5000 intl units) oral capsule: 1 cap(s) orally once a day   amiodarone 200 mg oral tablet: 1 tab(s) orally once a day  aspirin 81 mg oral delayed release tablet: 1 tab(s) orally once a day  Basaglar KwikPen 100 units/mL subcutaneous solution: 17 to 20 unit(s) subcutaneous 2 times a day  Crestor 5 mg oral tablet: 1 tab(s) orally every 48 hours  desvenlafaxine (as base) 100 mg oral tablet, extended release: 1 tab(s) orally once a day  ferrous sulfate 325 mg (65 mg elemental iron) oral tablet: 1 tab(s) orally 3 times a day  folic acid 1 mg oral tablet: 1 tab(s) orally once a day  levothyroxine 75 mcg (0.075 mg) oral tablet: 1 tab(s) orally once a day  Lovenox 40 mg/0.4 mL injectable solution: 40 milligram(s) subcutaneously once a day   midodrine 5 mg oral tablet: 3 tab(s) orally every 8 hours  NovoLOG 100 units/mL subcutaneous solution: 7 to 8 unit(s) subcutaneous 3 times a day (with meals)  Vitamin B12 1000 mcg oral tablet: 1 tab(s) orally once a day  Vitamin D3 125 mcg (5000 intl units) oral capsule: 1 cap(s) orally once a day

## 2022-07-05 NOTE — DISCHARGE NOTE PROVIDER - NSDCCPCAREPLAN_GEN_ALL_CORE_FT
PRINCIPAL DISCHARGE DIAGNOSIS  Diagnosis: Hip fracture, left  Assessment and Plan of Treatment:       SECONDARY DISCHARGE DIAGNOSES  Diagnosis: Type 2 diabetes mellitus  Assessment and Plan of Treatment:     Diagnosis: Uncontrolled type 2 DM with hyperosmolar nonketotic hyperglycemia  Assessment and Plan of Treatment:     Diagnosis: Orthostatic hypotension  Assessment and Plan of Treatment:     Diagnosis: Chronic orthostatic hypotension  Assessment and Plan of Treatment:     Diagnosis: Hyponatremia  Assessment and Plan of Treatment:     Diagnosis: Stage 3 chronic kidney disease  Assessment and Plan of Treatment:

## 2022-07-05 NOTE — DISCHARGE NOTE PROVIDER - DETAILS OF MALNUTRITION DIAGNOSIS/DIAGNOSES
This patient has been assessed with a concern for Malnutrition and was treated during this hospitalization for the following Nutrition diagnosis/diagnoses:     -  07/12/2022: Moderate protein-calorie malnutrition

## 2022-07-05 NOTE — BRIEF OPERATIVE NOTE - COMMENTS
post-op plan: WBAT, PT/OT, ancef per protocol, contralateral SCD, LMWH (or equivalent) x 4 weeks post-op, f/u cultures, vac 5-7 days, staples 2 weeks

## 2022-07-06 LAB
ANION GAP SERPL CALC-SCNC: 13 MMOL/L — SIGNIFICANT CHANGE UP (ref 5–17)
ANISOCYTOSIS BLD QL: SLIGHT — SIGNIFICANT CHANGE UP
BASOPHILS # BLD AUTO: 0 K/UL — SIGNIFICANT CHANGE UP (ref 0–0.2)
BASOPHILS NFR BLD AUTO: 0 % — SIGNIFICANT CHANGE UP (ref 0–2)
BUN SERPL-MCNC: 34.3 MG/DL — HIGH (ref 8–20)
CALCIUM SERPL-MCNC: 8.9 MG/DL — SIGNIFICANT CHANGE UP (ref 8.6–10.2)
CHLORIDE SERPL-SCNC: 95 MMOL/L — LOW (ref 98–107)
CO2 SERPL-SCNC: 22 MMOL/L — SIGNIFICANT CHANGE UP (ref 22–29)
CREAT SERPL-MCNC: 1.86 MG/DL — HIGH (ref 0.5–1.3)
EGFR: 43 ML/MIN/1.73M2 — LOW
EOSINOPHIL # BLD AUTO: 0 K/UL — SIGNIFICANT CHANGE UP (ref 0–0.5)
EOSINOPHIL NFR BLD AUTO: 0 % — SIGNIFICANT CHANGE UP (ref 0–6)
GIANT PLATELETS BLD QL SMEAR: PRESENT — SIGNIFICANT CHANGE UP
GLUCOSE BLDC GLUCOMTR-MCNC: 173 MG/DL — HIGH (ref 70–99)
GLUCOSE BLDC GLUCOMTR-MCNC: 252 MG/DL — HIGH (ref 70–99)
GLUCOSE BLDC GLUCOMTR-MCNC: 320 MG/DL — HIGH (ref 70–99)
GLUCOSE BLDC GLUCOMTR-MCNC: 369 MG/DL — HIGH (ref 70–99)
GLUCOSE BLDC GLUCOMTR-MCNC: 386 MG/DL — HIGH (ref 70–99)
GLUCOSE SERPL-MCNC: 347 MG/DL — HIGH (ref 70–99)
HCT VFR BLD CALC: 30.2 % — LOW (ref 39–50)
HGB BLD-MCNC: 10.3 G/DL — LOW (ref 13–17)
LYMPHOCYTES # BLD AUTO: 0.47 K/UL — LOW (ref 1–3.3)
LYMPHOCYTES # BLD AUTO: 2.6 % — LOW (ref 13–44)
MACROCYTES BLD QL: SLIGHT — SIGNIFICANT CHANGE UP
MANUAL SMEAR VERIFICATION: SIGNIFICANT CHANGE UP
MCHC RBC-ENTMCNC: 31.6 PG — SIGNIFICANT CHANGE UP (ref 27–34)
MCHC RBC-ENTMCNC: 34.1 GM/DL — SIGNIFICANT CHANGE UP (ref 32–36)
MCV RBC AUTO: 92.6 FL — SIGNIFICANT CHANGE UP (ref 80–100)
MICROCYTES BLD QL: SLIGHT — SIGNIFICANT CHANGE UP
MONOCYTES # BLD AUTO: 0.63 K/UL — SIGNIFICANT CHANGE UP (ref 0–0.9)
MONOCYTES NFR BLD AUTO: 3.5 % — SIGNIFICANT CHANGE UP (ref 2–14)
NEUTROPHILS # BLD AUTO: 16.86 K/UL — HIGH (ref 1.8–7.4)
NEUTROPHILS NFR BLD AUTO: 90.4 % — HIGH (ref 43–77)
NEUTS BAND # BLD: 3.5 % — SIGNIFICANT CHANGE UP (ref 0–8)
PLAT MORPH BLD: NORMAL — SIGNIFICANT CHANGE UP
PLATELET # BLD AUTO: 244 K/UL — SIGNIFICANT CHANGE UP (ref 150–400)
POLYCHROMASIA BLD QL SMEAR: SLIGHT — SIGNIFICANT CHANGE UP
POTASSIUM SERPL-MCNC: 5.4 MMOL/L — HIGH (ref 3.5–5.3)
POTASSIUM SERPL-SCNC: 5.4 MMOL/L — HIGH (ref 3.5–5.3)
RBC # BLD: 3.26 M/UL — LOW (ref 4.2–5.8)
RBC # FLD: 13 % — SIGNIFICANT CHANGE UP (ref 10.3–14.5)
RBC BLD AUTO: ABNORMAL
SODIUM SERPL-SCNC: 130 MMOL/L — LOW (ref 135–145)
WBC # BLD: 17.95 K/UL — HIGH (ref 3.8–10.5)
WBC # FLD AUTO: 17.95 K/UL — HIGH (ref 3.8–10.5)

## 2022-07-06 PROCEDURE — 99232 SBSQ HOSP IP/OBS MODERATE 35: CPT

## 2022-07-06 RX ORDER — INSULIN LISPRO 100/ML
VIAL (ML) SUBCUTANEOUS
Refills: 0 | Status: DISCONTINUED | OUTPATIENT
Start: 2022-07-06 | End: 2022-07-20

## 2022-07-06 RX ORDER — SODIUM ZIRCONIUM CYCLOSILICATE 10 G/10G
10 POWDER, FOR SUSPENSION ORAL ONCE
Refills: 0 | Status: COMPLETED | OUTPATIENT
Start: 2022-07-06 | End: 2022-07-06

## 2022-07-06 RX ORDER — INSULIN LISPRO 100/ML
10 VIAL (ML) SUBCUTANEOUS
Refills: 0 | Status: DISCONTINUED | OUTPATIENT
Start: 2022-07-06 | End: 2022-07-07

## 2022-07-06 RX ORDER — ENOXAPARIN SODIUM 100 MG/ML
40 INJECTION SUBCUTANEOUS EVERY 24 HOURS
Refills: 0 | Status: DISCONTINUED | OUTPATIENT
Start: 2022-07-06 | End: 2022-07-20

## 2022-07-06 RX ADMIN — INSULIN GLARGINE 17 UNIT(S): 100 INJECTION, SOLUTION SUBCUTANEOUS at 10:26

## 2022-07-06 RX ADMIN — INSULIN GLARGINE 17 UNIT(S): 100 INJECTION, SOLUTION SUBCUTANEOUS at 23:10

## 2022-07-06 RX ADMIN — Medication 2: at 23:11

## 2022-07-06 RX ADMIN — HYDROMORPHONE HYDROCHLORIDE 4 MILLIGRAM(S): 2 INJECTION INTRAMUSCULAR; INTRAVENOUS; SUBCUTANEOUS at 18:39

## 2022-07-06 RX ADMIN — HYDROMORPHONE HYDROCHLORIDE 0.5 MILLIGRAM(S): 2 INJECTION INTRAMUSCULAR; INTRAVENOUS; SUBCUTANEOUS at 20:19

## 2022-07-06 RX ADMIN — Medication 100 MILLIGRAM(S): at 17:12

## 2022-07-06 RX ADMIN — ENOXAPARIN SODIUM 40 MILLIGRAM(S): 100 INJECTION SUBCUTANEOUS at 13:55

## 2022-07-06 RX ADMIN — HYDROMORPHONE HYDROCHLORIDE 0.5 MILLIGRAM(S): 2 INJECTION INTRAMUSCULAR; INTRAVENOUS; SUBCUTANEOUS at 20:50

## 2022-07-06 RX ADMIN — Medication 4: at 14:30

## 2022-07-06 RX ADMIN — SODIUM ZIRCONIUM CYCLOSILICATE 10 GRAM(S): 10 POWDER, FOR SUSPENSION ORAL at 13:56

## 2022-07-06 RX ADMIN — OXYCODONE HYDROCHLORIDE 10 MILLIGRAM(S): 5 TABLET ORAL at 23:12

## 2022-07-06 RX ADMIN — Medication 5: at 08:51

## 2022-07-06 RX ADMIN — HYDROMORPHONE HYDROCHLORIDE 4 MILLIGRAM(S): 2 INJECTION INTRAMUSCULAR; INTRAVENOUS; SUBCUTANEOUS at 03:45

## 2022-07-06 RX ADMIN — Medication 7 UNIT(S): at 14:29

## 2022-07-06 RX ADMIN — HYDROMORPHONE HYDROCHLORIDE 4 MILLIGRAM(S): 2 INJECTION INTRAMUSCULAR; INTRAVENOUS; SUBCUTANEOUS at 17:39

## 2022-07-06 RX ADMIN — Medication 7 UNIT(S): at 08:51

## 2022-07-06 RX ADMIN — HYDROMORPHONE HYDROCHLORIDE 4 MILLIGRAM(S): 2 INJECTION INTRAMUSCULAR; INTRAVENOUS; SUBCUTANEOUS at 09:49

## 2022-07-06 RX ADMIN — HYDROMORPHONE HYDROCHLORIDE 4 MILLIGRAM(S): 2 INJECTION INTRAMUSCULAR; INTRAVENOUS; SUBCUTANEOUS at 04:15

## 2022-07-06 RX ADMIN — Medication 10 UNIT(S): at 18:19

## 2022-07-06 RX ADMIN — HYDROMORPHONE HYDROCHLORIDE 4 MILLIGRAM(S): 2 INJECTION INTRAMUSCULAR; INTRAVENOUS; SUBCUTANEOUS at 08:49

## 2022-07-06 NOTE — PROGRESS NOTE ADULT - SUBJECTIVE AND OBJECTIVE BOX
Patient is a 51y old  Male who presents with a chief complaint of Left hip Fracture (06 Jul 2022 07:31)      INTERVAL History of Present Illness/OVERNIGHT EVENTS: elevated glucose  atypical diabetes/insulin rx at home  declines inpatient ENDO consult    MEDICATIONS  (STANDING):  ceFAZolin   IVPB 2000 milliGRAM(s) IV Intermittent once  dextrose 5%. 1000 milliLiter(s) (50 mL/Hr) IV Continuous <Continuous>  dextrose 5%. 1000 milliLiter(s) (100 mL/Hr) IV Continuous <Continuous>  dextrose 50% Injectable 25 Gram(s) IV Push once  dextrose 50% Injectable 12.5 Gram(s) IV Push once  dextrose 50% Injectable 25 Gram(s) IV Push once  enoxaparin Injectable 40 milliGRAM(s) SubCutaneous every 24 hours  glucagon  Injectable 1 milliGRAM(s) IntraMuscular once  insulin glargine Injectable (LANTUS) 17 Unit(s) SubCutaneous every morning  insulin glargine Injectable (LANTUS) 17 Unit(s) SubCutaneous at bedtime    MEDICATIONS  (PRN):  dextrose Oral Gel 15 Gram(s) Oral once PRN Blood Glucose LESS THAN 70 milliGRAM(s)/deciliter  HYDROmorphone   Tablet 4 milliGRAM(s) Oral every 4 hours PRN Severe Pain (7 - 10)  HYDROmorphone  Injectable 0.5 milliGRAM(s) IV Push every 3 hours PRN Breakthrough  magnesium hydroxide Suspension 30 milliLiter(s) Oral daily PRN Constipation  ondansetron Injectable 4 milliGRAM(s) IV Push every 6 hours PRN Nausea and/or Vomiting  oxyCODONE    IR 10 milliGRAM(s) Oral every 3 hours PRN Moderate Pain (4 - 6)  oxyCODONE    IR 5 milliGRAM(s) Oral every 3 hours PRN Mild Pain (1 - 3)      Allergies    Allergy Status Unknown    Intolerances        REVIEW OF SYSTEMS:  Negative unless otherwise specified above.    Vital Signs Last 24 Hrs  T(C): 36.5 (06 Jul 2022 14:47), Max: 37.1 (06 Jul 2022 09:14)  T(F): 97.7 (06 Jul 2022 14:47), Max: 98.7 (06 Jul 2022 09:14)  HR: 90 (06 Jul 2022 14:47) (83 - 90)  BP: 109/99 (06 Jul 2022 14:47) (107/70 - 150/91)  BP(mean): --  RR: 17 (06 Jul 2022 14:47) (17 - 18)  SpO2: 98% (06 Jul 2022 14:47) (94% - 98%)        PHYSICAL EXAM:  GENERAL: No apparent distress, appears stated age  HEAD:  Atraumatic, Normocephalic  EYES: Conjunctiva and sclera clear, no discharge  ENMT: Moist mucous membranes, no nasal discharge  NECK: Supple, no JVD  CHEST/LUNG: Clear to auscultation bilaterally, no wheeze or rales  HEART: Regular rhythm, no rubs or gallops  ABDOMEN: Soft, Nontender, Nondistended  EXTREMITIES:  surgical dressing left hip C/D/I  SKIN: No rash, no new discoloration  NERVOUS SYSTEM:  Alert & Oriented; Bilateral Lower extremity mobile, sensation to light touch intact      LABS:                        10.3   17.95 )-----------( 244      ( 06 Jul 2022 06:37 )             30.2     06 Jul 2022 06:37    130    |  95     |  34.3   ----------------------------<  347    5.4     |  22.0   |  1.86     Ca    8.9        06 Jul 2022 06:37          CAPILLARY BLOOD GLUCOSE      POCT Blood Glucose.: 320 mg/dL (06 Jul 2022 13:59)  POCT Blood Glucose.: 369 mg/dL (06 Jul 2022 10:26)  POCT Blood Glucose.: 386 mg/dL (06 Jul 2022 08:35)  POCT Blood Glucose.: 437 mg/dL (05 Jul 2022 21:57)      RADIOLOGY & ADDITIONAL TESTS:      Images reviewed personally    Consultant Notes Reviewed and Care Discussed with relevant Consultants.

## 2022-07-06 NOTE — PROGRESS NOTE ADULT - SUBJECTIVE AND OBJECTIVE BOX
Patient was seen and examined at approximately 7;00am    ORTHO-TRAUMA SERVICE      Pt Name: ELZBIETA CÁRDENAS    MRN: [id number]      Procedure: Intramedulary nail of left femur  Surgeon: Eliza PIÑA  DOS: 7/5/22    Patient seen and examined bedside. Reports pain controlled with current regimen. Denies acute motor sensory changes. Endorses improvement of pain since surgery. Otherwise doing well.    PAST MEDICAL & SURGICAL HISTORY:  PAST MEDICAL & SURGICAL HISTORY:  DM (diabetes mellitus)      History of left hip replacement        Allergies: Allergy Status Unknown      Medications: ceFAZolin   IVPB 2000 milliGRAM(s) IV Intermittent once  dextrose 5%. 1000 milliLiter(s) IV Continuous <Continuous>  dextrose 5%. 1000 milliLiter(s) IV Continuous <Continuous>  dextrose 50% Injectable 25 Gram(s) IV Push once  dextrose 50% Injectable 12.5 Gram(s) IV Push once  dextrose 50% Injectable 25 Gram(s) IV Push once  dextrose Oral Gel 15 Gram(s) Oral once PRN  glucagon  Injectable 1 milliGRAM(s) IntraMuscular once  HYDROmorphone   Tablet 4 milliGRAM(s) Oral every 4 hours PRN  HYDROmorphone  Injectable 0.5 milliGRAM(s) IV Push every 3 hours PRN  insulin glargine Injectable (LANTUS) 17 Unit(s) SubCutaneous every morning  insulin glargine Injectable (LANTUS) 17 Unit(s) SubCutaneous at bedtime  insulin lispro (ADMELOG) corrective regimen sliding scale   SubCutaneous at bedtime  insulin lispro (ADMELOG) corrective regimen sliding scale   SubCutaneous three times a day before meals  insulin lispro Injectable (ADMELOG) 7 Unit(s) SubCutaneous three times a day before meals  magnesium hydroxide Suspension 30 milliLiter(s) Oral daily PRN  ondansetron Injectable 4 milliGRAM(s) IV Push every 6 hours PRN  oxyCODONE    IR 10 milliGRAM(s) Oral every 3 hours PRN  oxyCODONE    IR 5 milliGRAM(s) Oral every 3 hours PRN                              10.3   17.95 )-----------( 244      ( 06 Jul 2022 06:37 )             30.2     07-06    130<L>  |  95<L>  |  34.3<H>  ----------------------------<  347<H>  5.4<H>   |  22.0  |  1.86<H>    Ca    8.9      06 Jul 2022 06:37        PHYSICAL EXAM:    Vital Signs Last 24 Hrs  T(C): 36.6 (06 Jul 2022 04:07), Max: 37.5 (05 Jul 2022 07:41)  T(F): 97.8 (06 Jul 2022 04:07), Max: 99.5 (05 Jul 2022 07:41)  HR: 86 (06 Jul 2022 04:07) (74 - 93)  BP: 107/70 (06 Jul 2022 04:07) (107/70 - 158/89)  BP(mean): 89 (05 Jul 2022 15:00) (89 - 89)  RR: 18 (06 Jul 2022 04:07) (14 - 18)  SpO2: 98% (06 Jul 2022 04:07) (94% - 100%)  Daily Height in cm: 190 (05 Jul 2022 07:41)    Daily     Appearance: Alert, responsive, in no acute distress.    Neurological: Sensation is grossly intact to light touch. No focal deficits or weaknesses found.    Skin: no rash on visible skin. Skin is clean, dry and intact. No bleeding. No abrasions. No ulcerations.    Vascular: 2+ distal pulses. Cap refill < 2 sec. No signs of venous  insufficiency   or stasis. No extremity ulcerations. No cyanosis.      LLE:     Dressings C/D/I. No bleeding. No erythema. Prevena vac dressing in place, functioning properly, tube patent.     Visible Skin: No erythema. No wound dehiscence    Pulses: 2+ dorsalis pedis pulse. Cap refill < 2 sec.    Sensation: Grossly intact to light touch without deficit.    Motor: +EHL/FHL/AT/GC        A/P: 51y Male  POD# 1 s/p left femur IMN    - ortho Stable  - Pain Control  - DVT ppx Lovenox or equivalent needed to be ordered  - PT eval  - Weight bearing status: WBAT  - continue rest of care as per primary team

## 2022-07-06 NOTE — OCCUPATIONAL THERAPY INITIAL EVALUATION ADULT - FINE MOTOR COORDINATION, RIGHT HAND, MANIPULATE OBJECTS, OT EVAL
Pt states that Dr Yesenia Merritt was supposed to prescribe a muscle relaxer for her, but it is still not at the pharmacy.  Please advise
normal performance

## 2022-07-06 NOTE — PHYSICAL THERAPY INITIAL EVALUATION ADULT - PERTINENT HX OF CURRENT PROBLEM, REHAB EVAL
52 yo male with history of diabetes,  A. fib, depression, hypotension, HLD, hypothyroid initially presented to [facility] with complaints of pain in his left hip. Patient reports that he was trying to get out the pool and then he misplaced his foot and fell down on to his left hip; now s/p IMN left femur fracture

## 2022-07-06 NOTE — OCCUPATIONAL THERAPY INITIAL EVALUATION ADULT - SOCIAL CONCERNS
hx of depression and anxious t/o eval, reports has  anxiety/Complex psychosocial needs/coping issues

## 2022-07-06 NOTE — OCCUPATIONAL THERAPY INITIAL EVALUATION ADULT - IMPAIRED TRANSFERS: SIT/STAND, REHAB EVAL
pt became dizzy and anxious in standing, reporting unable to attempt to take steps due to dizziness and fear of falling despite encouragement/impaired balance/pain/decreased strength

## 2022-07-06 NOTE — OCCUPATIONAL THERAPY INITIAL EVALUATION ADULT - PERTINENT HX OF CURRENT PROBLEM, REHAB EVAL
50 yo male with history of diabetes,  A. fib, depression, hypotension, HLD, hypothyroid initially presented to [facility] with complaints of pain in his left hip. Patient reports that he was trying to get out the pool and then he misplaced his foot and fell down on to his left hip; now s/p IMN left femur fracture

## 2022-07-06 NOTE — OCCUPATIONAL THERAPY INITIAL EVALUATION ADULT - ADDITIONAL COMMENTS
as per pt lives with  in a private home, independent prior and spouse is able to assist however works outside the home

## 2022-07-06 NOTE — PHYSICAL THERAPY INITIAL EVALUATION ADULT - ADDITIONAL COMMENTS
Pt reports independent PTA, (previously using [facility] after first hip fracture). Independent ADLs., +. Spouse can assist if needed. Pt has discharged to [facility] for acute rehab in past and is requesting same.

## 2022-07-06 NOTE — OCCUPATIONAL THERAPY INITIAL EVALUATION ADULT - LOWER BODY DRESSING, ASSISTIVE DEVICE, OT EVAL
pt may benefit from use of hip kit/dressing stick/elastic laces/long-handled shoe horn/reacher/sock-aid

## 2022-07-06 NOTE — PROGRESS NOTE ADULT - ASSESSMENT
52 yo male with history of diabetes,  A. fib, depression, hypotension, HLD, hypothyroid [place] [facility] [place] of [place] in his left hip. Patient reports that he was trying to get out the pool and then he misplaced his foot and fell down on to his left hip. Patient complaints of pain at hip site. No other complaints. Of note, patient had a hip fracture of his left hip in October 2021 and was repaired at [facility]. Patient was transferred to [facility] for evaluation. Orthopedist requested admission to medicine for medical management.    #Left hip fracture s/p ORIF, fracture nonunion, femur, with graft application with Intramedullary nail fixation of left femur  Ortho following   Pain control  Endocrine consulted for bone health assessment - pt refused eval - will need outpt work up   Patients previous hip fracture did not heal properly     #DM - uncontrolled   ct Lantus 17 units BID  ct Lispro  7 units when eating - raised to 10  SSI raised to moderate  consider higher Lantus dose  ISS, monitor BS   A1C -9    #Hyponatremia  Likely pseudohyponatremia given Elevated glucose  Monitor BMP    #CKD3/hyperkalemia  Monitor Renal function  Lokelma  avoid nephrotoxics. monitor intake and output.     #A. fib  S/p ablation  C/w amiodarone  Not on AC    #Hypothyroid  C/w Synthroid    #HLD  Atorvastatin        DVT prophylaxis: Lovenox x 4 weeks starting tomorrow per tahir PT eval     Sub-optimal self care and insight

## 2022-07-07 LAB
ALBUMIN SERPL ELPH-MCNC: 2.8 G/DL — LOW (ref 3.3–5.2)
ALP SERPL-CCNC: 193 U/L — HIGH (ref 40–120)
ALT FLD-CCNC: 10 U/L — SIGNIFICANT CHANGE UP
ANION GAP SERPL CALC-SCNC: 13 MMOL/L — SIGNIFICANT CHANGE UP (ref 5–17)
AST SERPL-CCNC: 17 U/L — SIGNIFICANT CHANGE UP
BASOPHILS # BLD AUTO: 0.03 K/UL — SIGNIFICANT CHANGE UP (ref 0–0.2)
BASOPHILS NFR BLD AUTO: 0.3 % — SIGNIFICANT CHANGE UP (ref 0–2)
BILIRUB SERPL-MCNC: 0.6 MG/DL — SIGNIFICANT CHANGE UP (ref 0.4–2)
BLD GP AB SCN SERPL QL: SIGNIFICANT CHANGE UP
BUN SERPL-MCNC: 35.3 MG/DL — HIGH (ref 8–20)
CALCIUM SERPL-MCNC: 8.4 MG/DL — LOW (ref 8.6–10.2)
CHLORIDE SERPL-SCNC: 92 MMOL/L — LOW (ref 98–107)
CO2 SERPL-SCNC: 24 MMOL/L — SIGNIFICANT CHANGE UP (ref 22–29)
CREAT SERPL-MCNC: 1.85 MG/DL — HIGH (ref 0.5–1.3)
EGFR: 43 ML/MIN/1.73M2 — LOW
EOSINOPHIL # BLD AUTO: 0.07 K/UL — SIGNIFICANT CHANGE UP (ref 0–0.5)
EOSINOPHIL NFR BLD AUTO: 0.6 % — SIGNIFICANT CHANGE UP (ref 0–6)
GLUCOSE BLDC GLUCOMTR-MCNC: 138 MG/DL — HIGH (ref 70–99)
GLUCOSE BLDC GLUCOMTR-MCNC: 229 MG/DL — HIGH (ref 70–99)
GLUCOSE BLDC GLUCOMTR-MCNC: 281 MG/DL — HIGH (ref 70–99)
GLUCOSE BLDC GLUCOMTR-MCNC: 282 MG/DL — HIGH (ref 70–99)
GLUCOSE BLDC GLUCOMTR-MCNC: 74 MG/DL — SIGNIFICANT CHANGE UP (ref 70–99)
GLUCOSE BLDC GLUCOMTR-MCNC: 88 MG/DL — SIGNIFICANT CHANGE UP (ref 70–99)
GLUCOSE SERPL-MCNC: 219 MG/DL — HIGH (ref 70–99)
HCT VFR BLD CALC: 25.2 % — LOW (ref 39–50)
HCT VFR BLD CALC: 26.6 % — LOW (ref 39–50)
HGB BLD-MCNC: 8.4 G/DL — LOW (ref 13–17)
HGB BLD-MCNC: 8.4 G/DL — LOW (ref 13–17)
IMM GRANULOCYTES NFR BLD AUTO: 0.8 % — SIGNIFICANT CHANGE UP (ref 0–1.5)
LYMPHOCYTES # BLD AUTO: 1.53 K/UL — SIGNIFICANT CHANGE UP (ref 1–3.3)
LYMPHOCYTES # BLD AUTO: 13.8 % — SIGNIFICANT CHANGE UP (ref 13–44)
MCHC RBC-ENTMCNC: 29.8 PG — SIGNIFICANT CHANGE UP (ref 27–34)
MCHC RBC-ENTMCNC: 30.9 PG — SIGNIFICANT CHANGE UP (ref 27–34)
MCHC RBC-ENTMCNC: 31.6 GM/DL — LOW (ref 32–36)
MCHC RBC-ENTMCNC: 33.3 GM/DL — SIGNIFICANT CHANGE UP (ref 32–36)
MCV RBC AUTO: 92.6 FL — SIGNIFICANT CHANGE UP (ref 80–100)
MCV RBC AUTO: 94.3 FL — SIGNIFICANT CHANGE UP (ref 80–100)
MONOCYTES # BLD AUTO: 1.19 K/UL — HIGH (ref 0–0.9)
MONOCYTES NFR BLD AUTO: 10.7 % — SIGNIFICANT CHANGE UP (ref 2–14)
NEUTROPHILS # BLD AUTO: 8.16 K/UL — HIGH (ref 1.8–7.4)
NEUTROPHILS NFR BLD AUTO: 73.8 % — SIGNIFICANT CHANGE UP (ref 43–77)
PLATELET # BLD AUTO: 181 K/UL — SIGNIFICANT CHANGE UP (ref 150–400)
PLATELET # BLD AUTO: 216 K/UL — SIGNIFICANT CHANGE UP (ref 150–400)
POTASSIUM SERPL-MCNC: 4.2 MMOL/L — SIGNIFICANT CHANGE UP (ref 3.5–5.3)
POTASSIUM SERPL-SCNC: 4.2 MMOL/L — SIGNIFICANT CHANGE UP (ref 3.5–5.3)
PROCALCITONIN SERPL-MCNC: 0.81 NG/ML — HIGH (ref 0.02–0.1)
PROT SERPL-MCNC: 5.6 G/DL — LOW (ref 6.6–8.7)
RBC # BLD: 2.72 M/UL — LOW (ref 4.2–5.8)
RBC # BLD: 2.82 M/UL — LOW (ref 4.2–5.8)
RBC # FLD: 12.9 % — SIGNIFICANT CHANGE UP (ref 10.3–14.5)
RBC # FLD: 13 % — SIGNIFICANT CHANGE UP (ref 10.3–14.5)
SODIUM SERPL-SCNC: 129 MMOL/L — LOW (ref 135–145)
WBC # BLD: 11.07 K/UL — HIGH (ref 3.8–10.5)
WBC # BLD: 13.57 K/UL — HIGH (ref 3.8–10.5)
WBC # FLD AUTO: 11.07 K/UL — HIGH (ref 3.8–10.5)
WBC # FLD AUTO: 13.57 K/UL — HIGH (ref 3.8–10.5)

## 2022-07-07 PROCEDURE — 99233 SBSQ HOSP IP/OBS HIGH 50: CPT

## 2022-07-07 PROCEDURE — 99222 1ST HOSP IP/OBS MODERATE 55: CPT

## 2022-07-07 RX ORDER — FOLIC ACID 0.8 MG
1 TABLET ORAL DAILY
Refills: 0 | Status: DISCONTINUED | OUTPATIENT
Start: 2022-07-07 | End: 2022-07-20

## 2022-07-07 RX ORDER — LEVOTHYROXINE SODIUM 125 MCG
50 TABLET ORAL DAILY
Refills: 0 | Status: DISCONTINUED | OUTPATIENT
Start: 2022-07-07 | End: 2022-07-15

## 2022-07-07 RX ORDER — INSULIN GLARGINE 100 [IU]/ML
20 INJECTION, SOLUTION SUBCUTANEOUS AT BEDTIME
Refills: 0 | Status: DISCONTINUED | OUTPATIENT
Start: 2022-07-07 | End: 2022-07-07

## 2022-07-07 RX ORDER — INSULIN GLARGINE 100 [IU]/ML
5 INJECTION, SOLUTION SUBCUTANEOUS ONCE
Refills: 0 | Status: COMPLETED | OUTPATIENT
Start: 2022-07-07 | End: 2022-07-07

## 2022-07-07 RX ORDER — PREGABALIN 225 MG/1
1000 CAPSULE ORAL DAILY
Refills: 0 | Status: DISCONTINUED | OUTPATIENT
Start: 2022-07-07 | End: 2022-07-20

## 2022-07-07 RX ORDER — FERROUS SULFATE 325(65) MG
325 TABLET ORAL
Refills: 0 | Status: DISCONTINUED | OUTPATIENT
Start: 2022-07-07 | End: 2022-07-15

## 2022-07-07 RX ORDER — ASPIRIN/CALCIUM CARB/MAGNESIUM 324 MG
81 TABLET ORAL DAILY
Refills: 0 | Status: DISCONTINUED | OUTPATIENT
Start: 2022-07-07 | End: 2022-07-20

## 2022-07-07 RX ORDER — INSULIN GLARGINE 100 [IU]/ML
20 INJECTION, SOLUTION SUBCUTANEOUS EVERY MORNING
Refills: 0 | Status: DISCONTINUED | OUTPATIENT
Start: 2022-07-08 | End: 2022-07-14

## 2022-07-07 RX ORDER — MIDODRINE HYDROCHLORIDE 2.5 MG/1
10 TABLET ORAL THREE TIMES A DAY
Refills: 0 | Status: DISCONTINUED | OUTPATIENT
Start: 2022-07-07 | End: 2022-07-08

## 2022-07-07 RX ORDER — INSULIN LISPRO 100/ML
7 VIAL (ML) SUBCUTANEOUS
Refills: 0 | Status: DISCONTINUED | OUTPATIENT
Start: 2022-07-07 | End: 2022-07-10

## 2022-07-07 RX ORDER — AMIODARONE HYDROCHLORIDE 400 MG/1
100 TABLET ORAL DAILY
Refills: 0 | Status: DISCONTINUED | OUTPATIENT
Start: 2022-07-07 | End: 2022-07-20

## 2022-07-07 RX ORDER — ATORVASTATIN CALCIUM 80 MG/1
20 TABLET, FILM COATED ORAL AT BEDTIME
Refills: 0 | Status: DISCONTINUED | OUTPATIENT
Start: 2022-07-07 | End: 2022-07-20

## 2022-07-07 RX ORDER — AMIODARONE HYDROCHLORIDE 400 MG/1
200 TABLET ORAL DAILY
Refills: 0 | Status: DISCONTINUED | OUTPATIENT
Start: 2022-07-07 | End: 2022-07-07

## 2022-07-07 RX ORDER — MIDODRINE HYDROCHLORIDE 2.5 MG/1
5 TABLET ORAL THREE TIMES A DAY
Refills: 0 | Status: DISCONTINUED | OUTPATIENT
Start: 2022-07-07 | End: 2022-07-07

## 2022-07-07 RX ADMIN — Medication 325 MILLIGRAM(S): at 18:39

## 2022-07-07 RX ADMIN — OXYCODONE HYDROCHLORIDE 10 MILLIGRAM(S): 5 TABLET ORAL at 09:00

## 2022-07-07 RX ADMIN — Medication 1 MILLIGRAM(S): at 14:17

## 2022-07-07 RX ADMIN — Medication 81 MILLIGRAM(S): at 14:16

## 2022-07-07 RX ADMIN — OXYCODONE HYDROCHLORIDE 10 MILLIGRAM(S): 5 TABLET ORAL at 14:54

## 2022-07-07 RX ADMIN — PREGABALIN 1000 MICROGRAM(S): 225 CAPSULE ORAL at 14:17

## 2022-07-07 RX ADMIN — AMIODARONE HYDROCHLORIDE 200 MILLIGRAM(S): 400 TABLET ORAL at 11:51

## 2022-07-07 RX ADMIN — OXYCODONE HYDROCHLORIDE 10 MILLIGRAM(S): 5 TABLET ORAL at 00:10

## 2022-07-07 RX ADMIN — OXYCODONE HYDROCHLORIDE 10 MILLIGRAM(S): 5 TABLET ORAL at 14:24

## 2022-07-07 RX ADMIN — OXYCODONE HYDROCHLORIDE 10 MILLIGRAM(S): 5 TABLET ORAL at 08:00

## 2022-07-07 RX ADMIN — Medication 6: at 07:59

## 2022-07-07 RX ADMIN — INSULIN GLARGINE 17 UNIT(S): 100 INJECTION, SOLUTION SUBCUTANEOUS at 08:10

## 2022-07-07 RX ADMIN — ATORVASTATIN CALCIUM 20 MILLIGRAM(S): 80 TABLET, FILM COATED ORAL at 21:16

## 2022-07-07 RX ADMIN — Medication 7 UNIT(S): at 18:35

## 2022-07-07 RX ADMIN — HYDROMORPHONE HYDROCHLORIDE 4 MILLIGRAM(S): 2 INJECTION INTRAMUSCULAR; INTRAVENOUS; SUBCUTANEOUS at 21:24

## 2022-07-07 RX ADMIN — MIDODRINE HYDROCHLORIDE 10 MILLIGRAM(S): 2.5 TABLET ORAL at 14:17

## 2022-07-07 RX ADMIN — ENOXAPARIN SODIUM 40 MILLIGRAM(S): 100 INJECTION SUBCUTANEOUS at 14:16

## 2022-07-07 RX ADMIN — Medication 10 UNIT(S): at 08:00

## 2022-07-07 RX ADMIN — Medication 6: at 18:35

## 2022-07-07 RX ADMIN — HYDROMORPHONE HYDROCHLORIDE 4 MILLIGRAM(S): 2 INJECTION INTRAMUSCULAR; INTRAVENOUS; SUBCUTANEOUS at 22:10

## 2022-07-07 NOTE — PROGRESS NOTE ADULT - SUBJECTIVE AND OBJECTIVE BOX
Patient is a 51y old  Male who presents with a chief complaint of Left hip Fracture (07 Jul 2022 09:15)      INTERVAL History of Present Illness/OVERNIGHT EVENTS: labile glucose  not accepted at [facility] meds reconciled    MEDICATIONS  (STANDING):  aMIOdarone    Tablet 200 milliGRAM(s) Oral daily  aspirin enteric coated 81 milliGRAM(s) Oral daily  atorvastatin 20 milliGRAM(s) Oral at bedtime  cyanocobalamin 1000 MICROGram(s) Oral daily  dextrose 5%. 1000 milliLiter(s) (50 mL/Hr) IV Continuous <Continuous>  dextrose 5%. 1000 milliLiter(s) (100 mL/Hr) IV Continuous <Continuous>  dextrose 50% Injectable 25 Gram(s) IV Push once  dextrose 50% Injectable 25 Gram(s) IV Push once  dextrose 50% Injectable 12.5 Gram(s) IV Push once  enoxaparin Injectable 40 milliGRAM(s) SubCutaneous every 24 hours  ferrous    sulfate 325 milliGRAM(s) Oral two times a day  folic acid 1 milliGRAM(s) Oral daily  glucagon  Injectable 1 milliGRAM(s) IntraMuscular once  insulin lispro (ADMELOG) corrective regimen sliding scale   SubCutaneous Before meals and at bedtime  insulin lispro Injectable (ADMELOG) 7 Unit(s) SubCutaneous three times a day before meals  levothyroxine 50 MICROGram(s) Oral daily  midodrine. 5 milliGRAM(s) Oral three times a day    MEDICATIONS  (PRN):  bisacodyl Suppository 10 milliGRAM(s) Rectal daily PRN If no bowel movement  dextrose Oral Gel 15 Gram(s) Oral once PRN Blood Glucose LESS THAN 70 milliGRAM(s)/deciliter  HYDROmorphone   Tablet 4 milliGRAM(s) Oral every 4 hours PRN Severe Pain (7 - 10)  HYDROmorphone  Injectable 0.5 milliGRAM(s) IV Push every 3 hours PRN Breakthrough  magnesium hydroxide Suspension 30 milliLiter(s) Oral daily PRN Constipation  ondansetron Injectable 4 milliGRAM(s) IV Push every 6 hours PRN Nausea and/or Vomiting  oxyCODONE    IR 10 milliGRAM(s) Oral every 3 hours PRN Moderate Pain (4 - 6)  oxyCODONE    IR 5 milliGRAM(s) Oral every 3 hours PRN Mild Pain (1 - 3)      Allergies    Allergy Status Unknown    Intolerances        REVIEW OF SYSTEMS:  Negative unless otherwise specified above.    Vital Signs Last 24 Hrs  T(C): 36.6 (07 Jul 2022 04:03), Max: 37.2 (06 Jul 2022 17:44)  T(F): 97.9 (07 Jul 2022 04:03), Max: 99 (06 Jul 2022 17:44)  HR: 87 (07 Jul 2022 04:03) (87 - 90)  BP: 116/72 (07 Jul 2022 04:03) (109/99 - 117/78)  BP(mean): --  RR: 18 (07 Jul 2022 04:03) (17 - 18)  SpO2: 98% (07 Jul 2022 04:03) (97% - 98%)        PHYSICAL EXAM:  GENERAL: No apparent distress, appears stated age  HEAD:  Atraumatic, Normocephalic  EYES: Conjunctiva and sclera clear, no discharge  ENMT: Moist mucous membranes, no nasal discharge  NECK: Supple, no JVD  CHEST/LUNG: Clear to auscultation bilaterally, no wheeze or rales  HEART: Regular rhythm, no rubs or gallops  ABDOMEN: Soft, Nontender, Nondistended  EXTREMITIES:  No clubbing, cyanosis but some postop LLE swelling  SKIN: No rash, no new discoloration  NERVOUS SYSTEM:  Alert & Oriented; Bilateral Lower extremity mobile, sensation to light touch intact      LABS:                        8.4    11.07 )-----------( 181      ( 07 Jul 2022 06:33 )             25.2     07 Jul 2022 06:33    129    |  92     |  35.3   ----------------------------<  219    4.2     |  24.0   |  1.85     Ca    8.4        07 Jul 2022 06:33    TPro  5.6    /  Alb  2.8    /  TBili  0.6    /  DBili  x      /  AST  17     /  ALT  10     /  AlkPhos  193    07 Jul 2022 06:33        CAPILLARY BLOOD GLUCOSE      POCT Blood Glucose.: 88 mg/dL (07 Jul 2022 11:49)  POCT Blood Glucose.: 282 mg/dL (07 Jul 2022 07:58)  POCT Blood Glucose.: 173 mg/dL (06 Jul 2022 22:50)  POCT Blood Glucose.: 252 mg/dL (06 Jul 2022 17:22)  POCT Blood Glucose.: 320 mg/dL (06 Jul 2022 13:59)      RADIOLOGY & ADDITIONAL TESTS:      Images reviewed personally    Consultant Notes Reviewed and Care Discussed with relevant Consultants. Island Pedicle Flap-Requiring Vessel Identification Text: The defect edges were debeveled with a #15 scalpel blade.  Given the location of the defect, shape of the defect and the proximity to free margins an island pedicle advancement flap was deemed most appropriate.  Using a sterile surgical marker, an appropriate advancement flap was drawn, based on the axial vessel mentioned above, incorporating the defect, outlining the appropriate donor tissue and placing the expected incisions within the relaxed skin tension lines where possible.    The area thus outlined was incised deep to adipose tissue with a #15 scalpel blade.  The skin margins were undermined to an appropriate distance in all directions around the primary defect and laterally outward around the island pedicle utilizing iris scissors.  There was minimal undermining beneath the pedicle flap.

## 2022-07-07 NOTE — CONSULT NOTE ADULT - SUBJECTIVE AND OBJECTIVE BOX
51yM was admitted on 07-02    Patient is a 51y old  Male who presents with a chief complaint of Left hip Fracture (06 Jul 2022 17:12)    HPI:  Mr. Caceres is a 51 year old male with history of diabetes, A. fib, depression, hypotension, HLD, hypothyroid [place] [facility] [place] of pain in his left hip. Patient reports that he was trying to get out the pool and then he misplaced his foot and fell down on to his left hip.  He had a hip fracture of his left hip in October 2021 and was repaired at [facility]. Patient was transferred to [facility] for evaluation.  He was seen by orthopedics and is now s/p left femur IMN.  He reports he is still having difficulty with his mobility.        Imaging reviewed showed:    ACC: [id number] EXAM:  XR HIP 2-3V LT                          PROCEDURE DATE:  07/02/2022          INTERPRETATION:  History:   Left hip fracture..    Technique: 2 x-rays of the left hip    Comparison: 7/2/2022, 10/19/2021    Findings/  Impression:    There is a proximal short intramedullary keith in the left femur. The   distal fixation screws fractured. There is sliding screw transfixes a   intratrochanteric region fracture there is callus formation. There is a   new proximal femoral fracture in the region of the intramedullary keith.      REVIEW OF SYSTEMS  Constitutional - No fever, No weight loss, No fatigue  HEENT - No eye pain, No visual disturbances, No difficulty hearing, No tinnitus, No vertigo, No neck pain  Respiratory - No cough, No wheezing, No shortness of breath  Cardiovascular - No chest pain, No palpitations  Gastrointestinal - No abdominal pain, No nausea, No vomiting, No diarrhea, No constipation  Genitourinary - No dysuria, No frequency, No hematuria, No incontinence  Neurological - No headaches, No memory loss, + loss of strength, + numbness, No tremors  Skin - No itching, No rashes, No lesions   Endocrine - No temperature intolerance  Musculoskeletal - + joint pain, No joint swelling, No muscle pain  Psychiatric - No depression, No anxiety    VITALS  T(C): 36.6 (07-07-22 @ 04:03), Max: 37.2 (07-06-22 @ 17:44)  HR: 87 (07-07-22 @ 04:03) (83 - 90)  BP: 116/72 (07-07-22 @ 04:03) (109/73 - 117/78)  RR: 18 (07-07-22 @ 04:03) (17 - 18)  SpO2: 98% (07-07-22 @ 04:03) (97% - 98%)  Wt(kg): --    PAST MEDICAL & SURGICAL HISTORY  DM (diabetes mellitus)    History of left hip replacement        SOCIAL HISTORY - as per documentation/history  Smoking - None  EtOH - None  Drugs - None    FUNCTIONAL HISTORY  Lives with his .  4 steps to enter, 7 steps to the second floor.  Previously independent.      CURRENT FUNCTIONAL STATUS  7/6/22  Bed Mobility: Rolling/Turning:     · Level of Hooper	unable to perform    Bed Mobility: Sit to Supine:     · Level of Hooper	minimum assist (75% patients effort); Left LE  · Physical Assist/Nonphysical Assist	1 person assist; verbal cues    Bed Mobility: Supine to Sit:     · Level of Hooper	minimum assist (75% patients effort); Left LE  · Physical Assist/Nonphysical Assist	1 person assist; verbal cues  · Assistive Device	bed rails    Transfer: Bed to Chair:     Transfer Skill: Bed to Chair   · Level of Hooper	unable to perform; Pt declined OOB to chair secondary to dizziness    Transfer: Chair to Bed:     · Level of Hooper	unable to perform    Transfer: Sit to Stand:     · Level of Hooper	minimum assist (75% patients effort)  · Physical Assist/Nonphysical Assist	1 person assist; verbal cues  · Weight-Bearing Restrictions	weight-bearing as tolerated  · Assistive Device	rolling walker    Transfer: Stand to Sit:     · Level of Hooper	minimum assist (75% patients effort)  · Physical Assist/Nonphysical Assist	1 person assist; verbal cues  · Weight-Bearing Restrictions	weight-bearing as tolerated  · Assistive Device	rolling walker    Gait Skills:     · Level of Hooper	minimum assist (75% patients effort)  · Physical Assist/Nonphysical Assist	1 person assist; verbal cues  · Weight-Bearing Restrictions	weight-bearing as tolerated  · Assistive Device	rolling walker  · Gait Distance	3 feet, pt requesting to sit secondary to dizziness "this happens to me often"    Gait Analysis:     · Gait Pattern Used	3-point gait  · Gait Deviations Noted	decreased catalina; decreased step length; decreased weight-shifting ability  · Impairments Contributing to Gait Deviations	impaired balance; decreased flexibility; pain; decreased ROM; decreased strength    Stair Negotiation:     · Level of Hooper	unable to perform; unsafe        FAMILY HISTORY   FH: lung cancer (Mother)    FH: prostate cancer (Father)        RECENT LABS - Reviewed  CBC Full  -  ( 07 Jul 2022 06:33 )  WBC Count : 11.07 K/uL  RBC Count : 2.72 M/uL  Hemoglobin : 8.4 g/dL  Hematocrit : 25.2 %  Platelet Count - Automated : 181 K/uL  Mean Cell Volume : 92.6 fl  Mean Cell Hemoglobin : 30.9 pg  Mean Cell Hemoglobin Concentration : 33.3 gm/dL  Auto Neutrophil # : 8.16 K/uL  Auto Lymphocyte # : 1.53 K/uL  Auto Monocyte # : 1.19 K/uL  Auto Eosinophil # : 0.07 K/uL  Auto Basophil # : 0.03 K/uL  Auto Neutrophil % : 73.8 %  Auto Lymphocyte % : 13.8 %  Auto Monocyte % : 10.7 %  Auto Eosinophil % : 0.6 %  Auto Basophil % : 0.3 %    07-07    129<L>  |  92<L>  |  35.3<H>  ----------------------------<  219<H>  4.2   |  24.0  |  1.85<H>    Ca    8.4<L>      07 Jul 2022 06:33    TPro  5.6<L>  /  Alb  2.8<L>  /  TBili  0.6  /  DBili  x   /  AST  17  /  ALT  10  /  AlkPhos  193<H>  07-07        ALLERGIES  Allergy Status Unknown      MEDICATIONS   bisacodyl Suppository 10 milliGRAM(s) Rectal daily PRN  dextrose 5%. 1000 milliLiter(s) IV Continuous <Continuous>  dextrose 5%. 1000 milliLiter(s) IV Continuous <Continuous>  dextrose 50% Injectable 25 Gram(s) IV Push once  dextrose 50% Injectable 12.5 Gram(s) IV Push once  dextrose 50% Injectable 25 Gram(s) IV Push once  dextrose Oral Gel 15 Gram(s) Oral once PRN  enoxaparin Injectable 40 milliGRAM(s) SubCutaneous every 24 hours  glucagon  Injectable 1 milliGRAM(s) IntraMuscular once  HYDROmorphone   Tablet 4 milliGRAM(s) Oral every 4 hours PRN  HYDROmorphone  Injectable 0.5 milliGRAM(s) IV Push every 3 hours PRN  insulin glargine Injectable (LANTUS) 17 Unit(s) SubCutaneous every morning  insulin glargine Injectable (LANTUS) 17 Unit(s) SubCutaneous at bedtime  insulin lispro (ADMELOG) corrective regimen sliding scale   SubCutaneous Before meals and at bedtime  insulin lispro Injectable (ADMELOG) 10 Unit(s) SubCutaneous three times a day before meals  magnesium hydroxide Suspension 30 milliLiter(s) Oral daily PRN  ondansetron Injectable 4 milliGRAM(s) IV Push every 6 hours PRN  oxyCODONE    IR 10 milliGRAM(s) Oral every 3 hours PRN  oxyCODONE    IR 5 milliGRAM(s) Oral every 3 hours PRN      ----------------------------------------------------------------------------------------  PHYSICAL EXAM  Constitutional - NAD, Comfortable  HEENT - NCAT, EOMI  Neck - Supple, No limited ROM  Chest - Breathing comfortably, No wheezing  Cardiovascular - No cyanosis   Abdomen - Soft   Extremities - No C/C/E, No calf tenderness   Neurologic Exam -                    Cognitive - Awake, Alert, AAO to self, place, date, year, situation     Communication - Fluent, No dysarthria     Cranial Nerves - CN 2-12 intact     Motor - 5/5 in B/L UE and RLE, LLE 1-2/5 in hip flexion/knee extension, 5/5 in ankle dorsi-plantar flexion      Sensory - Intact to LT, decrease is distal feet symmetric      Reflexes - No clonus   Psychiatric - Mood stable, Affect WNL  ----------------------------------------------------------------------------------------  ASSESSMENT/PLAN  51yMale with functional deficits after left hip fracture, s/p left femur IMN  S/P Left Femur IMN - WBAT   Pain - Tylenol, oxycodone  DVT PPX - SCDs, Lovenox   Rehab - Continue PT/OT.  Recommend ACUTE inpatient rehabilitation for the functional deficits consisting of 3 hours of therapy/day & 24 hour RN/daily PMR physician for comorbid medical management. Patient will be able to tolerate 3 hours a day.    Will continue to follow. Functional progress will determine ongoing rehab dispo recommendations, which may change.    Continue bedside therapy as well as OOB throughout the day with mobilization by staff to maintain cardiopulmonary function and prevention of secondary complications related to debility.

## 2022-07-07 NOTE — PROGRESS NOTE ADULT - SUBJECTIVE AND OBJECTIVE BOX
Pt Name: ELZBIETA CÁRDENAS  MRN: [id number]    Procedure: left femur IM nail replacement  Surgeon:  Eliza    Patient is a 51y Male being followed for left femur IM nail replacement, POD#2. Patient seen and examined at bedside. Patient is doing well. Pain is controlled with the prescribed medication. Reports some difficulty working with PT secondary to pain. Denies CP, SOB, N/V, numbness/tingling. No other orthopedic complaints.        Vital Signs Last 24 Hrs  T(C): 36.6 (07 Jul 2022 04:03), Max: 37.2 (06 Jul 2022 17:44)  T(F): 97.9 (07 Jul 2022 04:03), Max: 99 (06 Jul 2022 17:44)  HR: 87 (07 Jul 2022 04:03) (87 - 90)  BP: 116/72 (07 Jul 2022 04:03) (109/99 - 117/78)  BP(mean): --  RR: 18 (07 Jul 2022 04:03) (17 - 18)  SpO2: 98% (07 Jul 2022 04:03) (97% - 98%)  Daily     Daily                           8.4    11.07 )-----------( 181      ( 07 Jul 2022 06:33 )             25.2     07-07    129<L>  |  92<L>  |  35.3<H>  ----------------------------<  219<H>  4.2   |  24.0  |  1.85<H>    Ca    8.4<L>      07 Jul 2022 06:33    TPro  5.6<L>  /  Alb  2.8<L>  /  TBili  0.6  /  DBili  x   /  AST  17  /  ALT  10  /  AlkPhos  193<H>  07-07      PHYSICAL EXAM:    Appearance: Alert, responsive, in no acute distress.    MSK;     Left Lower Extremity: Left prevena vac dressing is intac, holding suction. Staining to proximal dressing. Canister and tubing empty. No abrasions, ecchymosis, or erythema. No bleeding. Thigh soft and compressible. Sensation is grossly intact to light touch. + dorsi/plantarflexion/EHL/FHL. DP pulses 2+. Cap refill < 2 seconds. No cyanosis. No signs of venous insufficiency or stasis.           A/P:  Pt is a  51y Male s/p left femur IM nail replacement, POD#2    PLAN:     * Pain control  * strict elevation  * DVTp  * Weight Bearing Status: WBAT LLE  * PT/OT  * Continue care as per primary team  * dispo: acute rehab

## 2022-07-08 LAB
ALBUMIN SERPL ELPH-MCNC: 2.6 G/DL — LOW (ref 3.3–5.2)
ALP SERPL-CCNC: 229 U/L — HIGH (ref 40–120)
ALT FLD-CCNC: 17 U/L — SIGNIFICANT CHANGE UP
ANION GAP SERPL CALC-SCNC: 12 MMOL/L — SIGNIFICANT CHANGE UP (ref 5–17)
AST SERPL-CCNC: 28 U/L — SIGNIFICANT CHANGE UP
BASOPHILS # BLD AUTO: 0.05 K/UL — SIGNIFICANT CHANGE UP (ref 0–0.2)
BASOPHILS NFR BLD AUTO: 0.5 % — SIGNIFICANT CHANGE UP (ref 0–2)
BILIRUB SERPL-MCNC: 0.7 MG/DL — SIGNIFICANT CHANGE UP (ref 0.4–2)
BUN SERPL-MCNC: 35.9 MG/DL — HIGH (ref 8–20)
CALCIUM SERPL-MCNC: 8.6 MG/DL — SIGNIFICANT CHANGE UP (ref 8.6–10.2)
CHLORIDE SERPL-SCNC: 93 MMOL/L — LOW (ref 98–107)
CO2 SERPL-SCNC: 24 MMOL/L — SIGNIFICANT CHANGE UP (ref 22–29)
CREAT SERPL-MCNC: 1.79 MG/DL — HIGH (ref 0.5–1.3)
EGFR: 45 ML/MIN/1.73M2 — LOW
EOSINOPHIL # BLD AUTO: 0.11 K/UL — SIGNIFICANT CHANGE UP (ref 0–0.5)
EOSINOPHIL NFR BLD AUTO: 1.1 % — SIGNIFICANT CHANGE UP (ref 0–6)
GLUCOSE BLDC GLUCOMTR-MCNC: 213 MG/DL — HIGH (ref 70–99)
GLUCOSE BLDC GLUCOMTR-MCNC: 310 MG/DL — HIGH (ref 70–99)
GLUCOSE BLDC GLUCOMTR-MCNC: 324 MG/DL — HIGH (ref 70–99)
GLUCOSE BLDC GLUCOMTR-MCNC: 80 MG/DL — SIGNIFICANT CHANGE UP (ref 70–99)
GLUCOSE SERPL-MCNC: 281 MG/DL — HIGH (ref 70–99)
HCT VFR BLD CALC: 25.3 % — LOW (ref 39–50)
HGB BLD-MCNC: 8.1 G/DL — LOW (ref 13–17)
IMM GRANULOCYTES NFR BLD AUTO: 1.2 % — SIGNIFICANT CHANGE UP (ref 0–1.5)
LYMPHOCYTES # BLD AUTO: 1.27 K/UL — SIGNIFICANT CHANGE UP (ref 1–3.3)
LYMPHOCYTES # BLD AUTO: 13.1 % — SIGNIFICANT CHANGE UP (ref 13–44)
MCHC RBC-ENTMCNC: 30 PG — SIGNIFICANT CHANGE UP (ref 27–34)
MCHC RBC-ENTMCNC: 32 GM/DL — SIGNIFICANT CHANGE UP (ref 32–36)
MCV RBC AUTO: 93.7 FL — SIGNIFICANT CHANGE UP (ref 80–100)
MONOCYTES # BLD AUTO: 0.78 K/UL — SIGNIFICANT CHANGE UP (ref 0–0.9)
MONOCYTES NFR BLD AUTO: 8 % — SIGNIFICANT CHANGE UP (ref 2–14)
NEUTROPHILS # BLD AUTO: 7.39 K/UL — SIGNIFICANT CHANGE UP (ref 1.8–7.4)
NEUTROPHILS NFR BLD AUTO: 76.1 % — SIGNIFICANT CHANGE UP (ref 43–77)
PLATELET # BLD AUTO: 192 K/UL — SIGNIFICANT CHANGE UP (ref 150–400)
POTASSIUM SERPL-MCNC: 4.4 MMOL/L — SIGNIFICANT CHANGE UP (ref 3.5–5.3)
POTASSIUM SERPL-SCNC: 4.4 MMOL/L — SIGNIFICANT CHANGE UP (ref 3.5–5.3)
PROT SERPL-MCNC: 5.9 G/DL — LOW (ref 6.6–8.7)
RBC # BLD: 2.7 M/UL — LOW (ref 4.2–5.8)
RBC # FLD: 12.8 % — SIGNIFICANT CHANGE UP (ref 10.3–14.5)
SODIUM SERPL-SCNC: 129 MMOL/L — LOW (ref 135–145)
WBC # BLD: 9.72 K/UL — SIGNIFICANT CHANGE UP (ref 3.8–10.5)
WBC # FLD AUTO: 9.72 K/UL — SIGNIFICANT CHANGE UP (ref 3.8–10.5)

## 2022-07-08 PROCEDURE — 99232 SBSQ HOSP IP/OBS MODERATE 35: CPT

## 2022-07-08 PROCEDURE — 99233 SBSQ HOSP IP/OBS HIGH 50: CPT

## 2022-07-08 RX ORDER — MIDODRINE HYDROCHLORIDE 2.5 MG/1
15 TABLET ORAL EVERY 8 HOURS
Refills: 0 | Status: DISCONTINUED | OUTPATIENT
Start: 2022-07-08 | End: 2022-07-20

## 2022-07-08 RX ADMIN — OXYCODONE HYDROCHLORIDE 10 MILLIGRAM(S): 5 TABLET ORAL at 10:54

## 2022-07-08 RX ADMIN — PREGABALIN 1000 MICROGRAM(S): 225 CAPSULE ORAL at 13:57

## 2022-07-08 RX ADMIN — OXYCODONE HYDROCHLORIDE 10 MILLIGRAM(S): 5 TABLET ORAL at 08:56

## 2022-07-08 RX ADMIN — AMIODARONE HYDROCHLORIDE 100 MILLIGRAM(S): 400 TABLET ORAL at 06:25

## 2022-07-08 RX ADMIN — Medication 50 MICROGRAM(S): at 06:26

## 2022-07-08 RX ADMIN — Medication 8: at 22:48

## 2022-07-08 RX ADMIN — Medication 1 MILLIGRAM(S): at 13:57

## 2022-07-08 RX ADMIN — Medication 7 UNIT(S): at 08:53

## 2022-07-08 RX ADMIN — ATORVASTATIN CALCIUM 20 MILLIGRAM(S): 80 TABLET, FILM COATED ORAL at 22:52

## 2022-07-08 RX ADMIN — MIDODRINE HYDROCHLORIDE 10 MILLIGRAM(S): 2.5 TABLET ORAL at 06:26

## 2022-07-08 RX ADMIN — Medication 325 MILLIGRAM(S): at 17:46

## 2022-07-08 RX ADMIN — ONDANSETRON 4 MILLIGRAM(S): 8 TABLET, FILM COATED ORAL at 08:56

## 2022-07-08 RX ADMIN — Medication 81 MILLIGRAM(S): at 13:57

## 2022-07-08 RX ADMIN — OXYCODONE HYDROCHLORIDE 10 MILLIGRAM(S): 5 TABLET ORAL at 23:01

## 2022-07-08 RX ADMIN — ENOXAPARIN SODIUM 40 MILLIGRAM(S): 100 INJECTION SUBCUTANEOUS at 15:38

## 2022-07-08 RX ADMIN — Medication 7 UNIT(S): at 13:09

## 2022-07-08 RX ADMIN — Medication 8: at 08:54

## 2022-07-08 RX ADMIN — MIDODRINE HYDROCHLORIDE 15 MILLIGRAM(S): 2.5 TABLET ORAL at 15:38

## 2022-07-08 RX ADMIN — Medication 4: at 13:10

## 2022-07-08 RX ADMIN — Medication 325 MILLIGRAM(S): at 06:25

## 2022-07-08 RX ADMIN — INSULIN GLARGINE 20 UNIT(S): 100 INJECTION, SOLUTION SUBCUTANEOUS at 08:55

## 2022-07-08 RX ADMIN — MIDODRINE HYDROCHLORIDE 15 MILLIGRAM(S): 2.5 TABLET ORAL at 22:52

## 2022-07-08 NOTE — PROGRESS NOTE ADULT - SUBJECTIVE AND OBJECTIVE BOX
Patient walked with PT and afterwards, felt dizzy and nauseated.  Has not eaten breakfast yet.   Reports that this is the best he performed.     REVIEW OF SYSTEMS  Constitutional - No fever,  +fatigue  Neurological - +loss of strength   Musculoskeletal - +joint pain, +muscle pain    FUNCTIONAL PROGRESS  7/8 PT  Bed Mobility  Bed Mobility Training Supine-to-Sit: minimum assist (75% patient effort);  1 person assist;  verbal cues  Bed Mobility Training Limitations: pain;  decreased strength    Sit-Stand Transfer Training  Transfer Training Sit-to-Stand Transfer: minimum assist (75% patient effort);  1 person assist;  verbal cues;  weight-bearing as tolerated   rolling walker  Transfer Training Stand-to-Sit Transfer: minimum assist (75% patient effort);  1 person assist;  verbal cues;  weight-bearing as tolerated   rolling walker  Sit-to-Stand Transfer Training Transfer Safety Analysis: decreased sequencing ability;  decreased weight-shifting ability;  decreased strength;  pain;  rolling walker    Gait Training  Gait Training: minimum assist (75% patient effort);  1 person assist;  verbal cues;  weight-bearing as tolerated   rolling walker;  15 feet  Gait Analysis: swing-to gait   crouch;  decreased hip/knee flexion;  shuffling;  decreased step length;  decreased strength;  pain;  15 feet;  rolling walker    7/6 OT  Bathing Training:     · Level of [place]	moderate assist (50% patients effort)  · Physical Assist/Nonphysical Assist	verbal cues; 1 person assist  · Assistive Device	tub seat; grab bars; long handled sponge    Upper Body Dressing Training:     · Level of [place]	supervision    Lower Body Dressing Training:     · Level of [place]	maximum assist (25% patients effort)  · Physical Assist/Nonphysical Assist	verbal cues; 1 person assist  · Assistive Device	dressing stick; elastic laces; long-handled shoe horn; reacher; sock-aid; pt may benefit from use of hip kit    Toilet Hygiene Training:     · Level of [place]	minimum assist (75% patients effort)  · Physical Assist/Nonphysical Assist	for clothing management in standing    Grooming Training:     · Level of [place]	independent    VITALS  T(C): 36.6 (07-08-22 @ 04:40), Max: 36.8 (07-07-22 @ 17:03)  HR: 103 (07-08-22 @ 10:08) (79 - 103)  BP: 91/61 (07-08-22 @ 10:08) (91/61 - 125/75)  RR: 18 (07-08-22 @ 04:40) (18 - 18)  SpO2: 96% (07-08-22 @ 04:40) (95% - 97%)  Wt(kg): --    MEDICATIONS   aMIOdarone    Tablet 100 milliGRAM(s) daily  aspirin enteric coated 81 milliGRAM(s) daily  atorvastatin 20 milliGRAM(s) at bedtime  bisacodyl Suppository 10 milliGRAM(s) daily PRN  cyanocobalamin 1000 MICROGram(s) daily  dextrose 5%. 1000 milliLiter(s) <Continuous>  dextrose 5%. 1000 milliLiter(s) <Continuous>  dextrose 50% Injectable 25 Gram(s) once  dextrose 50% Injectable 25 Gram(s) once  dextrose 50% Injectable 12.5 Gram(s) once  dextrose Oral Gel 15 Gram(s) once PRN  enoxaparin Injectable 40 milliGRAM(s) every 24 hours  ferrous    sulfate 325 milliGRAM(s) two times a day  folic acid 1 milliGRAM(s) daily  glucagon  Injectable 1 milliGRAM(s) once  HYDROmorphone   Tablet 4 milliGRAM(s) every 4 hours PRN  HYDROmorphone  Injectable 0.5 milliGRAM(s) every 3 hours PRN  insulin glargine Injectable (LANTUS) 20 Unit(s) every morning  insulin lispro (ADMELOG) corrective regimen sliding scale   Before meals and at bedtime  insulin lispro Injectable (ADMELOG) 7 Unit(s) three times a day before meals  levothyroxine 50 MICROGram(s) daily  magnesium hydroxide Suspension 30 milliLiter(s) daily PRN  midodrine. 10 milliGRAM(s) three times a day  ondansetron Injectable 4 milliGRAM(s) every 6 hours PRN  oxyCODONE    IR 10 milliGRAM(s) every 3 hours PRN  oxyCODONE    IR 5 milliGRAM(s) every 3 hours PRN      RECENT LABS/IMAGING                          8.1    9.72  )-----------( 192      ( 08 Jul 2022 07:53 )             25.3     07-08    129<L>  |  93<L>  |  35.9<H>  ----------------------------<  281<H>  4.4   |  24.0  |  1.79<H>    Ca    8.6      08 Jul 2022 07:53    TPro  5.9<L>  /  Alb  2.6<L>  /  TBili  0.7  /  DBili  x   /  AST  28  /  ALT  17  /  AlkPhos  229<H>  07-08                XR LEFT HIP 7/2 - There is a proximal short intramedullary keith in the left femur. The   distal fixation screws fractured. There is sliding screw transfixes a   intratrochanteric region fracture there is callus formation. There is a   new proximal femoral fracture in the region of the intramedullary keith.    ----------------------------------------------------------------------------------------  PHYSICAL EXAM  Constitutional - NAD, Uncomfortable  Extremities - No edema   Neurologic Exam -                    Cognitive - AAOx4     Motor - NO focal deficits - related to pain/nausea     Sensory - BLE distal foot numbness  Psychiatric - Fatigued  ----------------------------------------------------------------------------------------  ASSESSMENT/PLAN  51yMale with functional deficits after a mechanical fall sustaining a periprosthetic fracture  Left  periprosthetic fracture s/p IMN - WBAT  DM - Lantus, Lispro  AFIB - Amiodarone  CAD - Lipitor, ASA  Pain - Tylenol, Dilaudid, Oxycodone - DC IV Medications  DVT PPX - SCDs, Lovenox   Rehab - Continue to recommend ACUTE inpatient rehabilitation for the functional deficits consisting of 3 hours of therapy/day & 24 hour RN/daily PMR physician for comorbid medical management. Will continue to follow for ongoing rehab needs and recommendations. Patient will be able to tolerate 3 hours a day.    Continue bedside therapy as well as OOB throughout the day with mobilization throughout the day with staff to maintain cardiopulmonary function and prevention of secondary complications related to debility.     Discussed with rehab clinical team.

## 2022-07-08 NOTE — PROGRESS NOTE ADULT - ASSESSMENT
52 yo male with history of diabetes,  A. fib, depression, hypotension, HLD, hypothyroid initially presented to [facility] with complaints of pain in his left hip. Patient reports that he was trying to get out the pool and then he misplaced his foot and fell down on to his left hip. Patient complaints of pain at hip site. No other complaints. Of note, patient had a hip fracture of his left hip in October 2021 and was repaired at [facility]. Patient was transferred to [facility] for evaluation. Orthopedist requested admission to medicine for medical management.    *Left femur fracture s/p ORIF POD3  Ortho consult noted   Pain control  WBAT LLE   cont PT  pending acute rehab     *Orthostatic hypotension   home dose 10mg   will increase to 15mg     *Anemia, postop acute blood loss  transfuse if hb<7 or symptomatic.   recheck CBC prior to discharge    *DM - uncontrolled   atypical insulin regimen at home  ISS, monitor BS   A1C -9    *Hyponatremia  Corrected sodium is 132  Monitor BMP    *CKD3  Monitor Renal function  avoid nephrotoxics. monitor intake and output.     *Afib  S/p ablation  C/w amiodarone  Not on AC    *Hypothyroid  C/w Synthroid    *HLD  Atorvastatin    DVT prophylaxis: Lovenox x 4 weeks  pending acute rehab

## 2022-07-08 NOTE — PROGRESS NOTE ADULT - SUBJECTIVE AND OBJECTIVE BOX
HPI  Pt is a 52yo M admitted to [facility] for left hip fx s/p ORIF.   Pt was seen and examined at bedside. No overnight complaints. Episode of orthostatic hypotension noted. Resumed home dose of Midodrine today. BGM noted     Vital Signs Last 24 Hrs  T(C): 36.7 (08 Jul 2022 12:01), Max: 36.8 (07 Jul 2022 17:03)  T(F): 98 (08 Jul 2022 12:01), Max: 98.3 (07 Jul 2022 17:03)  HR: 85 (08 Jul 2022 12:01) (79 - 103)  BP: 127/79 (08 Jul 2022 12:01) (91/61 - 127/79)  BP(mean): --  RR: 18 (08 Jul 2022 12:01) (18 - 18)  SpO2: 96% (08 Jul 2022 12:01) (95% - 97%)    Parameters below as of 08 Jul 2022 12:01  Patient On (Oxygen Delivery Method): room air        I&O's Summary    07 Jul 2022 07:01  -  08 Jul 2022 07:00  --------------------------------------------------------  IN: 0 mL / OUT: 1551 mL / NET: -1551 mL    08 Jul 2022 07:01  -  08 Jul 2022 12:56  --------------------------------------------------------  IN: 0 mL / OUT: 675 mL / NET: -675 mL        CAPILLARY BLOOD GLUCOSE      POCT Blood Glucose.: 213 mg/dL (08 Jul 2022 12:16)  POCT Blood Glucose.: 310 mg/dL (08 Jul 2022 08:35)  POCT Blood Glucose.: 138 mg/dL (07 Jul 2022 21:14)  POCT Blood Glucose.: 281 mg/dL (07 Jul 2022 18:34)  POCT Blood Glucose.: 229 mg/dL (07 Jul 2022 16:19)  POCT Blood Glucose.: 74 mg/dL (07 Jul 2022 13:00)      PHYSICAL EXAM:    Constitutional: NAD, awake    HEENT: PERR, EOMI,   Neck: Soft and supple,    Respiratory: Breath sounds are clear bilaterally,   Cardiovascular: S1 and S2,    Gastrointestinal: Bowel Sounds present, soft, nontender,    Extremities: left lower ext +2   Vascular: 2+ peripheral pulses  Neurological: A/O x 3,    Musculoskeletal: 5/5 strength b/l upper and lower extremities  Skin: left hip dressed     MEDICATIONS:  MEDICATIONS  (STANDING):  aMIOdarone    Tablet 100 milliGRAM(s) Oral daily  aspirin enteric coated 81 milliGRAM(s) Oral daily  atorvastatin 20 milliGRAM(s) Oral at bedtime  cyanocobalamin 1000 MICROGram(s) Oral daily  dextrose 5%. 1000 milliLiter(s) (50 mL/Hr) IV Continuous <Continuous>  dextrose 5%. 1000 milliLiter(s) (100 mL/Hr) IV Continuous <Continuous>  dextrose 50% Injectable 25 Gram(s) IV Push once  dextrose 50% Injectable 25 Gram(s) IV Push once  dextrose 50% Injectable 12.5 Gram(s) IV Push once  enoxaparin Injectable 40 milliGRAM(s) SubCutaneous every 24 hours  ferrous    sulfate 325 milliGRAM(s) Oral two times a day  folic acid 1 milliGRAM(s) Oral daily  glucagon  Injectable 1 milliGRAM(s) IntraMuscular once  insulin glargine Injectable (LANTUS) 20 Unit(s) SubCutaneous every morning  insulin lispro (ADMELOG) corrective regimen sliding scale   SubCutaneous Before meals and at bedtime  insulin lispro Injectable (ADMELOG) 7 Unit(s) SubCutaneous three times a day before meals  levothyroxine 50 MICROGram(s) Oral daily  midodrine. 10 milliGRAM(s) Oral three times a day      LABS: All Labs Reviewed:                        8.1    9.72  )-----------( 192      ( 08 Jul 2022 07:53 )             25.3     07-08    129<L>  |  93<L>  |  35.9<H>  ----------------------------<  281<H>  4.4   |  24.0  |  1.79<H>    Ca    8.6      08 Jul 2022 07:53    TPro  5.9<L>  /  Alb  2.6<L>  /  TBili  0.7  /  DBili  x   /  AST  28  /  ALT  17  /  AlkPhos  229<H>  07-08          Blood Culture: 07-05 @ 23:51  Organism --  Gram Stain Blood -- Gram Stain --  Specimen Source .Surgical Swab femoral neck #1 (swab)  Culture-Blood --    07-05 @ 23:38  Organism --  Gram Stain Blood -- Gram Stain --  Specimen Source .Other femoral neck #2 (swab)  Culture-Blood --        RADIOLOGY/EKG:    DVT PPX:    ADVANCED DIRECTIVE:    DISPOSITION:

## 2022-07-08 NOTE — PROGRESS NOTE ADULT - SUBJECTIVE AND OBJECTIVE BOX
Pt Name: ELZBIETA CÁRDENAS    MRN: [id number]    Patient is a 51y Male being followed for left femur IM nail replacement, POD#3. Patient seen and examined at bedside. Patient is doing well. Pain is controlled with the prescribed medication. Reports some difficulty working with PT secondary to pain. Denies CP, SOB, N/V, numbness/tingling. No other orthopedic complaints. Patient reports his plan is to go to rehab.     PAST MEDICAL & SURGICAL HISTORY:  PAST MEDICAL & SURGICAL HISTORY:  DM (diabetes mellitus)    History of left hip replacement    Allergies: Allergy Status Unknown    Medications: aMIOdarone    Tablet 100 milliGRAM(s) Oral daily  aspirin enteric coated 81 milliGRAM(s) Oral daily  atorvastatin 20 milliGRAM(s) Oral at bedtime  bisacodyl Suppository 10 milliGRAM(s) Rectal daily PRN  cyanocobalamin 1000 MICROGram(s) Oral daily  dextrose 5%. 1000 milliLiter(s) IV Continuous <Continuous>  dextrose 5%. 1000 milliLiter(s) IV Continuous <Continuous>  dextrose 50% Injectable 25 Gram(s) IV Push once  dextrose 50% Injectable 25 Gram(s) IV Push once  dextrose 50% Injectable 12.5 Gram(s) IV Push once  dextrose Oral Gel 15 Gram(s) Oral once PRN  enoxaparin Injectable 40 milliGRAM(s) SubCutaneous every 24 hours  ferrous    sulfate 325 milliGRAM(s) Oral two times a day  folic acid 1 milliGRAM(s) Oral daily  glucagon  Injectable 1 milliGRAM(s) IntraMuscular once  HYDROmorphone   Tablet 4 milliGRAM(s) Oral every 4 hours PRN  HYDROmorphone  Injectable 0.5 milliGRAM(s) IV Push every 3 hours PRN  insulin glargine Injectable (LANTUS) 20 Unit(s) SubCutaneous every morning  insulin lispro (ADMELOG) corrective regimen sliding scale   SubCutaneous Before meals and at bedtime  insulin lispro Injectable (ADMELOG) 7 Unit(s) SubCutaneous three times a day before meals  levothyroxine 50 MICROGram(s) Oral daily  magnesium hydroxide Suspension 30 milliLiter(s) Oral daily PRN  midodrine. 10 milliGRAM(s) Oral three times a day  ondansetron Injectable 4 milliGRAM(s) IV Push every 6 hours PRN  oxyCODONE    IR 10 milliGRAM(s) Oral every 3 hours PRN  oxyCODONE    IR 5 milliGRAM(s) Oral every 3 hours PRN                          8.1    9.72  )-----------( 192      ( 08 Jul 2022 07:53 )             25.3     07-08    129<L>  |  93<L>  |  35.9<H>  ----------------------------<  281<H>  4.4   |  24.0  |  1.79<H>    Ca    8.6      08 Jul 2022 07:53    TPro  5.9<L>  /  Alb  2.6<L>  /  TBili  0.7  /  DBili  x   /  AST  28  /  ALT  17  /  AlkPhos  229<H>  07-08      PHYSICAL EXAM:    Vital Signs Last 24 Hrs  T(C): 36.6 (08 Jul 2022 04:40), Max: 36.8 (07 Jul 2022 17:03)  T(F): 97.8 (08 Jul 2022 04:40), Max: 98.3 (07 Jul 2022 17:03)  HR: 79 (08 Jul 2022 04:40) (79 - 85)  BP: 125/75 (08 Jul 2022 04:40) (104/66 - 125/75)  BP(mean): --  RR: 18 (08 Jul 2022 04:40) (18 - 18)  SpO2: 96% (08 Jul 2022 04:40) (95% - 97%)    Parameters below as of 08 Jul 2022 04:40  Patient On (Oxygen Delivery Method): room air    Daily       Appearance: Alert, responsive, in no acute distress.  LLE: Left prevena vac dressing is intact, holding suction. No abrasions, ecchymosis, or erythema. Thigh soft and compressible. Sensation is grossly intact to light touch. + dorsi/plantarflexion/EHL/FHL. DP pulses 2+. Cap refill < 2 seconds. No cyanosis. No signs of venous insufficiency or stasis.     A/P:  Pt is a  51y Male s/p left femur IM nail replacement, POD#3    PLAN:     * Pain control  * strict elevation  * DVTp  * Weight Bearing Status: WBAT LLE  * PT/OT  * Continue care as per primary team  * dispo: acute rehab

## 2022-07-09 LAB
ANION GAP SERPL CALC-SCNC: 13 MMOL/L — SIGNIFICANT CHANGE UP (ref 5–17)
BUN SERPL-MCNC: 34.2 MG/DL — HIGH (ref 8–20)
CALCIUM SERPL-MCNC: 8.7 MG/DL — SIGNIFICANT CHANGE UP (ref 8.6–10.2)
CHLORIDE SERPL-SCNC: 95 MMOL/L — LOW (ref 98–107)
CO2 SERPL-SCNC: 23 MMOL/L — SIGNIFICANT CHANGE UP (ref 22–29)
CREAT SERPL-MCNC: 1.68 MG/DL — HIGH (ref 0.5–1.3)
EGFR: 48 ML/MIN/1.73M2 — LOW
GLUCOSE BLDC GLUCOMTR-MCNC: 166 MG/DL — HIGH (ref 70–99)
GLUCOSE BLDC GLUCOMTR-MCNC: 227 MG/DL — HIGH (ref 70–99)
GLUCOSE BLDC GLUCOMTR-MCNC: 320 MG/DL — HIGH (ref 70–99)
GLUCOSE BLDC GLUCOMTR-MCNC: 91 MG/DL — SIGNIFICANT CHANGE UP (ref 70–99)
GLUCOSE SERPL-MCNC: 287 MG/DL — HIGH (ref 70–99)
HCT VFR BLD CALC: 24.4 % — LOW (ref 39–50)
HGB BLD-MCNC: 8.2 G/DL — LOW (ref 13–17)
MAGNESIUM SERPL-MCNC: 1.7 MG/DL — SIGNIFICANT CHANGE UP (ref 1.6–2.6)
MCHC RBC-ENTMCNC: 31.5 PG — SIGNIFICANT CHANGE UP (ref 27–34)
MCHC RBC-ENTMCNC: 33.6 GM/DL — SIGNIFICANT CHANGE UP (ref 32–36)
MCV RBC AUTO: 93.8 FL — SIGNIFICANT CHANGE UP (ref 80–100)
PHOSPHATE SERPL-MCNC: 3.6 MG/DL — SIGNIFICANT CHANGE UP (ref 2.4–4.7)
PLATELET # BLD AUTO: 289 K/UL — SIGNIFICANT CHANGE UP (ref 150–400)
POTASSIUM SERPL-MCNC: 4.5 MMOL/L — SIGNIFICANT CHANGE UP (ref 3.5–5.3)
POTASSIUM SERPL-SCNC: 4.5 MMOL/L — SIGNIFICANT CHANGE UP (ref 3.5–5.3)
RBC # BLD: 2.6 M/UL — LOW (ref 4.2–5.8)
RBC # FLD: 12.8 % — SIGNIFICANT CHANGE UP (ref 10.3–14.5)
SARS-COV-2 RNA SPEC QL NAA+PROBE: SIGNIFICANT CHANGE UP
SODIUM SERPL-SCNC: 131 MMOL/L — LOW (ref 135–145)
WBC # BLD: 10.98 K/UL — HIGH (ref 3.8–10.5)
WBC # FLD AUTO: 10.98 K/UL — HIGH (ref 3.8–10.5)

## 2022-07-09 PROCEDURE — 99232 SBSQ HOSP IP/OBS MODERATE 35: CPT

## 2022-07-09 RX ADMIN — Medication 7 UNIT(S): at 16:06

## 2022-07-09 RX ADMIN — Medication 7 UNIT(S): at 08:08

## 2022-07-09 RX ADMIN — ONDANSETRON 4 MILLIGRAM(S): 8 TABLET, FILM COATED ORAL at 16:45

## 2022-07-09 RX ADMIN — OXYCODONE HYDROCHLORIDE 10 MILLIGRAM(S): 5 TABLET ORAL at 00:00

## 2022-07-09 RX ADMIN — MIDODRINE HYDROCHLORIDE 15 MILLIGRAM(S): 2.5 TABLET ORAL at 12:18

## 2022-07-09 RX ADMIN — Medication 325 MILLIGRAM(S): at 06:11

## 2022-07-09 RX ADMIN — OXYCODONE HYDROCHLORIDE 5 MILLIGRAM(S): 5 TABLET ORAL at 22:23

## 2022-07-09 RX ADMIN — Medication 50 MICROGRAM(S): at 06:11

## 2022-07-09 RX ADMIN — Medication 325 MILLIGRAM(S): at 16:04

## 2022-07-09 RX ADMIN — INSULIN GLARGINE 20 UNIT(S): 100 INJECTION, SOLUTION SUBCUTANEOUS at 08:57

## 2022-07-09 RX ADMIN — Medication 4: at 12:17

## 2022-07-09 RX ADMIN — Medication 1 MILLIGRAM(S): at 08:06

## 2022-07-09 RX ADMIN — MIDODRINE HYDROCHLORIDE 15 MILLIGRAM(S): 2.5 TABLET ORAL at 06:08

## 2022-07-09 RX ADMIN — AMIODARONE HYDROCHLORIDE 100 MILLIGRAM(S): 400 TABLET ORAL at 06:09

## 2022-07-09 RX ADMIN — OXYCODONE HYDROCHLORIDE 10 MILLIGRAM(S): 5 TABLET ORAL at 10:14

## 2022-07-09 RX ADMIN — OXYCODONE HYDROCHLORIDE 10 MILLIGRAM(S): 5 TABLET ORAL at 13:04

## 2022-07-09 RX ADMIN — ATORVASTATIN CALCIUM 20 MILLIGRAM(S): 80 TABLET, FILM COATED ORAL at 22:24

## 2022-07-09 RX ADMIN — Medication 7 UNIT(S): at 12:17

## 2022-07-09 RX ADMIN — PREGABALIN 1000 MICROGRAM(S): 225 CAPSULE ORAL at 08:07

## 2022-07-09 RX ADMIN — Medication 8: at 08:05

## 2022-07-09 RX ADMIN — Medication 81 MILLIGRAM(S): at 08:06

## 2022-07-09 RX ADMIN — ENOXAPARIN SODIUM 40 MILLIGRAM(S): 100 INJECTION SUBCUTANEOUS at 12:18

## 2022-07-09 NOTE — PROGRESS NOTE ADULT - ASSESSMENT
52 yo male with history of diabetes,  A. fib, depression, hypotension, HLD, hypothyroid initially presented to [facility] with complaints of pain in his left hip. Patient reports that he was trying to get out the pool and then he misplaced his foot and fell down on to his left hip. Patient complaints of pain at hip site. No other complaints. Of note, patient had a hip fracture of his left hip in October 2021 and was repaired at [facility]. Patient was transferred to [facility] for evaluation. Orthopedist requested admission to medicine for medical management.    *Left femur fracture s/p ORIF POD4  Ortho consult noted   Pain control  WBAT LLE   cont PT  pending acute rehab   Wound vac removal prior to discharge per ortho     *Orthostatic hypotension   home dose 10mg   increased to 15mg 7/8  Trendelenburg as needed      *Anemia, postop acute blood loss  transfuse if hb<7 or symptomatic.   recheck CBC prior to discharge    *DM - uncontrolled   atypical insulin regimen at home  ISS, monitor BS   A1C -9    *Hyponatremia  Corrected sodium is 132  Monitor BMP    *CKD3  Monitor Renal function  avoid nephrotoxics. monitor intake and output.     *Afib  S/p ablation  C/w amiodarone  Not on AC    *Hypothyroid  C/w Synthroid    *HLD  Atorvastatin    DVT prophylaxis: Lovenox x 4 weeks  pending acute rehab

## 2022-07-09 NOTE — PROGRESS NOTE ADULT - SUBJECTIVE AND OBJECTIVE BOX
HPI  Pt is a 52yo M admitted to [facility] for left hip fx s/p ORIF.   Pt was seen and examined at bedside. No overnight complaints.     Vital Signs Last 24 Hrs  T(C): 37 (09 Jul 2022 09:26), Max: 37.1 (09 Jul 2022 05:17)  T(F): 98.6 (09 Jul 2022 09:26), Max: 98.7 (09 Jul 2022 05:17)  HR: 67 (09 Jul 2022 09:26) (67 - 103)  BP: 119/75 (09 Jul 2022 09:26) (91/61 - 127/79)  BP(mean): --  RR: 17 (09 Jul 2022 09:26) (17 - 18)  SpO2: 97% (09 Jul 2022 09:26) (95% - 98%)    Parameters below as of 09 Jul 2022 09:26  Patient On (Oxygen Delivery Method): room air        I&O's Summary    08 Jul 2022 07:01  -  09 Jul 2022 07:00  --------------------------------------------------------  IN: 0 mL / OUT: 2225 mL / NET: -2225 mL        CAPILLARY BLOOD GLUCOSE      POCT Blood Glucose.: 320 mg/dL (09 Jul 2022 08:04)  POCT Blood Glucose.: 324 mg/dL (08 Jul 2022 22:46)  POCT Blood Glucose.: 80 mg/dL (08 Jul 2022 17:39)  POCT Blood Glucose.: 213 mg/dL (08 Jul 2022 12:16)      PHYSICAL EXAM:  Constitutional: NAD, awake    HEENT: PERR, EOMI,   Neck: Soft and supple,    Respiratory: Breath sounds are clear bilaterally,   Cardiovascular: S1 and S2,    Gastrointestinal: Bowel Sounds present, soft, nontender,    Extremities: left lower ext +2   Vascular: 2+ peripheral pulses  Neurological: A/O x 3,    Musculoskeletal: 5/5 strength b/l upper and lower extremities  Skin: left hip dressed, wound vac noted       MEDICATIONS:  MEDICATIONS  (STANDING):  aMIOdarone    Tablet 100 milliGRAM(s) Oral daily  aspirin enteric coated 81 milliGRAM(s) Oral daily  atorvastatin 20 milliGRAM(s) Oral at bedtime  cyanocobalamin 1000 MICROGram(s) Oral daily  dextrose 5%. 1000 milliLiter(s) (50 mL/Hr) IV Continuous <Continuous>  dextrose 5%. 1000 milliLiter(s) (100 mL/Hr) IV Continuous <Continuous>  dextrose 50% Injectable 25 Gram(s) IV Push once  dextrose 50% Injectable 25 Gram(s) IV Push once  dextrose 50% Injectable 12.5 Gram(s) IV Push once  enoxaparin Injectable 40 milliGRAM(s) SubCutaneous every 24 hours  ferrous    sulfate 325 milliGRAM(s) Oral two times a day  folic acid 1 milliGRAM(s) Oral daily  glucagon  Injectable 1 milliGRAM(s) IntraMuscular once  insulin glargine Injectable (LANTUS) 20 Unit(s) SubCutaneous every morning  insulin lispro (ADMELOG) corrective regimen sliding scale   SubCutaneous Before meals and at bedtime  insulin lispro Injectable (ADMELOG) 7 Unit(s) SubCutaneous three times a day before meals  levothyroxine 50 MICROGram(s) Oral daily  midodrine. 15 milliGRAM(s) Oral every 8 hours      LABS: All Labs Reviewed:                        8.2    10.98 )-----------( 289      ( 09 Jul 2022 07:17 )             24.4     07-09    131<L>  |  95<L>  |  34.2<H>  ----------------------------<  287<H>  4.5   |  23.0  |  1.68<H>    Ca    8.7      09 Jul 2022 07:17  Phos  3.6     07-09  Mg     1.7     07-09    TPro  5.9<L>  /  Alb  2.6<L>  /  TBili  0.7  /  DBili  x   /  AST  28  /  ALT  17  /  AlkPhos  229<H>  07-08          Blood Culture: 07-05 @ 23:51  Organism --  Gram Stain Blood -- Gram Stain --  Specimen Source .Surgical Swab femoral neck #1 (swab)  Culture-Blood --    07-05 @ 23:38  Organism --  Gram Stain Blood -- Gram Stain --  Specimen Source .Other femoral neck #2 (swab)  Culture-Blood --        RADIOLOGY/EKG:    DVT PPX:    ADVANCED DIRECTIVE:    DISPOSITION:

## 2022-07-09 NOTE — PROGRESS NOTE ADULT - SUBJECTIVE AND OBJECTIVE BOX
ELZBIETA CÁRDENAS    [id number]    Patient seen and examined status post left femur IM nail replacement, POD # 4.  The patient's pain is controlled using the prescribed pain medications. Ambulating short distances in room. Denies nausea, vomiting, chest pain, shortness of breath, dizziness abdominal pain. No new complaints.    Vital Signs Last 24 Hrs  T(C): 37.1 (09 Jul 2022 05:17), Max: 37.1 (09 Jul 2022 05:17)  T(F): 98.7 (09 Jul 2022 05:17), Max: 98.7 (09 Jul 2022 05:17)  HR: 70 (09 Jul 2022 05:17) (70 - 103)  BP: 121/76 (09 Jul 2022 05:17) (91/61 - 127/79)  BP(mean): --  RR: 18 (09 Jul 2022 05:17) (17 - 18)  SpO2: 96% (09 Jul 2022 05:17) (95% - 98%)    Parameters below as of 09 Jul 2022 05:17  Patient On (Oxygen Delivery Method): room air                              8.2    10.98 )-----------( 289      ( 09 Jul 2022 07:17 )             24.4     07-09    131<L>  |  95<L>  |  34.2<H>  ----------------------------<  287<H>  4.5   |  23.0  |  1.68<H>    Ca    8.7      09 Jul 2022 07:17  Phos  3.6     07-09  Mg     1.7     07-09    TPro  5.9<L>  /  Alb  2.6<L>  /  TBili  0.7  /  DBili  x   /  AST  28  /  ALT  17  /  AlkPhos  229<H>  07-08      MEDICATIONS  (STANDING):  aMIOdarone    Tablet 100 milliGRAM(s) Oral daily  aspirin enteric coated 81 milliGRAM(s) Oral daily  atorvastatin 20 milliGRAM(s) Oral at bedtime  cyanocobalamin 1000 MICROGram(s) Oral daily  dextrose 5%. 1000 milliLiter(s) (50 mL/Hr) IV Continuous <Continuous>  dextrose 5%. 1000 milliLiter(s) (100 mL/Hr) IV Continuous <Continuous>  dextrose 50% Injectable 25 Gram(s) IV Push once  dextrose 50% Injectable 25 Gram(s) IV Push once  dextrose 50% Injectable 12.5 Gram(s) IV Push once  enoxaparin Injectable 40 milliGRAM(s) SubCutaneous every 24 hours  ferrous    sulfate 325 milliGRAM(s) Oral two times a day  folic acid 1 milliGRAM(s) Oral daily  glucagon  Injectable 1 milliGRAM(s) IntraMuscular once  insulin glargine Injectable (LANTUS) 20 Unit(s) SubCutaneous every morning  insulin lispro (ADMELOG) corrective regimen sliding scale   SubCutaneous Before meals and at bedtime  insulin lispro Injectable (ADMELOG) 7 Unit(s) SubCutaneous three times a day before meals  levothyroxine 50 MICROGram(s) Oral daily  midodrine. 15 milliGRAM(s) Oral every 8 hours    MEDICATIONS  (PRN):  bisacodyl Suppository 10 milliGRAM(s) Rectal daily PRN If no bowel movement  dextrose Oral Gel 15 Gram(s) Oral once PRN Blood Glucose LESS THAN 70 milliGRAM(s)/deciliter  HYDROmorphone   Tablet 4 milliGRAM(s) Oral every 4 hours PRN Severe Pain (7 - 10)  HYDROmorphone  Injectable 0.5 milliGRAM(s) IV Push every 3 hours PRN Breakthrough  magnesium hydroxide Suspension 30 milliLiter(s) Oral daily PRN Constipation  ondansetron Injectable 4 milliGRAM(s) IV Push every 6 hours PRN Nausea and/or Vomiting  oxyCODONE    IR 10 milliGRAM(s) Oral every 3 hours PRN Moderate Pain (4 - 6)  oxyCODONE    IR 5 milliGRAM(s) Oral every 3 hours PRN Mild Pain (1 - 3)    OR cultures: no growth to date    Physical exam: NAD, resting comfortably  LLE:  Prevena vac dressing is clean, dry and intact. Functioning  well, no fluid in noted canister. No surrounding erythema, blistering, or ecchymosis noted. The calf is supple nontender. Passive range of motion is acceptable to due postoperative pain. No calf tenderness. Sensation to light touch is grossly intact distally. Motor function distally is 5/5. No foot drop. 2+ dorsalis pedis pulse. Capillary refill is less than 2 seconds. No cyanosis.    Primary Orthopedic Assessment:  • status post left femur IM nail replacement, POD # 4        Plan:   • DVT prophylaxis: Lovenox 40mg qd, use of compression devices and ankle pumps  • Continue physical therapy  • Weightbearing as tolerated of the left lower extremity with assistance of a walker  • Incentive spirometry encouraged  • Pain control as clinically indicated  • follow up OR cx  . remove prevena vac dressing and apply dry dressing prior to discharge  . Discharge planning – anticipated discharge is subacute rehabilitation

## 2022-07-10 LAB
ANION GAP SERPL CALC-SCNC: 15 MMOL/L — SIGNIFICANT CHANGE UP (ref 5–17)
BUN SERPL-MCNC: 26.9 MG/DL — HIGH (ref 8–20)
CALCIUM SERPL-MCNC: 8.8 MG/DL — SIGNIFICANT CHANGE UP (ref 8.6–10.2)
CHLORIDE SERPL-SCNC: 91 MMOL/L — LOW (ref 98–107)
CO2 SERPL-SCNC: 23 MMOL/L — SIGNIFICANT CHANGE UP (ref 22–29)
CREAT SERPL-MCNC: 1.54 MG/DL — HIGH (ref 0.5–1.3)
CULTURE RESULTS: SIGNIFICANT CHANGE UP
CULTURE RESULTS: SIGNIFICANT CHANGE UP
EGFR: 54 ML/MIN/1.73M2 — LOW
GLUCOSE BLDC GLUCOMTR-MCNC: 123 MG/DL — HIGH (ref 70–99)
GLUCOSE BLDC GLUCOMTR-MCNC: 202 MG/DL — HIGH (ref 70–99)
GLUCOSE BLDC GLUCOMTR-MCNC: 239 MG/DL — HIGH (ref 70–99)
GLUCOSE BLDC GLUCOMTR-MCNC: 273 MG/DL — HIGH (ref 70–99)
GLUCOSE BLDC GLUCOMTR-MCNC: 66 MG/DL — LOW (ref 70–99)
GLUCOSE SERPL-MCNC: 224 MG/DL — HIGH (ref 70–99)
HCT VFR BLD CALC: 26.8 % — LOW (ref 39–50)
HGB BLD-MCNC: 8.5 G/DL — LOW (ref 13–17)
MCHC RBC-ENTMCNC: 30.2 PG — SIGNIFICANT CHANGE UP (ref 27–34)
MCHC RBC-ENTMCNC: 31.7 GM/DL — LOW (ref 32–36)
MCV RBC AUTO: 95.4 FL — SIGNIFICANT CHANGE UP (ref 80–100)
PLATELET # BLD AUTO: 293 K/UL — SIGNIFICANT CHANGE UP (ref 150–400)
POTASSIUM SERPL-MCNC: 4.4 MMOL/L — SIGNIFICANT CHANGE UP (ref 3.5–5.3)
POTASSIUM SERPL-SCNC: 4.4 MMOL/L — SIGNIFICANT CHANGE UP (ref 3.5–5.3)
RBC # BLD: 2.81 M/UL — LOW (ref 4.2–5.8)
RBC # FLD: 12.6 % — SIGNIFICANT CHANGE UP (ref 10.3–14.5)
SODIUM SERPL-SCNC: 129 MMOL/L — LOW (ref 135–145)
SPECIMEN SOURCE: SIGNIFICANT CHANGE UP
SPECIMEN SOURCE: SIGNIFICANT CHANGE UP
WBC # BLD: 12.04 K/UL — HIGH (ref 3.8–10.5)
WBC # FLD AUTO: 12.04 K/UL — HIGH (ref 3.8–10.5)

## 2022-07-10 PROCEDURE — 99232 SBSQ HOSP IP/OBS MODERATE 35: CPT

## 2022-07-10 RX ORDER — DEXTROSE 50 % IN WATER 50 %
15 SYRINGE (ML) INTRAVENOUS ONCE
Refills: 0 | Status: COMPLETED | OUTPATIENT
Start: 2022-07-10 | End: 2022-07-10

## 2022-07-10 RX ADMIN — Medication 4: at 23:00

## 2022-07-10 RX ADMIN — Medication 325 MILLIGRAM(S): at 16:34

## 2022-07-10 RX ADMIN — PREGABALIN 1000 MICROGRAM(S): 225 CAPSULE ORAL at 08:07

## 2022-07-10 RX ADMIN — Medication 1 MILLIGRAM(S): at 08:07

## 2022-07-10 RX ADMIN — Medication 4: at 12:32

## 2022-07-10 RX ADMIN — Medication 325 MILLIGRAM(S): at 06:12

## 2022-07-10 RX ADMIN — INSULIN GLARGINE 20 UNIT(S): 100 INJECTION, SOLUTION SUBCUTANEOUS at 08:07

## 2022-07-10 RX ADMIN — Medication 81 MILLIGRAM(S): at 08:06

## 2022-07-10 RX ADMIN — Medication 7 UNIT(S): at 12:32

## 2022-07-10 RX ADMIN — Medication 15 GRAM(S): at 15:32

## 2022-07-10 RX ADMIN — ENOXAPARIN SODIUM 40 MILLIGRAM(S): 100 INJECTION SUBCUTANEOUS at 13:30

## 2022-07-10 RX ADMIN — OXYCODONE HYDROCHLORIDE 10 MILLIGRAM(S): 5 TABLET ORAL at 22:58

## 2022-07-10 RX ADMIN — OXYCODONE HYDROCHLORIDE 10 MILLIGRAM(S): 5 TABLET ORAL at 09:15

## 2022-07-10 RX ADMIN — AMIODARONE HYDROCHLORIDE 100 MILLIGRAM(S): 400 TABLET ORAL at 08:07

## 2022-07-10 RX ADMIN — Medication 6: at 08:08

## 2022-07-10 RX ADMIN — Medication 7 UNIT(S): at 08:34

## 2022-07-10 RX ADMIN — OXYCODONE HYDROCHLORIDE 10 MILLIGRAM(S): 5 TABLET ORAL at 08:14

## 2022-07-10 RX ADMIN — Medication 50 MICROGRAM(S): at 06:12

## 2022-07-10 RX ADMIN — ATORVASTATIN CALCIUM 20 MILLIGRAM(S): 80 TABLET, FILM COATED ORAL at 22:58

## 2022-07-10 RX ADMIN — MIDODRINE HYDROCHLORIDE 15 MILLIGRAM(S): 2.5 TABLET ORAL at 06:12

## 2022-07-10 RX ADMIN — OXYCODONE HYDROCHLORIDE 10 MILLIGRAM(S): 5 TABLET ORAL at 23:30

## 2022-07-10 RX ADMIN — MIDODRINE HYDROCHLORIDE 15 MILLIGRAM(S): 2.5 TABLET ORAL at 22:58

## 2022-07-10 NOTE — PROGRESS NOTE ADULT - SUBJECTIVE AND OBJECTIVE BOX
HPI  Pt is a 52yo M admitted to [facility] for left hip fx s/p ORIF.   Pt was seen and examined at bedside. No overnight complaints.     Vital Signs Last 24 Hrs  T(C): 36.8 (10 Jul 2022 10:00), Max: 36.9 (09 Jul 2022 17:00)  T(F): 98.2 (10 Jul 2022 10:00), Max: 98.5 (09 Jul 2022 17:00)  HR: 78 (10 Jul 2022 10:00) (73 - 84)  BP: 149/79 (10 Jul 2022 13:31) (102/63 - 149/79)  BP(mean): --  RR: 17 (10 Jul 2022 10:00) (17 - 18)  SpO2: 97% (10 Jul 2022 10:00) (96% - 97%)    Parameters below as of 10 Jul 2022 10:00  Patient On (Oxygen Delivery Method): room air        I&O's Summary    09 Jul 2022 07:01  -  10 Jul 2022 07:00  --------------------------------------------------------  IN: 0 mL / OUT: 1300 mL / NET: -1300 mL        CAPILLARY BLOOD GLUCOSE      POCT Blood Glucose.: 202 mg/dL (10 Jul 2022 12:29)  POCT Blood Glucose.: 273 mg/dL (10 Jul 2022 08:05)  POCT Blood Glucose.: 166 mg/dL (09 Jul 2022 22:13)  POCT Blood Glucose.: 91 mg/dL (09 Jul 2022 16:01)      PHYSICAL EXAM:    Constitutional: NAD, awake    HEENT: PERR, EOMI,   Neck: Soft and supple,    Respiratory: Breath sounds are clear bilaterally,   Cardiovascular: S1 and S2,    Gastrointestinal: Bowel Sounds present, soft, nontender,    Extremities: left lower ext +2   Vascular: 2+ peripheral pulses  Neurological: A/O x 3,    Musculoskeletal: 5/5 strength b/l upper and lower extremities  Skin: left hip dressed, wound vac noted     MEDICATIONS:  MEDICATIONS  (STANDING):  aMIOdarone    Tablet 100 milliGRAM(s) Oral daily  aspirin enteric coated 81 milliGRAM(s) Oral daily  atorvastatin 20 milliGRAM(s) Oral at bedtime  cyanocobalamin 1000 MICROGram(s) Oral daily  dextrose 5%. 1000 milliLiter(s) (100 mL/Hr) IV Continuous <Continuous>  dextrose 5%. 1000 milliLiter(s) (50 mL/Hr) IV Continuous <Continuous>  dextrose 50% Injectable 25 Gram(s) IV Push once  dextrose 50% Injectable 12.5 Gram(s) IV Push once  dextrose 50% Injectable 25 Gram(s) IV Push once  enoxaparin Injectable 40 milliGRAM(s) SubCutaneous every 24 hours  ferrous    sulfate 325 milliGRAM(s) Oral two times a day  folic acid 1 milliGRAM(s) Oral daily  glucagon  Injectable 1 milliGRAM(s) IntraMuscular once  insulin glargine Injectable (LANTUS) 20 Unit(s) SubCutaneous every morning  insulin lispro (ADMELOG) corrective regimen sliding scale   SubCutaneous Before meals and at bedtime  insulin lispro Injectable (ADMELOG) 7 Unit(s) SubCutaneous three times a day before meals  levothyroxine 50 MICROGram(s) Oral daily  midodrine. 15 milliGRAM(s) Oral every 8 hours      LABS: All Labs Reviewed:                        8.5    12.04 )-----------( 293      ( 10 Jul 2022 06:03 )             26.8     07-10    129<L>  |  91<L>  |  26.9<H>  ----------------------------<  224<H>  4.4   |  23.0  |  1.54<H>    Ca    8.8      10 Jul 2022 06:03  Phos  3.6     07-09  Mg     1.7     07-09            Blood Culture: 07-05 @ 23:51  Organism --  Gram Stain Blood -- Gram Stain --  Specimen Source .Surgical Swab femoral neck #1 (swab)  Culture-Blood --    07-05 @ 23:38  Organism --  Gram Stain Blood -- Gram Stain --  Specimen Source .Other femoral neck #2 (swab)  Culture-Blood --        RADIOLOGY/EKG:    DVT PPX:    ADVANCED DIRECTIVE:    DISPOSITION:

## 2022-07-10 NOTE — PROVIDER CONTACT NOTE (HYPOGLYCEMIA EVENT) - NS PROVIDER CONTACT BACKGROUND-HYPO
Age: 51y    Gender: Male    POCT Blood Glucose:  123 mg/dL (07-10-22 @ 16:06)  66 mg/dL (07-10-22 @ 15:23)  202 mg/dL (07-10-22 @ 12:29)  273 mg/dL (07-10-22 @ 08:05)  166 mg/dL (07-09-22 @ 22:13)      eMAR:atorvastatin   20 milliGRAM(s) Oral (07-09-22 @ 22:24)    dextrose Oral Gel   15 Gram(s) Oral (07-10-22 @ 15:32)    insulin glargine Injectable (LANTUS)   20 Unit(s) SubCutaneous (07-10-22 @ 08:07)    insulin lispro (ADMELOG) corrective regimen sliding scale   4 Unit(s) SubCutaneous (07-10-22 @ 12:32)   6 Unit(s) SubCutaneous (07-10-22 @ 08:08)    insulin lispro Injectable (ADMELOG)   7 Unit(s) SubCutaneous (07-10-22 @ 12:32)   7 Unit(s) SubCutaneous (07-10-22 @ 08:34)    levothyroxine   50 MICROGram(s) Oral (07-10-22 @ 06:12)

## 2022-07-10 NOTE — PROGRESS NOTE ADULT - SUBJECTIVE AND OBJECTIVE BOX
Pt Name: ELZBIETA CÁRDENAS  MRN: [id number]    Procedure:  Surgeon:     Patient is a 51y Male being followed for left femur IM nail replacement, POD#5. Patient seen and examined at bedside. Patient is doing well, notes some improvement to ambulatory status. Pain is controlled with the prescribed medication. Denies CP, SOB, N/V, numbness/tingling. No other orthopedic complaints.        Vital Signs Last 24 Hrs  T(C): 36.6 (10 Jul 2022 04:03), Max: 37 (09 Jul 2022 09:26)  T(F): 97.8 (10 Jul 2022 04:03), Max: 98.6 (09 Jul 2022 09:26)  HR: 84 (10 Jul 2022 04:03) (67 - 84)  BP: 102/63 (10 Jul 2022 04:03) (102/63 - 148/72)  BP(mean): --  RR: 18 (10 Jul 2022 04:03) (17 - 18)  SpO2: 97% (10 Jul 2022 04:03) (96% - 97%)    Parameters below as of 10 Jul 2022 04:03  Patient On (Oxygen Delivery Method): room air      Daily     Daily                           8.5    12.04 )-----------( 293      ( 10 Jul 2022 06:03 )             26.8     07-10    129<L>  |  91<L>  |  26.9<H>  ----------------------------<  224<H>  4.4   |  23.0  |  1.54<H>    Ca    8.8      10 Jul 2022 06:03  Phos  3.6     07-09  Mg     1.7     07-09        PHYSICAL EXAM:    Appearance: Alert, responsive, in no acute distress.    Musculoskeletal:       Left Lower Extremity: Prevena dressing intact. Dressing removed. Staples intact. No active drainage or bleeding. New dry dressing placed. No abrasions or erythema. No bleeding. Sensation is grossly intact to light touch. + dorsi/plantarflexion/EHL/FHL. DP pulses 2+. Cap refill < 2 seconds. No cyanosis. No signs of venous insufficiency or stasis.         A/P:  Pt is a  51y Male s/p left femur IM nail replacement, POD#5    PLAN:   * Pain control  * DVTp  * Weight Bearing Status: WBAT LLE  * Continue care as per primary team  * pending d/c to [facility] tomorrow

## 2022-07-10 NOTE — PROGRESS NOTE ADULT - ASSESSMENT
Pt is 50yo M w history of diabetes,  A. fib, depression, hypotension, HLD, hypothyroid initially presented to [facility] with complaints of pain in his left hip. Patient reports that he was trying to get out the pool and then he misplaced his foot and fell down on to his left hip. Patient complaints of pain at hip site. No other complaints. Of note, patient had a hip fracture of his left hip in October 2021 and was repaired at [facility]. Patient was transferred to [facility] for evaluation. Orthopedist requested admission to medicine for medical management.    *Left femur fracture s/p ORIF POD5  Ortho consult noted   Pain control  WBAT LLE   cont PT  Prevena vac removed today   pending acute rehab tomorrow       *Orthostatic hypotension   home dose 10mg   increased to 15mg 7/8  Trendelenburg as needed      *Anemia, postop acute blood loss  transfuse if hb<7 or symptomatic.   recheck CBC prior to discharge    *DM - uncontrolled   atypical insulin regimen at home  ISS, monitor BS   A1C -9    *Hyponatremia  Corrected sodium is 132  Monitor BMP    *CKD3  Monitor Renal function  avoid nephrotoxics. monitor intake and output.     *Afib  S/p ablation  C/w amiodarone  Not on AC    *Hypothyroid  C/w Synthroid    *HLD  Atorvastatin    DVT prophylaxis: Lovenox x 4 weeks  pending acute rehab

## 2022-07-11 LAB
ALBUMIN SERPL ELPH-MCNC: 2.7 G/DL — LOW (ref 3.3–5.2)
ALP SERPL-CCNC: 276 U/L — HIGH (ref 40–120)
ALT FLD-CCNC: 14 U/L — SIGNIFICANT CHANGE UP
ANION GAP SERPL CALC-SCNC: 10 MMOL/L — SIGNIFICANT CHANGE UP (ref 5–17)
ANION GAP SERPL CALC-SCNC: 12 MMOL/L — SIGNIFICANT CHANGE UP (ref 5–17)
AST SERPL-CCNC: 21 U/L — SIGNIFICANT CHANGE UP
BILIRUB SERPL-MCNC: 0.9 MG/DL — SIGNIFICANT CHANGE UP (ref 0.4–2)
BUN SERPL-MCNC: 22.5 MG/DL — HIGH (ref 8–20)
BUN SERPL-MCNC: 24.8 MG/DL — HIGH (ref 8–20)
CALCIUM SERPL-MCNC: 8.3 MG/DL — LOW (ref 8.6–10.2)
CALCIUM SERPL-MCNC: 8.6 MG/DL — SIGNIFICANT CHANGE UP (ref 8.6–10.2)
CHLORIDE SERPL-SCNC: 94 MMOL/L — LOW (ref 98–107)
CHLORIDE SERPL-SCNC: 94 MMOL/L — LOW (ref 98–107)
CO2 SERPL-SCNC: 24 MMOL/L — SIGNIFICANT CHANGE UP (ref 22–29)
CO2 SERPL-SCNC: 25 MMOL/L — SIGNIFICANT CHANGE UP (ref 22–29)
CREAT SERPL-MCNC: 1.48 MG/DL — HIGH (ref 0.5–1.3)
CREAT SERPL-MCNC: 1.55 MG/DL — HIGH (ref 0.5–1.3)
EGFR: 53 ML/MIN/1.73M2 — LOW
EGFR: 56 ML/MIN/1.73M2 — LOW
GLUCOSE BLDC GLUCOMTR-MCNC: 176 MG/DL — HIGH (ref 70–99)
GLUCOSE BLDC GLUCOMTR-MCNC: 227 MG/DL — HIGH (ref 70–99)
GLUCOSE BLDC GLUCOMTR-MCNC: 231 MG/DL — HIGH (ref 70–99)
GLUCOSE BLDC GLUCOMTR-MCNC: 258 MG/DL — HIGH (ref 70–99)
GLUCOSE BLDC GLUCOMTR-MCNC: 340 MG/DL — HIGH (ref 70–99)
GLUCOSE SERPL-MCNC: 266 MG/DL — HIGH (ref 70–99)
GLUCOSE SERPL-MCNC: 324 MG/DL — HIGH (ref 70–99)
HCT VFR BLD CALC: 27.3 % — LOW (ref 39–50)
HGB BLD-MCNC: 8.9 G/DL — LOW (ref 13–17)
MCHC RBC-ENTMCNC: 30 PG — SIGNIFICANT CHANGE UP (ref 27–34)
MCHC RBC-ENTMCNC: 32.6 GM/DL — SIGNIFICANT CHANGE UP (ref 32–36)
MCV RBC AUTO: 91.9 FL — SIGNIFICANT CHANGE UP (ref 80–100)
PLATELET # BLD AUTO: 320 K/UL — SIGNIFICANT CHANGE UP (ref 150–400)
POTASSIUM SERPL-MCNC: 4.5 MMOL/L — SIGNIFICANT CHANGE UP (ref 3.5–5.3)
POTASSIUM SERPL-MCNC: 5 MMOL/L — SIGNIFICANT CHANGE UP (ref 3.5–5.3)
POTASSIUM SERPL-SCNC: 4.5 MMOL/L — SIGNIFICANT CHANGE UP (ref 3.5–5.3)
POTASSIUM SERPL-SCNC: 5 MMOL/L — SIGNIFICANT CHANGE UP (ref 3.5–5.3)
PROT SERPL-MCNC: 6 G/DL — LOW (ref 6.6–8.7)
RBC # BLD: 2.97 M/UL — LOW (ref 4.2–5.8)
RBC # FLD: 12.8 % — SIGNIFICANT CHANGE UP (ref 10.3–14.5)
SODIUM SERPL-SCNC: 129 MMOL/L — LOW (ref 135–145)
SODIUM SERPL-SCNC: 130 MMOL/L — LOW (ref 135–145)
WBC # BLD: 12.81 K/UL — HIGH (ref 3.8–10.5)
WBC # FLD AUTO: 12.81 K/UL — HIGH (ref 3.8–10.5)

## 2022-07-11 PROCEDURE — 99233 SBSQ HOSP IP/OBS HIGH 50: CPT

## 2022-07-11 PROCEDURE — 99232 SBSQ HOSP IP/OBS MODERATE 35: CPT

## 2022-07-11 RX ORDER — SODIUM CHLORIDE 9 MG/ML
1000 INJECTION INTRAMUSCULAR; INTRAVENOUS; SUBCUTANEOUS
Refills: 0 | Status: DISCONTINUED | OUTPATIENT
Start: 2022-07-11 | End: 2022-07-11

## 2022-07-11 RX ADMIN — Medication 4: at 11:47

## 2022-07-11 RX ADMIN — MIDODRINE HYDROCHLORIDE 15 MILLIGRAM(S): 2.5 TABLET ORAL at 06:23

## 2022-07-11 RX ADMIN — Medication 81 MILLIGRAM(S): at 11:47

## 2022-07-11 RX ADMIN — OXYCODONE HYDROCHLORIDE 10 MILLIGRAM(S): 5 TABLET ORAL at 09:37

## 2022-07-11 RX ADMIN — OXYCODONE HYDROCHLORIDE 10 MILLIGRAM(S): 5 TABLET ORAL at 10:37

## 2022-07-11 RX ADMIN — Medication 325 MILLIGRAM(S): at 17:37

## 2022-07-11 RX ADMIN — MIDODRINE HYDROCHLORIDE 15 MILLIGRAM(S): 2.5 TABLET ORAL at 22:29

## 2022-07-11 RX ADMIN — Medication 8: at 08:05

## 2022-07-11 RX ADMIN — ENOXAPARIN SODIUM 40 MILLIGRAM(S): 100 INJECTION SUBCUTANEOUS at 13:08

## 2022-07-11 RX ADMIN — ATORVASTATIN CALCIUM 20 MILLIGRAM(S): 80 TABLET, FILM COATED ORAL at 22:29

## 2022-07-11 RX ADMIN — Medication 1 MILLIGRAM(S): at 11:47

## 2022-07-11 RX ADMIN — Medication 6: at 17:38

## 2022-07-11 RX ADMIN — Medication 50 MICROGRAM(S): at 06:24

## 2022-07-11 RX ADMIN — Medication 325 MILLIGRAM(S): at 06:24

## 2022-07-11 RX ADMIN — AMIODARONE HYDROCHLORIDE 100 MILLIGRAM(S): 400 TABLET ORAL at 06:24

## 2022-07-11 RX ADMIN — PREGABALIN 1000 MICROGRAM(S): 225 CAPSULE ORAL at 11:47

## 2022-07-11 RX ADMIN — SODIUM CHLORIDE 75 MILLILITER(S): 9 INJECTION INTRAMUSCULAR; INTRAVENOUS; SUBCUTANEOUS at 03:00

## 2022-07-11 RX ADMIN — MIDODRINE HYDROCHLORIDE 15 MILLIGRAM(S): 2.5 TABLET ORAL at 13:08

## 2022-07-11 RX ADMIN — Medication 2: at 22:32

## 2022-07-11 RX ADMIN — INSULIN GLARGINE 20 UNIT(S): 100 INJECTION, SOLUTION SUBCUTANEOUS at 09:03

## 2022-07-11 NOTE — RAPID RESPONSE TEAM SUMMARY - NSADDTLFINDINGSRRT_GEN_ALL_CORE
BS- 227  NSR on monitor  VSS- 166/85- 81- 18- 98.0-99% RA  Gen: Pt is A+O x4, lying in bed, in NAD  S1S2 ausc  Lungs- clear bilat  Abd- soft, nontender  Ext- L. wound dressing intact

## 2022-07-11 NOTE — RAPID RESPONSE TEAM SUMMARY - NSOTHERINTERVENTIONSRRT_GEN_ALL_CORE
>Vasovagal event, hx orthostatic hypotension  -placed on cardiac monitor  -no OOB  -CBC, CMP pending  -call  PA if clinical status changes

## 2022-07-11 NOTE — PROGRESS NOTE ADULT - SUBJECTIVE AND OBJECTIVE BOX
Patient feeling much better and hopes he can demonstrate progress today.  He is not feeling nauseated.   Reports he had no weekend therapy.   Encouraged patient to take PO pain meds now while eating breakfast for PT.     REVIEW OF SYSTEMS  Constitutional - No fever,  No fatigue  Neurological - No headaches, No memory loss, +loss of strength   Musculoskeletal - +joint pain, +muscle pain  Psychiatric - No depression, No anxiety    FUNCTIONAL PROGRESS  7/11 PT  Bed Mobility  Bed Mobility Training Sit-to-Supine: minimum assist (75% patient effort);  1 person assist  Bed Mobility Training Supine-to-Sit: minimum assist (75% patient effort);  1 person assist  Bed Mobility Training Limitations: decreased ability to use legs for bridging/pushing;  decreased strength    Sit-Stand Transfer Training  Sit-to-Stand Transfer Training Treatment not Performed: pt with +orthostatic BP and symptomatic in sitting.   Sit-to-Stand Transfer Training Symptoms Noted During/After Treatment: significant change in vital signs    Gait Training  Gait Training Treatment not Performed: pt with +orthostatic BP and symptomatic in sitting.   Gait Training Symptoms Noted During/After Treatment: significant change in vital signs  "BPs taken 154/84 supine, 82/60 sitting(repeat 90/60)"    VITALS  T(C): 36.6 (07-11-22 @ 08:22), Max: 36.8 (07-10-22 @ 22:02)  HR: 72 (07-11-22 @ 08:22) (72 - 81)  BP: 123/72 (07-11-22 @ 08:22) (97/60 - 166/85)  RR: 18 (07-11-22 @ 08:22) (16 - 20)  SpO2: 98% (07-11-22 @ 08:22) (96% - 99%)  Wt(kg): --    MEDICATIONS   aMIOdarone    Tablet 100 milliGRAM(s) daily  aspirin enteric coated 81 milliGRAM(s) daily  atorvastatin 20 milliGRAM(s) at bedtime  bisacodyl Suppository 10 milliGRAM(s) daily PRN  cyanocobalamin 1000 MICROGram(s) daily  dextrose 5%. 1000 milliLiter(s) <Continuous>  dextrose 5%. 1000 milliLiter(s) <Continuous>  dextrose 50% Injectable 25 Gram(s) once  dextrose 50% Injectable 25 Gram(s) once  dextrose 50% Injectable 12.5 Gram(s) once  dextrose Oral Gel 15 Gram(s) once PRN  enoxaparin Injectable 40 milliGRAM(s) every 24 hours  ferrous    sulfate 325 milliGRAM(s) two times a day  folic acid 1 milliGRAM(s) daily  glucagon  Injectable 1 milliGRAM(s) once  HYDROmorphone   Tablet 4 milliGRAM(s) every 4 hours PRN  HYDROmorphone  Injectable 0.5 milliGRAM(s) every 3 hours PRN  insulin glargine Injectable (LANTUS) 20 Unit(s) every morning  insulin lispro (ADMELOG) corrective regimen sliding scale   Before meals and at bedtime  levothyroxine 50 MICROGram(s) daily  magnesium hydroxide Suspension 30 milliLiter(s) daily PRN  midodrine. 15 milliGRAM(s) every 8 hours  ondansetron Injectable 4 milliGRAM(s) every 6 hours PRN  oxyCODONE    IR 10 milliGRAM(s) every 3 hours PRN  oxyCODONE    IR 5 milliGRAM(s) every 3 hours PRN      RECENT LABS/IMAGING                          8.9    12.81 )-----------( 320      ( 11 Jul 2022 01:00 )             27.3     07-11    130<L>  |  94<L>  |  22.5<H>  ----------------------------<  324<H>  5.0   |  24.0  |  1.55<H>    Ca    8.3<L>      11 Jul 2022 07:04    TPro  6.0<L>  /  Alb  2.7<L>  /  TBili  0.9  /  DBili  x   /  AST  21  /  ALT  14  /  AlkPhos  276<H>  07-11                  XR LEFT HIP 7/2 - There is a proximal short intramedullary keith in the left femur. The   distal fixation screws fractured. There is sliding screw transfixes a   intratrochanteric region fracture there is callus formation. There is a   new proximal femoral fracture in the region of the intramedullary keith.    ----------------------------------------------------------------------------------------  PHYSICAL EXAM  Constitutional - NAD, Comfortable  Extremities - No edema   Neurologic Exam -                    Cognitive - AAOx4     Motor - No focal deficits - related to pain/nausea     Sensory - BLE distal foot numbness  Psychiatric - Calm   ----------------------------------------------------------------------------------------  ASSESSMENT/PLAN  51yMale with functional deficits after a mechanical fall sustaining a periprosthetic fracture  Left  periprosthetic fracture s/p IMN - WBAT  DM - Lantus, Lispro  AFIB - Amiodarone  Orthostatic HypoTN - Midodrine, Will need optimization prior to DC  CAD - Lipitor, ASA  Pain - Tylenol, Dilaudid, Oxycodone - Recommend IV Medications  DVT PPX - SCDs, Lovenox   Rehab - Medically being optimized. Currently having orthostatic hypotension and is symptomatic. Continue to recommend ACUTE inpatient rehabilitation for the functional deficits consisting of 3 hours of therapy/day & 24 hour RN/daily PMR physician for comorbid medical management. Will continue to follow for ongoing rehab needs and recommendations. Patient will be able to tolerate 3 hours a day.    Continue bedside therapy as well as OOB throughout the day with mobilization throughout the day with staff to maintain cardiopulmonary function and prevention of secondary complications related to debility.     Discussed with rehab clinical team.

## 2022-07-11 NOTE — PROGRESS NOTE ADULT - ASSESSMENT
Pt is 52yo M w history of diabetes,  A. fib, depression, hypotension, HLD, hypothyroid initially presented to [facility] with complaints of pain in his left hip. Patient reports that he was trying to get out the pool and then he misplaced his foot and fell down on to his left hip. Patient complaints of pain at hip site. No other complaints. Of note, patient had a hip fracture of his left hip in October 2021 and was repaired at [facility]. Patient was transferred to [facility] for evaluation. Orthopedist requested admission to medicine for medical management.    *Left femur fracture s/p ORIF POD5  Ortho consult noted   Pain control  WBAT LLE   cont PT  Prevena vac removed 7/10   pending acute rehab        *Orthostatic hypotension   home dose 10mg   increased to 15mg 7/8  Trendelenburg as needed      *Anemia, postop acute blood loss  transfuse if hb<7 or symptomatic.   recheck CBC prior to discharge    *DM - uncontrolled   atypical insulin regimen at home  ISS, monitor BS   A1C -9  hypoglyemic episode noted in the afternoon   DC premeal, will adjust Lantus pending PM BGM      *Hyponatremia  Corrected sodium is 132  Monitor BMP    *CKD3  Monitor Renal function  avoid nephrotoxics. monitor intake and output.     *Afib  S/p ablation  C/w amiodarone  Not on AC    *Hypothyroid  C/w Synthroid    *HLD  Atorvastatin    DVT prophylaxis: Lovenox x 4 weeks  pending acute rehab

## 2022-07-11 NOTE — PROGRESS NOTE ADULT - SUBJECTIVE AND OBJECTIVE BOX
Pt Name: ELZBIETA CÁRDENAS  MRN: [id number]      Patient is a 51y Male being followed for left femur IM nail replacement, POD#6. Patient seen and examined at bedside. Patient is doing well. Pain is controlled with the prescribed medication. Awaiting AR placement. Denies CP, SOB, N/V, numbness/tingling. No other orthopedic complaints.    Overnight events: Patient had RR called for syncopal event while on commode, suspected vasovagal          Vital Signs Last 24 Hrs  T(C): 36.6 (11 Jul 2022 04:11), Max: 36.8 (10 Jul 2022 10:00)  T(F): 97.9 (11 Jul 2022 04:11), Max: 98.3 (10 Jul 2022 22:02)  HR: 73 (11 Jul 2022 04:11) (73 - 81)  BP: 125/77 (11 Jul 2022 04:11) (97/60 - 166/85)  BP(mean): --  RR: 18 (11 Jul 2022 04:11) (16 - 20)  SpO2: 98% (11 Jul 2022 04:11) (96% - 99%)    Parameters below as of 11 Jul 2022 04:11  Patient On (Oxygen Delivery Method): room air      Daily     Daily                           8.9    12.81 )-----------( 320      ( 11 Jul 2022 01:00 )             27.3     07-11    130<L>  |  94<L>  |  22.5<H>  ----------------------------<  324<H>  5.0   |  24.0  |  1.55<H>    Ca    8.3<L>      11 Jul 2022 07:04    TPro  6.0<L>  /  Alb  2.7<L>  /  TBili  0.9  /  DBili  x   /  AST  21  /  ALT  14  /  AlkPhos  276<H>  07-11      PHYSICAL EXAM:    Appearance: Alert, responsive, in no acute distress.    Musculoskeletal:       Left Lower Extremity: Left leg dressing with serous drainage, dressing removed and new dry dressing placed. No active bleeding from surgical site. No abrasions, ecchymosis, or erythema. No bleeding. Sensation is grossly intact to light touch. + dorsi/plantarflexion/EHL/FHL. DP pulses 2+. Cap refill < 2 seconds. No cyanosis. No signs of venous insufficiency or stasis.           A/P:  Pt is a  51y Male left femur IM nail replacement, POD#6    PLAN:   * Pain control  * DVTp  * Weight Bearing Status: WBAT LLE  * Dispo: AR today  * Continue care as per primary team

## 2022-07-11 NOTE — CHART NOTE - NSCHARTNOTEFT_GEN_A_CORE
Medicine PA-  *2 hr R.R. Note*  Pt has been sleeping comfortably, no new incidents or complaints.  VSS- 79- 129/80- 16- 98.1- 96% RA    07-11    129<L>  |  94<L>  |  24.8<H>  ----------------------------<  266<H>  4.5   |  25.0  |  1.48<H>    Ca    8.6      11 Jul 2022 02:09  Phos  3.6     07-09  Mg     1.7     07-09    TPro  6.0<L>  /  Alb  2.7<L>  /  TBili  0.9  /  DBili  x   /  AST  21  /  ALT  14  /  AlkPhos  276<H>  07-11    >Hyponatremia, RAVEN  -started IVF NS @75cc/hr  -repeat BMP  -may need nephrology consult  -continue to monitor

## 2022-07-11 NOTE — RAPID RESPONSE TEAM SUMMARY - NSSITUATIONBACKGROUNDRRT_GEN_ALL_CORE
NORM cd for that fainted while using the commode bedside, he has had similar episodes in the past. Hx orthostatic hypotension and takes midodrine at home, rx was recently increased from 10mg to 15mg tid. Pt POD #6 for L. hip IM nailing following a fall poolside. Pt has no new complaints at this time and said he was on the commode straining to have BM at the time and "just passed out".  R.R. cd for pt that fainted while using the commode bedside, he has had similar episodes in the past. Hx orthostatic hypotension and takes midodrine at home, rx was recently increased from 10mg to 15mg tid. Pt POD #6 for L. hip IM nailing following a fall poolside. Pt has no new complaints at this time and said he was on the commode straining to have BM at the time and "just passed out".

## 2022-07-11 NOTE — PROGRESS NOTE ADULT - SUBJECTIVE AND OBJECTIVE BOX
HPI  Pt is a 52yo M admitted to [facility] for left hip fx s/p ORIF.   Pt was seen and examined at bedside. Overnight, pt had episode of syncope secondary to vasovagal episode from BM. Pt is asymptomatic now. Episodes of hypoglycemia noted       Vital Signs Last 24 Hrs  T(C): 36.6 (11 Jul 2022 08:22), Max: 36.8 (10 Jul 2022 10:00)  T(F): 97.9 (11 Jul 2022 08:22), Max: 98.3 (10 Jul 2022 22:02)  HR: 72 (11 Jul 2022 08:22) (72 - 81)  BP: 123/72 (11 Jul 2022 08:22) (97/60 - 166/85)  BP(mean): --  RR: 18 (11 Jul 2022 08:22) (16 - 20)  SpO2: 98% (11 Jul 2022 08:22) (96% - 99%)    Parameters below as of 11 Jul 2022 08:22  Patient On (Oxygen Delivery Method): room air        I&O's Summary    10 Jul 2022 07:01  -  11 Jul 2022 07:00  --------------------------------------------------------  IN: 225 mL / OUT: 1 mL / NET: 224 mL        CAPILLARY BLOOD GLUCOSE      POCT Blood Glucose.: 340 mg/dL (11 Jul 2022 08:03)  POCT Blood Glucose.: 227 mg/dL (11 Jul 2022 00:36)  POCT Blood Glucose.: 239 mg/dL (10 Jul 2022 22:50)  POCT Blood Glucose.: 123 mg/dL (10 Jul 2022 16:06)  POCT Blood Glucose.: 66 mg/dL (10 Jul 2022 15:23)  POCT Blood Glucose.: 202 mg/dL (10 Jul 2022 12:29)      PHYSICAL EXAM:    Constitutional: NAD, awake    HEENT: PERR, EOMI,   Neck: Soft and supple,    Respiratory: Breath sounds are clear bilaterally,   Cardiovascular: S1 and S2,    Gastrointestinal: Bowel Sounds present, soft, nontender,    Extremities: left lower ext +2   Vascular: 2+ peripheral pulses  Neurological: A/O x 3,    Musculoskeletal: 5/5 strength b/l upper and lower extremities  Skin: left hip dressed     MEDICATIONS:  MEDICATIONS  (STANDING):  aMIOdarone    Tablet 100 milliGRAM(s) Oral daily  aspirin enteric coated 81 milliGRAM(s) Oral daily  atorvastatin 20 milliGRAM(s) Oral at bedtime  cyanocobalamin 1000 MICROGram(s) Oral daily  dextrose 5%. 1000 milliLiter(s) (50 mL/Hr) IV Continuous <Continuous>  dextrose 5%. 1000 milliLiter(s) (100 mL/Hr) IV Continuous <Continuous>  dextrose 50% Injectable 25 Gram(s) IV Push once  dextrose 50% Injectable 25 Gram(s) IV Push once  dextrose 50% Injectable 12.5 Gram(s) IV Push once  enoxaparin Injectable 40 milliGRAM(s) SubCutaneous every 24 hours  ferrous    sulfate 325 milliGRAM(s) Oral two times a day  folic acid 1 milliGRAM(s) Oral daily  glucagon  Injectable 1 milliGRAM(s) IntraMuscular once  insulin glargine Injectable (LANTUS) 20 Unit(s) SubCutaneous every morning  insulin lispro (ADMELOG) corrective regimen sliding scale   SubCutaneous Before meals and at bedtime  levothyroxine 50 MICROGram(s) Oral daily  midodrine. 15 milliGRAM(s) Oral every 8 hours  sodium chloride 0.9%. 1000 milliLiter(s) (75 mL/Hr) IV Continuous <Continuous>      LABS: All Labs Reviewed:                        8.9    12.81 )-----------( 320      ( 11 Jul 2022 01:00 )             27.3     07-11    130<L>  |  94<L>  |  22.5<H>  ----------------------------<  324<H>  5.0   |  24.0  |  1.55<H>    Ca    8.3<L>      11 Jul 2022 07:04    TPro  6.0<L>  /  Alb  2.7<L>  /  TBili  0.9  /  DBili  x   /  AST  21  /  ALT  14  /  AlkPhos  276<H>  07-11          Blood Culture:     RADIOLOGY/EKG:    DVT PPX:    ADVANCED DIRECTIVE:    DISPOSITION:

## 2022-07-12 LAB
GLUCOSE BLDC GLUCOMTR-MCNC: 188 MG/DL — HIGH (ref 70–99)
GLUCOSE BLDC GLUCOMTR-MCNC: 209 MG/DL — HIGH (ref 70–99)
GLUCOSE BLDC GLUCOMTR-MCNC: 246 MG/DL — HIGH (ref 70–99)
GLUCOSE BLDC GLUCOMTR-MCNC: 279 MG/DL — HIGH (ref 70–99)

## 2022-07-12 PROCEDURE — 99232 SBSQ HOSP IP/OBS MODERATE 35: CPT

## 2022-07-12 PROCEDURE — 99233 SBSQ HOSP IP/OBS HIGH 50: CPT

## 2022-07-12 RX ADMIN — Medication 81 MILLIGRAM(S): at 14:05

## 2022-07-12 RX ADMIN — Medication 4: at 17:48

## 2022-07-12 RX ADMIN — ATORVASTATIN CALCIUM 20 MILLIGRAM(S): 80 TABLET, FILM COATED ORAL at 21:25

## 2022-07-12 RX ADMIN — ENOXAPARIN SODIUM 40 MILLIGRAM(S): 100 INJECTION SUBCUTANEOUS at 14:05

## 2022-07-12 RX ADMIN — Medication 6: at 12:51

## 2022-07-12 RX ADMIN — Medication 325 MILLIGRAM(S): at 06:06

## 2022-07-12 RX ADMIN — Medication 1 MILLIGRAM(S): at 14:05

## 2022-07-12 RX ADMIN — MIDODRINE HYDROCHLORIDE 15 MILLIGRAM(S): 2.5 TABLET ORAL at 21:25

## 2022-07-12 RX ADMIN — OXYCODONE HYDROCHLORIDE 10 MILLIGRAM(S): 5 TABLET ORAL at 01:34

## 2022-07-12 RX ADMIN — MIDODRINE HYDROCHLORIDE 15 MILLIGRAM(S): 2.5 TABLET ORAL at 16:10

## 2022-07-12 RX ADMIN — Medication 325 MILLIGRAM(S): at 17:48

## 2022-07-12 RX ADMIN — OXYCODONE HYDROCHLORIDE 10 MILLIGRAM(S): 5 TABLET ORAL at 23:30

## 2022-07-12 RX ADMIN — MIDODRINE HYDROCHLORIDE 15 MILLIGRAM(S): 2.5 TABLET ORAL at 06:07

## 2022-07-12 RX ADMIN — PREGABALIN 1000 MICROGRAM(S): 225 CAPSULE ORAL at 16:10

## 2022-07-12 RX ADMIN — OXYCODONE HYDROCHLORIDE 10 MILLIGRAM(S): 5 TABLET ORAL at 00:34

## 2022-07-12 RX ADMIN — INSULIN GLARGINE 20 UNIT(S): 100 INJECTION, SOLUTION SUBCUTANEOUS at 08:46

## 2022-07-12 RX ADMIN — Medication 4: at 08:45

## 2022-07-12 RX ADMIN — OXYCODONE HYDROCHLORIDE 10 MILLIGRAM(S): 5 TABLET ORAL at 22:56

## 2022-07-12 RX ADMIN — AMIODARONE HYDROCHLORIDE 100 MILLIGRAM(S): 400 TABLET ORAL at 06:05

## 2022-07-12 RX ADMIN — Medication 50 MICROGRAM(S): at 06:07

## 2022-07-12 RX ADMIN — Medication 2: at 21:28

## 2022-07-12 NOTE — DIETITIAN INITIAL EVALUATION ADULT - OTHER INFO
51 year old male with history of diabetes,  Afib, depression, hypotension, HLD, hypothyroid [place] [facility] [place] of [place]. Admitted with Left femur fracture s/p ORIF.     Spoke with pt and pts  at bedside. Pt reports fair appetite/po intake at this time. Lunch tray observed at bedside, ~50% consumed per plate waste. Pt reports fair po intake PTA as well.  confirms pt "grazes" throughout the day and does not necessarily eat meals. Pt reports his weight does fluctuate, but is currently down from October 2021 (~250 lbs at that time, now ~230 lbs). Noted pt with uncontrolled DM (HgA1c 9.0%) states he checks his blood sugars 4-5x/day. Pt declined diet education, aware also declined CDCES interview. Limited NFPE conducted.RD to follow up.  51 year old male with history of diabetes,  Afib, depression, hypotension, HLD, hypothyroid [place] [facility] [place] of [place]. Admitted with Left femur fracture s/p ORIF.     Spoke with pt and pts spouse at bedside. Pt reports fair appetite/po intake at this time. Lunch tray observed at bedside, ~50% consumed per plate waste. Pt reports fair po intake PTA as well.  confirms pt "grazes" throughout the day and does not necessarily eat meals. Pt reports his weight does fluctuate, but is currently down from October 2021 (~250 lbs at that time, now ~230 lbs). Noted pt with uncontrolled DM (HgA1c 9.0%) states he checks his blood sugars 4-5x/day. Pt declined diet education, aware also declined CDCES interview. Limited NFPE conducted.RD to follow up.

## 2022-07-12 NOTE — CHART NOTE - NSCHARTNOTEFT_GEN_A_CORE
Diabetes Note: Aware pt with a1c 9%- states a1c is normally around 8.9%.  Pt reports taking Basaglar in the morning and Novolog twice per day.  Attempted to discuss diabetes self management, however patient declined and states "I don't do it your way, I do it my way."  Nutrition literature provided.  JOVANI RAJANES to remain available. Diabetes Note: Aware consult for diabetes education; pt with a1c 9%- states a1c is normally around 8.9%.  Pt reports taking Basaglar in the morning and Novolog twice per day.  Attempted to discuss diabetes self management, however patient declined and states "I don't do it your way, I do it my way."  Nutrition literature provided.  JOVANI ROSADO to remain available.

## 2022-07-12 NOTE — PROGRESS NOTE ADULT - SUBJECTIVE AND OBJECTIVE BOX
BP is improved.  Pending to go to [place]. Will need follow up.     REVIEW OF SYSTEMS  Constitutional - No fever,  No fatigue  Neurological - No headaches, No memory loss, +loss of strength   Musculoskeletal - +joint pain     FUNCTIONAL PROGRESS  7/11 PT  Bed Mobility  Bed Mobility Training Sit-to-Supine: minimum assist (75% patient effort);  1 person assist  Bed Mobility Training Supine-to-Sit: minimum assist (75% patient effort);  1 person assist  Bed Mobility Training Limitations: decreased ability to use legs for bridging/pushing;  decreased strength    Sit-Stand Transfer Training  Sit-to-Stand Transfer Training Treatment not Performed: pt with +orthostatic BP and symptomatic in sitting.   Sit-to-Stand Transfer Training Symptoms Noted During/After Treatment: significant change in vital signs    Gait Training  Gait Training Treatment not Performed: pt with +orthostatic BP and symptomatic in sitting.   Gait Training Symptoms Noted During/After Treatment: significant change in vital signs      VITALS  T(C): 36.6 (07-12-22 @ 04:45), Max: 36.8 (07-11-22 @ 22:11)  HR: 80 (07-12-22 @ 04:45) (70 - 80)  BP: 126/71 (07-12-22 @ 04:45) (121/72 - 144/80)  RR: 18 (07-12-22 @ 04:45) (18 - 19)  SpO2: 95% (07-12-22 @ 04:45) (95% - 97%)  Wt(kg): --    MEDICATIONS   aMIOdarone    Tablet 100 milliGRAM(s) daily  aspirin enteric coated 81 milliGRAM(s) daily  atorvastatin 20 milliGRAM(s) at bedtime  bisacodyl Suppository 10 milliGRAM(s) daily PRN  cyanocobalamin 1000 MICROGram(s) daily  dextrose 5%. 1000 milliLiter(s) <Continuous>  dextrose 5%. 1000 milliLiter(s) <Continuous>  dextrose 50% Injectable 25 Gram(s) once  dextrose 50% Injectable 25 Gram(s) once  dextrose 50% Injectable 12.5 Gram(s) once  dextrose Oral Gel 15 Gram(s) once PRN  enoxaparin Injectable 40 milliGRAM(s) every 24 hours  ferrous    sulfate 325 milliGRAM(s) two times a day  folic acid 1 milliGRAM(s) daily  glucagon  Injectable 1 milliGRAM(s) once  HYDROmorphone   Tablet 4 milliGRAM(s) every 4 hours PRN  HYDROmorphone  Injectable 0.5 milliGRAM(s) every 3 hours PRN  insulin glargine Injectable (LANTUS) 20 Unit(s) every morning  insulin lispro (ADMELOG) corrective regimen sliding scale   Before meals and at bedtime  levothyroxine 50 MICROGram(s) daily  magnesium hydroxide Suspension 30 milliLiter(s) daily PRN  midodrine. 15 milliGRAM(s) every 8 hours  ondansetron Injectable 4 milliGRAM(s) every 6 hours PRN  oxyCODONE    IR 10 milliGRAM(s) every 3 hours PRN  oxyCODONE    IR 5 milliGRAM(s) every 3 hours PRN      RECENT LABS/IMAGING                          8.9    12.81 )-----------( 320      ( 11 Jul 2022 01:00 )             27.3     07-11    130<L>  |  94<L>  |  22.5<H>  ----------------------------<  324<H>  5.0   |  24.0  |  1.55<H>    Ca    8.3<L>      11 Jul 2022 07:04    TPro  6.0<L>  /  Alb  2.7<L>  /  TBili  0.9  /  DBili  x   /  AST  21  /  ALT  14  /  AlkPhos  276<H>  07-11                    XR LEFT HIP 7/2 - There is a proximal short intramedullary keith in the left femur. The distal fixation screws fractured. There is sliding screw transfixes a intratrochanteric region fracture there is callus formation. There is a new proximal femoral fracture in the region of the intramedullary keith.    ----------------------------------------------------------------------------------------  PHYSICAL EXAM  Constitutional - NAD, Comfortable  Extremities - No edema   Neurologic Exam -                    Cognitive - AAOx4     Motor - No focal deficits - related to pain/nausea     Sensory - BLE distal foot numbness  Psychiatric - Calm   ----------------------------------------------------------------------------------------  ASSESSMENT/PLAN  51yMale with functional deficits after a mechanical fall sustaining a periprosthetic fracture  Left  periprosthetic fracture s/p IMN - WBAT  DM - Lantus, Lispro  AFIB - Amiodarone  Orthostatic HypoTN - Midodrine, Will need optimization prior to DC  CAD - Lipitor, ASA  Pain - Tylenol, Dilaudid, Oxycodone  DVT PPX - SCDs, Lovenox   Rehab - Medically being optimized. Currently having orthostatic hypotension and is symptomatic. Continue to recommend ACUTE inpatient rehabilitation for the functional deficits consisting of 3 hours of therapy/day & 24 hour RN/daily PMR physician for comorbid medical management. Will continue to follow for ongoing rehab needs and recommendations. Patient will be able to tolerate 3 hours a day.    Continue bedside therapy as well as OOB throughout the day with mobilization throughout the day with staff to maintain cardiopulmonary function and prevention of secondary complications related to debility.     Discussed with rehab clinical team.

## 2022-07-12 NOTE — DIETITIAN INITIAL EVALUATION ADULT - ADD RECOMMEND
Continue vitamin B12, folic acid, and ferrous sulfate supplementation; add MVI and vitamin C 500mg daily. Encourage po intake, monitor diet tolerance, and provide assistance at meals as needed. Obtain daily weights to monitor trends.

## 2022-07-12 NOTE — DIETITIAN NUTRITION RISK NOTIFICATION - ADDITIONAL COMMENTS/DIETITIAN RECOMMENDATIONS
Add Glucerna TID to optimize po intake and provide an additional 220 kcal, 10g protein per serving.  Continue vitamin B12, folic acid, and ferrous sulfate supplementation; add MVI and vitamin C 500mg daily. Encourage po intake, monitor diet tolerance, and provide assistance at meals as needed. Obtain daily weights to monitor trends.

## 2022-07-12 NOTE — DIETITIAN INITIAL EVALUATION ADULT - ORAL NUTRITION SUPPLEMENTS
Add Glucerna TID to optimize po intake and provide an additional 220 kcal, 10g protein per serving.

## 2022-07-12 NOTE — PROGRESS NOTE ADULT - SUBJECTIVE AND OBJECTIVE BOX
HPI  Pt is a 50yo M admitted to [facility] for left hip fx s/p ORIF.   Pt was seen and examined at bedside. Pt had another episode of orthostatic episode. asymptomatic while lying in bed      Vital Signs Last 24 Hrs  T(C): 36.7 (12 Jul 2022 15:49), Max: 36.8 (11 Jul 2022 22:11)  T(F): 98 (12 Jul 2022 15:49), Max: 98.2 (11 Jul 2022 22:11)  HR: 71 (12 Jul 2022 15:49) (70 - 80)  BP: 104/68 (12 Jul 2022 15:49) (104/68 - 144/80)  BP(mean): --  RR: 18 (12 Jul 2022 15:49) (18 - 19)  SpO2: 99% (12 Jul 2022 15:49) (95% - 99%)    Parameters below as of 12 Jul 2022 15:49  Patient On (Oxygen Delivery Method): room air        I&O's Summary    11 Jul 2022 07:01  -  12 Jul 2022 07:00  --------------------------------------------------------  IN: 0 mL / OUT: 400 mL / NET: -400 mL        CAPILLARY BLOOD GLUCOSE      POCT Blood Glucose.: 279 mg/dL (12 Jul 2022 12:12)  POCT Blood Glucose.: 246 mg/dL (12 Jul 2022 08:39)  POCT Blood Glucose.: 176 mg/dL (11 Jul 2022 22:30)  POCT Blood Glucose.: 258 mg/dL (11 Jul 2022 17:37)      PHYSICAL EXAM:  PHYSICAL EXAM:    Constitutional: NAD, awake    HEENT: PERR, EOMI,   Neck: Soft and supple,    Respiratory: Breath sounds are clear bilaterally,   Cardiovascular: S1 and S2,    Gastrointestinal: Bowel Sounds present, soft, nontender,    Extremities: left lower ext +2   Vascular: 2+ peripheral pulses  Neurological: A/O x 3,        MEDICATIONS:  MEDICATIONS  (STANDING):  aMIOdarone    Tablet 100 milliGRAM(s) Oral daily  aspirin enteric coated 81 milliGRAM(s) Oral daily  atorvastatin 20 milliGRAM(s) Oral at bedtime  cyanocobalamin 1000 MICROGram(s) Oral daily  dextrose 5%. 1000 milliLiter(s) (50 mL/Hr) IV Continuous <Continuous>  dextrose 5%. 1000 milliLiter(s) (100 mL/Hr) IV Continuous <Continuous>  dextrose 50% Injectable 25 Gram(s) IV Push once  dextrose 50% Injectable 12.5 Gram(s) IV Push once  dextrose 50% Injectable 25 Gram(s) IV Push once  enoxaparin Injectable 40 milliGRAM(s) SubCutaneous every 24 hours  ferrous    sulfate 325 milliGRAM(s) Oral two times a day  folic acid 1 milliGRAM(s) Oral daily  glucagon  Injectable 1 milliGRAM(s) IntraMuscular once  insulin glargine Injectable (LANTUS) 20 Unit(s) SubCutaneous every morning  insulin lispro (ADMELOG) corrective regimen sliding scale   SubCutaneous Before meals and at bedtime  levothyroxine 50 MICROGram(s) Oral daily  midodrine. 15 milliGRAM(s) Oral every 8 hours      LABS: All Labs Reviewed:                        8.9    12.81 )-----------( 320      ( 11 Jul 2022 01:00 )             27.3     07-11    130<L>  |  94<L>  |  22.5<H>  ----------------------------<  324<H>  5.0   |  24.0  |  1.55<H>    Ca    8.3<L>      11 Jul 2022 07:04    TPro  6.0<L>  /  Alb  2.7<L>  /  TBili  0.9  /  DBili  x   /  AST  21  /  ALT  14  /  AlkPhos  276<H>  07-11          Blood Culture:     RADIOLOGY/EKG:    DVT PPX:    ADVANCED DIRECTIVE:    DISPOSITION:

## 2022-07-12 NOTE — DIETITIAN INITIAL EVALUATION ADULT - PERTINENT MEDS FT
MEDICATIONS  (STANDING):  aMIOdarone    Tablet 100 milliGRAM(s) Oral daily  aspirin enteric coated 81 milliGRAM(s) Oral daily  atorvastatin 20 milliGRAM(s) Oral at bedtime  cyanocobalamin 1000 MICROGram(s) Oral daily  dextrose 5%. 1000 milliLiter(s) (50 mL/Hr) IV Continuous <Continuous>  dextrose 5%. 1000 milliLiter(s) (100 mL/Hr) IV Continuous <Continuous>  dextrose 50% Injectable 25 Gram(s) IV Push once  dextrose 50% Injectable 25 Gram(s) IV Push once  dextrose 50% Injectable 12.5 Gram(s) IV Push once  enoxaparin Injectable 40 milliGRAM(s) SubCutaneous every 24 hours  ferrous    sulfate 325 milliGRAM(s) Oral two times a day  folic acid 1 milliGRAM(s) Oral daily  glucagon  Injectable 1 milliGRAM(s) IntraMuscular once  insulin glargine Injectable (LANTUS) 20 Unit(s) SubCutaneous every morning  insulin lispro (ADMELOG) corrective regimen sliding scale   SubCutaneous Before meals and at bedtime  levothyroxine 50 MICROGram(s) Oral daily  midodrine. 15 milliGRAM(s) Oral every 8 hours    MEDICATIONS  (PRN):  bisacodyl Suppository 10 milliGRAM(s) Rectal daily PRN If no bowel movement  dextrose Oral Gel 15 Gram(s) Oral once PRN Blood Glucose LESS THAN 70 milliGRAM(s)/deciliter  HYDROmorphone   Tablet 4 milliGRAM(s) Oral every 4 hours PRN Severe Pain (7 - 10)  magnesium hydroxide Suspension 30 milliLiter(s) Oral daily PRN Constipation  ondansetron Injectable 4 milliGRAM(s) IV Push every 6 hours PRN Nausea and/or Vomiting  oxyCODONE    IR 10 milliGRAM(s) Oral every 3 hours PRN Moderate Pain (4 - 6)  oxyCODONE    IR 5 milliGRAM(s) Oral every 3 hours PRN Mild Pain (1 - 3)

## 2022-07-12 NOTE — PROGRESS NOTE ADULT - ASSESSMENT
Pt is 52yo M w history of diabetes,  A. fib, depression, hypotension, HLD, hypothyroid initially presented to [facility] [place] with complaints of pain in his left hip. Patient reports that he was trying to get out the pool and then he misplaced his foot and fell down on to his left hip. Patient complaints of pain at hip site. No other complaints. Of note, patient had a hip fracture of his left hip in October 2021 and was repaired at [facility] [place]. Patient was transferred to [facility] for evaluation. Orthopedist requested admission to medicine for medical management.    *Left femur fracture s/p ORIF POD5  Ortho consult noted   Pain control  WBAT LLE   cont PT if pt able to tolerate, wouls suggest PT in the Pm only   Prevena vac removed 7/10   pending acute rehab        *Orthostatic hypotension   home dose 10mg   increased to 15mg 7/8  Trendelenburg as needed    Advice pt bring abdominal binder from home   bilateral lower ext ACE wrap at all time when awake  PT only in the pm     *Anemia, postop acute blood loss  transfuse if hb<7 or symptomatic.   recheck CBC prior to discharge    *DM - uncontrolled   atypical insulin regimen at home  ISS, monitor BS   A1C -9  hypoglyemic episode noted in the afternoon   DC premeal, will adjust Lantus pending PM BGM      *Hyponatremia  Corrected sodium is 132  Monitor BMP    *CKD3  Monitor Renal function  avoid nephrotoxics. monitor intake and output.     *Afib  S/p ablation  C/w amiodarone  Not on AC    *Hypothyroid  C/w Synthroid    *HLD  Atorvastatin    DVT prophylaxis: Lovenox x 4 weeks  pending acute rehab

## 2022-07-12 NOTE — DIETITIAN INITIAL EVALUATION ADULT - PERTINENT LABORATORY DATA
07-11    130<L>  |  94<L>  |  22.5<H>  ----------------------------<  324<H>  5.0   |  24.0  |  1.55<H>    Ca    8.3<L>      11 Jul 2022 07:04    TPro  6.0<L>  /  Alb  2.7<L>  /  TBili  0.9  /  DBili  x   /  AST  21  /  ALT  14  /  AlkPhos  276<H>  07-11  POCT Blood Glucose.: 279 mg/dL (07-12-22 @ 12:12)  A1C with Estimated Average Glucose Result: 9.0 % (07-03-22 @ 07:51)

## 2022-07-13 LAB
ANION GAP SERPL CALC-SCNC: 13 MMOL/L — SIGNIFICANT CHANGE UP (ref 5–17)
BUN SERPL-MCNC: 18.6 MG/DL — SIGNIFICANT CHANGE UP (ref 8–20)
CALCIUM SERPL-MCNC: 8.4 MG/DL — LOW (ref 8.6–10.2)
CHLORIDE SERPL-SCNC: 97 MMOL/L — LOW (ref 98–107)
CO2 SERPL-SCNC: 23 MMOL/L — SIGNIFICANT CHANGE UP (ref 22–29)
CREAT SERPL-MCNC: 1.43 MG/DL — HIGH (ref 0.5–1.3)
EGFR: 59 ML/MIN/1.73M2 — LOW
GLUCOSE BLDC GLUCOMTR-MCNC: 225 MG/DL — HIGH (ref 70–99)
GLUCOSE BLDC GLUCOMTR-MCNC: 296 MG/DL — HIGH (ref 70–99)
GLUCOSE BLDC GLUCOMTR-MCNC: 334 MG/DL — HIGH (ref 70–99)
GLUCOSE BLDC GLUCOMTR-MCNC: 385 MG/DL — HIGH (ref 70–99)
GLUCOSE SERPL-MCNC: 283 MG/DL — HIGH (ref 70–99)
HCT VFR BLD CALC: 25.8 % — LOW (ref 39–50)
HGB BLD-MCNC: 8.1 G/DL — LOW (ref 13–17)
MCHC RBC-ENTMCNC: 29.9 PG — SIGNIFICANT CHANGE UP (ref 27–34)
MCHC RBC-ENTMCNC: 31.4 GM/DL — LOW (ref 32–36)
MCV RBC AUTO: 95.2 FL — SIGNIFICANT CHANGE UP (ref 80–100)
PLATELET # BLD AUTO: 325 K/UL — SIGNIFICANT CHANGE UP (ref 150–400)
POTASSIUM SERPL-MCNC: 4.4 MMOL/L — SIGNIFICANT CHANGE UP (ref 3.5–5.3)
POTASSIUM SERPL-SCNC: 4.4 MMOL/L — SIGNIFICANT CHANGE UP (ref 3.5–5.3)
RBC # BLD: 2.71 M/UL — LOW (ref 4.2–5.8)
RBC # FLD: 13 % — SIGNIFICANT CHANGE UP (ref 10.3–14.5)
SODIUM SERPL-SCNC: 133 MMOL/L — LOW (ref 135–145)
WBC # BLD: 12.37 K/UL — HIGH (ref 3.8–10.5)
WBC # FLD AUTO: 12.37 K/UL — HIGH (ref 3.8–10.5)

## 2022-07-13 PROCEDURE — 99233 SBSQ HOSP IP/OBS HIGH 50: CPT

## 2022-07-13 PROCEDURE — 99232 SBSQ HOSP IP/OBS MODERATE 35: CPT

## 2022-07-13 RX ORDER — MIDODRINE HYDROCHLORIDE 2.5 MG/1
1 TABLET ORAL
Qty: 0 | Refills: 0 | DISCHARGE

## 2022-07-13 RX ORDER — ATORVASTATIN CALCIUM 80 MG/1
1 TABLET, FILM COATED ORAL
Qty: 0 | Refills: 0 | DISCHARGE
Start: 2022-07-13

## 2022-07-13 RX ORDER — SODIUM CHLORIDE 9 MG/ML
1000 INJECTION INTRAMUSCULAR; INTRAVENOUS; SUBCUTANEOUS
Refills: 0 | Status: DISCONTINUED | OUTPATIENT
Start: 2022-07-13 | End: 2022-07-14

## 2022-07-13 RX ORDER — ENOXAPARIN SODIUM 100 MG/ML
40 INJECTION SUBCUTANEOUS
Qty: 0 | Refills: 0 | DISCHARGE
Start: 2022-07-13 | End: 2022-08-13

## 2022-07-13 RX ORDER — MAGNESIUM HYDROXIDE 400 MG/1
30 TABLET, CHEWABLE ORAL
Qty: 0 | Refills: 0 | DISCHARGE
Start: 2022-07-13

## 2022-07-13 RX ORDER — OXYCODONE HYDROCHLORIDE 5 MG/1
10 TABLET ORAL
Refills: 0 | Status: DISCONTINUED | OUTPATIENT
Start: 2022-07-13 | End: 2022-07-18

## 2022-07-13 RX ORDER — MIDODRINE HYDROCHLORIDE 2.5 MG/1
3 TABLET ORAL
Qty: 0 | Refills: 0 | DISCHARGE
Start: 2022-07-13

## 2022-07-13 RX ADMIN — PREGABALIN 1000 MICROGRAM(S): 225 CAPSULE ORAL at 11:46

## 2022-07-13 RX ADMIN — Medication 325 MILLIGRAM(S): at 17:28

## 2022-07-13 RX ADMIN — AMIODARONE HYDROCHLORIDE 100 MILLIGRAM(S): 400 TABLET ORAL at 05:22

## 2022-07-13 RX ADMIN — INSULIN GLARGINE 20 UNIT(S): 100 INJECTION, SOLUTION SUBCUTANEOUS at 08:23

## 2022-07-13 RX ADMIN — Medication 1 MILLIGRAM(S): at 11:46

## 2022-07-13 RX ADMIN — Medication 325 MILLIGRAM(S): at 05:22

## 2022-07-13 RX ADMIN — MIDODRINE HYDROCHLORIDE 15 MILLIGRAM(S): 2.5 TABLET ORAL at 16:26

## 2022-07-13 RX ADMIN — OXYCODONE HYDROCHLORIDE 10 MILLIGRAM(S): 5 TABLET ORAL at 21:19

## 2022-07-13 RX ADMIN — ENOXAPARIN SODIUM 40 MILLIGRAM(S): 100 INJECTION SUBCUTANEOUS at 16:26

## 2022-07-13 RX ADMIN — OXYCODONE HYDROCHLORIDE 10 MILLIGRAM(S): 5 TABLET ORAL at 20:19

## 2022-07-13 RX ADMIN — SODIUM CHLORIDE 50 MILLILITER(S): 9 INJECTION INTRAMUSCULAR; INTRAVENOUS; SUBCUTANEOUS at 09:53

## 2022-07-13 RX ADMIN — Medication 81 MILLIGRAM(S): at 11:46

## 2022-07-13 RX ADMIN — Medication 50 MICROGRAM(S): at 05:22

## 2022-07-13 RX ADMIN — Medication 10: at 11:45

## 2022-07-13 RX ADMIN — Medication 8: at 08:22

## 2022-07-13 RX ADMIN — Medication 6: at 21:16

## 2022-07-13 RX ADMIN — ATORVASTATIN CALCIUM 20 MILLIGRAM(S): 80 TABLET, FILM COATED ORAL at 21:16

## 2022-07-13 RX ADMIN — MIDODRINE HYDROCHLORIDE 15 MILLIGRAM(S): 2.5 TABLET ORAL at 05:22

## 2022-07-13 RX ADMIN — Medication 4: at 16:34

## 2022-07-13 RX ADMIN — ONDANSETRON 4 MILLIGRAM(S): 8 TABLET, FILM COATED ORAL at 08:22

## 2022-07-13 NOTE — PROGRESS NOTE ADULT - SUBJECTIVE AND OBJECTIVE BOX
HPI  Pt is a 50yo M admitted to [facility] for left hip fx s/p ORIF.   Pt was seen and examined at bedside.    PHYSICAL EXAM:  Vital Signs Last 24 Hrs  T(C): 36.9 (13 Jul 2022 04:23), Max: 36.9 (13 Jul 2022 04:23)  T(F): 98.5 (13 Jul 2022 04:23), Max: 98.5 (13 Jul 2022 04:23)  HR: 77 (13 Jul 2022 04:23) (71 - 77)  BP: 134/79 (13 Jul 2022 04:23) (104/68 - 163/72)  BP(mean): --  RR: 18 (13 Jul 2022 04:23) (18 - 18)  SpO2: 93% (13 Jul 2022 04:23) (93% - 99%)    Parameters below as of 13 Jul 2022 04:23  Patient On (Oxygen Delivery Method): room air    Constitutional: NAD, awake    HEENT: PERR, EOMI,   Neck: Soft and supple,    Respiratory: Breath sounds are clear bilaterally,   Cardiovascular: S1 and S2,    Gastrointestinal: Bowel Sounds present, soft, nontender,    Extremities: left lower ext +2   Vascular: 2+ peripheral pulses  Neurological: A/O x 3,                            8.1    12.37 )-----------( 325      ( 13 Jul 2022 05:33 )             25.8     07-13    133<L>  |  97<L>  |  18.6  ----------------------------<  283<H>  4.4   |  23.0  |  1.43<H>    Ca    8.4<L>      13 Jul 2022 05:33                CAPILLARY BLOOD GLUCOSE      POCT Blood Glucose.: 188 mg/dL (12 Jul 2022 21:27)  POCT Blood Glucose.: 209 mg/dL (12 Jul 2022 17:02)  POCT Blood Glucose.: 279 mg/dL (12 Jul 2022 12:12)  POCT Blood Glucose.: 246 mg/dL (12 Jul 2022 08:39)      MEDICATIONS  (STANDING):  aMIOdarone    Tablet 100 milliGRAM(s) Oral daily  aspirin enteric coated 81 milliGRAM(s) Oral daily  atorvastatin 20 milliGRAM(s) Oral at bedtime  cyanocobalamin 1000 MICROGram(s) Oral daily  dextrose 5%. 1000 milliLiter(s) (50 mL/Hr) IV Continuous <Continuous>  dextrose 5%. 1000 milliLiter(s) (100 mL/Hr) IV Continuous <Continuous>  dextrose 50% Injectable 25 Gram(s) IV Push once  dextrose 50% Injectable 25 Gram(s) IV Push once  dextrose 50% Injectable 12.5 Gram(s) IV Push once  enoxaparin Injectable 40 milliGRAM(s) SubCutaneous every 24 hours  ferrous    sulfate 325 milliGRAM(s) Oral two times a day  folic acid 1 milliGRAM(s) Oral daily  glucagon  Injectable 1 milliGRAM(s) IntraMuscular once  insulin glargine Injectable (LANTUS) 20 Unit(s) SubCutaneous every morning  insulin lispro (ADMELOG) corrective regimen sliding scale   SubCutaneous Before meals and at bedtime  levothyroxine 50 MICROGram(s) Oral daily  midodrine. 15 milliGRAM(s) Oral every 8 hours    MEDICATIONS  (PRN):  bisacodyl Suppository 10 milliGRAM(s) Rectal daily PRN If no bowel movement  dextrose Oral Gel 15 Gram(s) Oral once PRN Blood Glucose LESS THAN 70 milliGRAM(s)/deciliter  magnesium hydroxide Suspension 30 milliLiter(s) Oral daily PRN Constipation  ondansetron Injectable 4 milliGRAM(s) IV Push every 6 hours PRN Nausea and/or Vomiting      I&O's Summary    12 Jul 2022 07:01  -  13 Jul 2022 07:00  --------------------------------------------------------  IN: 0 mL / OUT: 1 mL / NET: -1 mL

## 2022-07-13 NOTE — PROGRESS NOTE ADULT - SUBJECTIVE AND OBJECTIVE BOX
Patient feeling better.  Did work a bit with PT yesterday.  Pain is controlled.     REVIEW OF SYSTEMS  Constitutional - No fever,  No fatigue  Neurological - +loss of strength     FUNCTIONAL PROGRESS  7/12 PT  Bed Mobility  Bed Mobility Training Rehab Potential: good, to achieve stated therapy goals  Bed Mobility Training Symptoms Noted During/After Treatment: +orthostatic BP, no c/o symptoms no signs distress  Bed Mobility Training Sit-to-Supine: minimum assist (75% patient effort);  1 person assist;  bed rails  Bed Mobility Training Supine-to-Sit: minimum assist (75% patient effort);  1 person assist;  bed rails  Bed Mobility Training Limitations: decreased ability to use legs for bridging/pushing;  Decreased ROM, Decreased strength, Pain     Sit-Stand Transfer Training  Sit-to-Stand Transfer Training Symptoms Noted During/After Treatment: +orthostatic BP, no c/o dizziness  Transfer Training Sit-to-Stand Transfer: minimum assist (75% patient effort);  1 person assist;  weight-bearing as tolerated   Left LE    rolling walker  Transfer Training Stand-to-Sit Transfer: minimum assist (75% patient effort);  1 person assist;  weight-bearing as tolerated   rolling walker;  Left LE   Sit-to-Stand Transfer Training Transfer Safety Analysis: decreased balance;  Decreased ROM, Decreased strength, Pain     Gait Training  Gait Training Symptoms Noted During/After Treatment: pt with improved BP (102/68) with no c/o dizziness/ lightheaded. Post gait training pt reports slight dizziness and orthostatic BP (84/54) improved symptoms with prolonged sitting.   Gait Training: minimum assist (75% patient effort);  1 person assist;  weight-bearing as tolerated   Left LE    rolling walker;  10 feet;  stand by of another person due to orthostatic BP changes   Gait Analysis: 3-point gait   decreased catalina;  decreased step length;  decreased stride length;  Decreased ROM, Decreased strength, Pain         VITALS  T(C): 36.9 (07-13-22 @ 04:23), Max: 36.9 (07-13-22 @ 04:23)  HR: 77 (07-13-22 @ 04:23) (71 - 77)  BP: 134/79 (07-13-22 @ 04:23) (104/68 - 163/72)  RR: 18 (07-13-22 @ 04:23) (18 - 18)  SpO2: 93% (07-13-22 @ 04:23) (93% - 99%)  Wt(kg): --    MEDICATIONS   aMIOdarone    Tablet 100 milliGRAM(s) daily  aspirin enteric coated 81 milliGRAM(s) daily  atorvastatin 20 milliGRAM(s) at bedtime  bisacodyl Suppository 10 milliGRAM(s) daily PRN  cyanocobalamin 1000 MICROGram(s) daily  dextrose 5%. 1000 milliLiter(s) <Continuous>  dextrose 5%. 1000 milliLiter(s) <Continuous>  dextrose 50% Injectable 25 Gram(s) once  dextrose 50% Injectable 25 Gram(s) once  dextrose 50% Injectable 12.5 Gram(s) once  dextrose Oral Gel 15 Gram(s) once PRN  enoxaparin Injectable 40 milliGRAM(s) every 24 hours  ferrous    sulfate 325 milliGRAM(s) two times a day  folic acid 1 milliGRAM(s) daily  glucagon  Injectable 1 milliGRAM(s) once  insulin glargine Injectable (LANTUS) 20 Unit(s) every morning  insulin lispro (ADMELOG) corrective regimen sliding scale   Before meals and at bedtime  levothyroxine 50 MICROGram(s) daily  magnesium hydroxide Suspension 30 milliLiter(s) daily PRN  midodrine. 15 milliGRAM(s) every 8 hours  ondansetron Injectable 4 milliGRAM(s) every 6 hours PRN  sodium chloride 0.9%. 1000 milliLiter(s) <Continuous>      RECENT LABS/IMAGING                          8.1    12.37 )-----------( 325      ( 13 Jul 2022 05:33 )             25.8     07-13    133<L>  |  97<L>  |  18.6  ----------------------------<  283<H>  4.4   |  23.0  |  1.43<H>    Ca    8.4<L>      13 Jul 2022 05:33                  XR LEFT HIP 7/2 - There is a proximal short intramedullary keith in the left femur. The distal fixation screws fractured. There is sliding screw transfixes a intratrochanteric region fracture there is callus formation. There is a new proximal femoral fracture in the region of the intramedullary keith.    ----------------------------------------------------------------------------------------  PHYSICAL EXAM  Constitutional - NAD, Comfortable  Extremities - No edema   Neurologic Exam -                    Cognitive - AAOx4     Motor - No focal deficits - related to pain/nausea     Sensory - BLE distal foot numbness  Psychiatric - Calm   ----------------------------------------------------------------------------------------  ASSESSMENT/PLAN  51yMale with functional deficits after a mechanical fall sustaining a periprosthetic fracture  Left  periprosthetic fracture s/p IMN - WBAT  DM - Lantus, Lispro  AFIB - Amiodarone  Orthostatic HypoTN - Midodrine   CAD - Lipitor, ASA  Pain - Tylenol   DVT PPX - SCDs, Lovenox   Rehab - Continue to recommend ACUTE inpatient rehabilitation for the functional deficits consisting of 3 hours of therapy/day & 24 hour RN/daily PMR physician for comorbid medical management. Will continue to follow for ongoing rehab needs and recommendations. Patient will be able to tolerate 3 hours a day.    Continue bedside therapy as well as OOB throughout the day with mobilization throughout the day with staff to maintain cardiopulmonary function and prevention of secondary complications related to debility.     Discussed with rehab clinical team.

## 2022-07-13 NOTE — PROGRESS NOTE ADULT - ASSESSMENT
Pt is 50yo M w history of diabetes,  A. fib, depression, hypotension, HLD, hypothyroid initially presented to [facility] with complaints of pain in his left hip. Patient reports that he was trying to get out the pool and then he misplaced his foot and fell down on to his left hip. Patient complaints of pain at hip site. No other complaints. Of note, patient had a hip fracture of his left hip in October 2021 and was repaired at [facility]. Patient was transferred to [facility] for evaluation. Orthopedist requested admission to medicine for medical management.    #Left femur fracture s/p ORIF POD5  Ortho consult noted   Pain control  WBAT LLE   cont PT if pt able to tolerate, wouls suggest PT in the Pm only   Prevena vac removed 7/10   pending acute rehab        #Orthostatic hypotension   home dose 10mg   increased to 15mg 7/8  Trendelenburg as needed    Advice pt bring abdominal binder from home   bilateral lower ext ACE wrap at all time when awake  PT only in the pm     #Anemia, postop acute blood loss  stable   transfuse if hb<7 or symptomatic.   recheck CBC prior to discharge    #DM - uncontrolled   atypical insulin regimen at home  ISS, monitor BS   A1C -9  hypoglyemic episode noted in the afternoon   DC premeal, will adjust Lantus pending PM BGM      #Hyponatremia  Corrected sodium is 132->133  Monitor BMP    #CKD3  Monitor Renal function  avoid nephrotoxics. monitor intake and output.     #Afib  S/p ablation  C/w amiodarone  Not on AC    #Hypothyroid  C/w Synthroid    #HLD  Atorvastatin    DVT prophylaxis: Lovenox x 4 weeks  pending acute rehab

## 2022-07-13 NOTE — PROGRESS NOTE ADULT - SUBJECTIVE AND OBJECTIVE BOX
Patient seen and examined. called about saturated dressings.  Dressing with serous drainage. dressing removed.  incision c/d/i. staples intact. no active draining. No erythema.  New dressings placed.

## 2022-07-14 LAB
ALBUMIN SERPL ELPH-MCNC: 2.7 G/DL — LOW (ref 3.3–5.2)
ALP SERPL-CCNC: 238 U/L — HIGH (ref 40–120)
ALT FLD-CCNC: 11 U/L — SIGNIFICANT CHANGE UP
ANION GAP SERPL CALC-SCNC: 11 MMOL/L — SIGNIFICANT CHANGE UP (ref 5–17)
AST SERPL-CCNC: 15 U/L — SIGNIFICANT CHANGE UP
BILIRUB SERPL-MCNC: 0.8 MG/DL — SIGNIFICANT CHANGE UP (ref 0.4–2)
BLD GP AB SCN SERPL QL: SIGNIFICANT CHANGE UP
BUN SERPL-MCNC: 17 MG/DL — SIGNIFICANT CHANGE UP (ref 8–20)
CALCIUM SERPL-MCNC: 8.2 MG/DL — LOW (ref 8.6–10.2)
CHLORIDE SERPL-SCNC: 99 MMOL/L — SIGNIFICANT CHANGE UP (ref 98–107)
CO2 SERPL-SCNC: 24 MMOL/L — SIGNIFICANT CHANGE UP (ref 22–29)
CREAT SERPL-MCNC: 1.19 MG/DL — SIGNIFICANT CHANGE UP (ref 0.5–1.3)
EGFR: 73 ML/MIN/1.73M2 — SIGNIFICANT CHANGE UP
GLUCOSE BLDC GLUCOMTR-MCNC: 185 MG/DL — HIGH (ref 70–99)
GLUCOSE BLDC GLUCOMTR-MCNC: 213 MG/DL — HIGH (ref 70–99)
GLUCOSE BLDC GLUCOMTR-MCNC: 238 MG/DL — HIGH (ref 70–99)
GLUCOSE BLDC GLUCOMTR-MCNC: 294 MG/DL — HIGH (ref 70–99)
GLUCOSE BLDC GLUCOMTR-MCNC: 306 MG/DL — HIGH (ref 70–99)
GLUCOSE SERPL-MCNC: 223 MG/DL — HIGH (ref 70–99)
HCT VFR BLD CALC: 24.4 % — LOW (ref 39–50)
HGB BLD-MCNC: 7.9 G/DL — LOW (ref 13–17)
MAGNESIUM SERPL-MCNC: 1.6 MG/DL — SIGNIFICANT CHANGE UP (ref 1.6–2.6)
MCHC RBC-ENTMCNC: 31.1 PG — SIGNIFICANT CHANGE UP (ref 27–34)
MCHC RBC-ENTMCNC: 32.4 GM/DL — SIGNIFICANT CHANGE UP (ref 32–36)
MCV RBC AUTO: 96.1 FL — SIGNIFICANT CHANGE UP (ref 80–100)
PHOSPHATE SERPL-MCNC: 3.2 MG/DL — SIGNIFICANT CHANGE UP (ref 2.4–4.7)
PLATELET # BLD AUTO: 289 K/UL — SIGNIFICANT CHANGE UP (ref 150–400)
POTASSIUM SERPL-MCNC: 4.5 MMOL/L — SIGNIFICANT CHANGE UP (ref 3.5–5.3)
POTASSIUM SERPL-SCNC: 4.5 MMOL/L — SIGNIFICANT CHANGE UP (ref 3.5–5.3)
PROT SERPL-MCNC: 5.8 G/DL — LOW (ref 6.6–8.7)
RBC # BLD: 2.54 M/UL — LOW (ref 4.2–5.8)
RBC # FLD: 13.1 % — SIGNIFICANT CHANGE UP (ref 10.3–14.5)
SODIUM SERPL-SCNC: 134 MMOL/L — LOW (ref 135–145)
WBC # BLD: 8.78 K/UL — SIGNIFICANT CHANGE UP (ref 3.8–10.5)
WBC # FLD AUTO: 8.78 K/UL — SIGNIFICANT CHANGE UP (ref 3.8–10.5)

## 2022-07-14 PROCEDURE — 99233 SBSQ HOSP IP/OBS HIGH 50: CPT

## 2022-07-14 PROCEDURE — 93306 TTE W/DOPPLER COMPLETE: CPT | Mod: 26

## 2022-07-14 PROCEDURE — 99232 SBSQ HOSP IP/OBS MODERATE 35: CPT

## 2022-07-14 RX ORDER — SODIUM CHLORIDE 9 MG/ML
1000 INJECTION INTRAMUSCULAR; INTRAVENOUS; SUBCUTANEOUS
Refills: 0 | Status: DISCONTINUED | OUTPATIENT
Start: 2022-07-14 | End: 2022-07-15

## 2022-07-14 RX ORDER — SODIUM CHLORIDE 9 MG/ML
500 INJECTION INTRAMUSCULAR; INTRAVENOUS; SUBCUTANEOUS ONCE
Refills: 0 | Status: COMPLETED | OUTPATIENT
Start: 2022-07-14 | End: 2022-07-14

## 2022-07-14 RX ORDER — INSULIN GLARGINE 100 [IU]/ML
15 INJECTION, SOLUTION SUBCUTANEOUS
Refills: 0 | Status: DISCONTINUED | OUTPATIENT
Start: 2022-07-14 | End: 2022-07-20

## 2022-07-14 RX ORDER — INSULIN GLARGINE 100 [IU]/ML
25 INJECTION, SOLUTION SUBCUTANEOUS
Refills: 0 | Status: DISCONTINUED | OUTPATIENT
Start: 2022-07-14 | End: 2022-07-14

## 2022-07-14 RX ADMIN — Medication 325 MILLIGRAM(S): at 05:07

## 2022-07-14 RX ADMIN — AMIODARONE HYDROCHLORIDE 100 MILLIGRAM(S): 400 TABLET ORAL at 05:06

## 2022-07-14 RX ADMIN — Medication 4: at 12:50

## 2022-07-14 RX ADMIN — MIDODRINE HYDROCHLORIDE 15 MILLIGRAM(S): 2.5 TABLET ORAL at 12:51

## 2022-07-14 RX ADMIN — SODIUM CHLORIDE 500 MILLILITER(S): 9 INJECTION INTRAMUSCULAR; INTRAVENOUS; SUBCUTANEOUS at 10:15

## 2022-07-14 RX ADMIN — OXYCODONE HYDROCHLORIDE 10 MILLIGRAM(S): 5 TABLET ORAL at 11:30

## 2022-07-14 RX ADMIN — Medication 81 MILLIGRAM(S): at 12:51

## 2022-07-14 RX ADMIN — INSULIN GLARGINE 20 UNIT(S): 100 INJECTION, SOLUTION SUBCUTANEOUS at 08:15

## 2022-07-14 RX ADMIN — MIDODRINE HYDROCHLORIDE 15 MILLIGRAM(S): 2.5 TABLET ORAL at 05:08

## 2022-07-14 RX ADMIN — PREGABALIN 1000 MICROGRAM(S): 225 CAPSULE ORAL at 12:51

## 2022-07-14 RX ADMIN — MIDODRINE HYDROCHLORIDE 15 MILLIGRAM(S): 2.5 TABLET ORAL at 22:43

## 2022-07-14 RX ADMIN — OXYCODONE HYDROCHLORIDE 10 MILLIGRAM(S): 5 TABLET ORAL at 10:32

## 2022-07-14 RX ADMIN — SODIUM CHLORIDE 75 MILLILITER(S): 9 INJECTION INTRAMUSCULAR; INTRAVENOUS; SUBCUTANEOUS at 10:05

## 2022-07-14 RX ADMIN — Medication 1 MILLIGRAM(S): at 12:51

## 2022-07-14 RX ADMIN — INSULIN GLARGINE 15 UNIT(S): 100 INJECTION, SOLUTION SUBCUTANEOUS at 22:57

## 2022-07-14 RX ADMIN — Medication 4: at 16:56

## 2022-07-14 RX ADMIN — ATORVASTATIN CALCIUM 20 MILLIGRAM(S): 80 TABLET, FILM COATED ORAL at 22:43

## 2022-07-14 RX ADMIN — Medication 325 MILLIGRAM(S): at 16:57

## 2022-07-14 RX ADMIN — Medication 50 MICROGRAM(S): at 05:07

## 2022-07-14 RX ADMIN — Medication 2: at 22:58

## 2022-07-14 RX ADMIN — Medication 8: at 08:14

## 2022-07-14 NOTE — PROGRESS NOTE ADULT - SUBJECTIVE AND OBJECTIVE BOX
Patient making limited progress, with sit to stand.   BP range is WNL.     REVIEW OF SYSTEMS  Constitutional - No fever,  +fatigue  Neurological - No headaches, +loss of strength   Musculoskeletal - +muscle pain    FUNCTIONAL PROGRESS  7/12 PT  Bed Mobility  Bed Mobility Training Rehab Potential: good, to achieve stated therapy goals  Bed Mobility Training Symptoms Noted During/After Treatment: +orthostatic BP, no c/o symptoms no signs distress  Bed Mobility Training Sit-to-Supine: minimum assist (75% patient effort);  1 person assist;  bed rails  Bed Mobility Training Supine-to-Sit: minimum assist (75% patient effort);  1 person assist;  bed rails  Bed Mobility Training Limitations: decreased ability to use legs for bridging/pushing;  Decreased ROM, Decreased strength, Pain     Sit-Stand Transfer Training  Sit-to-Stand Transfer Training Symptoms Noted During/After Treatment: +orthostatic BP, no c/o dizziness  Transfer Training Sit-to-Stand Transfer: minimum assist (75% patient effort);  1 person assist;  weight-bearing as tolerated   Left LE    rolling walker  Transfer Training Stand-to-Sit Transfer: minimum assist (75% patient effort);  1 person assist;  weight-bearing as tolerated   rolling walker;  Left LE   Sit-to-Stand Transfer Training Transfer Safety Analysis: decreased balance;  Decreased ROM, Decreased strength, Pain     Gait Training  Gait Training Symptoms Noted During/After Treatment: pt with improved BP (102/68) with no c/o dizziness/ lightheaded. Post gait training pt reports slight dizziness and orthostatic BP (84/54) improved symptoms with prolonged sitting.   Gait Training: minimum assist (75% patient effort);  1 person assist;  weight-bearing as tolerated   Left LE    rolling walker;  10 feet;  stand by of another person due to orthostatic BP changes   Gait Analysis: 3-point gait   decreased catalina;  decreased step length;  decreased stride length;  Decreased ROM, Decreased strength, Pain       VITALS  T(C): 36.8 (07-14-22 @ 10:05), Max: 36.8 (07-14-22 @ 04:04)  HR: 105 (07-14-22 @ 10:05) (63 - 105)  BP: 109/65 (07-14-22 @ 10:05) (109/65 - 144/75)  RR: 18 (07-14-22 @ 10:05) (18 - 18)  SpO2: 97% (07-14-22 @ 10:05) (97% - 100%)  Wt(kg): --    MEDICATIONS   aMIOdarone    Tablet 100 milliGRAM(s) daily  aspirin enteric coated 81 milliGRAM(s) daily  atorvastatin 20 milliGRAM(s) at bedtime  bisacodyl Suppository 10 milliGRAM(s) daily PRN  cyanocobalamin 1000 MICROGram(s) daily  dextrose 5%. 1000 milliLiter(s) <Continuous>  dextrose 5%. 1000 milliLiter(s) <Continuous>  dextrose 50% Injectable 25 Gram(s) once  dextrose 50% Injectable 25 Gram(s) once  dextrose 50% Injectable 12.5 Gram(s) once  dextrose Oral Gel 15 Gram(s) once PRN  enoxaparin Injectable 40 milliGRAM(s) every 24 hours  ferrous    sulfate 325 milliGRAM(s) two times a day  folic acid 1 milliGRAM(s) daily  glucagon  Injectable 1 milliGRAM(s) once  insulin glargine Injectable (LANTUS) 15 Unit(s) two times a day  insulin lispro (ADMELOG) corrective regimen sliding scale   Before meals and at bedtime  levothyroxine 50 MICROGram(s) daily  magnesium hydroxide Suspension 30 milliLiter(s) daily PRN  midodrine. 15 milliGRAM(s) every 8 hours  ondansetron Injectable 4 milliGRAM(s) every 6 hours PRN  oxyCODONE    IR 10 milliGRAM(s) every 3 hours PRN  sodium chloride 0.9% Bolus 500 milliLiter(s) once  sodium chloride 0.9%. 1000 milliLiter(s) <Continuous>      RECENT LABS/IMAGING                          7.9    8.78  )-----------( 289      ( 14 Jul 2022 06:25 )             24.4     07-14    134<L>  |  99  |  17.0  ----------------------------<  223<H>  4.5   |  24.0  |  1.19    Ca    8.2<L>      14 Jul 2022 06:25  Phos  3.2     07-14  Mg     1.6     07-14    TPro  5.8<L>  /  Alb  2.7<L>  /  TBili  0.8  /  DBili  x   /  AST  15  /  ALT  11  /  AlkPhos  238<H>  07-14                  XR LEFT HIP 7/2 - There is a proximal short intramedullary keith in the left femur. The distal fixation screws fractured. There is sliding screw transfixes a intratrochanteric region fracture there is callus formation. There is a new proximal femoral fracture in the region of the intramedullary keith.    ----------------------------------------------------------------------------------------  PHYSICAL EXAM  Constitutional - NAD, Comfortable  Extremities - No edema   Neurologic Exam -                    Cognitive - AAOx4     Motor - No focal deficits - related to pain/nausea     Sensory - BLE distal foot numbness  Psychiatric - Calm   ----------------------------------------------------------------------------------------  ASSESSMENT/PLAN  51yMale with functional deficits after a mechanical fall sustaining a periprosthetic fracture  Left  periprosthetic fracture s/p IMN - WBAT  DM - Lantus, Lispro  AFIB - Amiodarone  Orthostatic HypoTN - Midodrine, Recommend Cardiology consultation, TTE pending  CAD - Lipitor, ASA  Pain - Tylenol, Oxycodone  DVT PPX - SCDs, Lovenox   Rehab - Pending medical optimization prior to AR.     Will continue to follow. Rehab recommendations are dependent on how functional progress changes as well as how patient continues to participate and tolerate therapeutic interventions, which may change. Recommend ongoing mobilization by staff to maintain cardiopulmonary function and prevention of secondary complications related to debility. Discussed with rehab team.

## 2022-07-14 NOTE — PROGRESS NOTE ADULT - SUBJECTIVE AND OBJECTIVE BOX
Pt Name: ELZBIETA CÁRDENAS    MRN: [id number]      Patient is a 51M being followed for left hip periimplant fracture. Patient is now POD #9 s/p left hip IMN exchange. No acute events reported overnight. Patient was seen and evaluated at bedside this AM. Patient is doing well today. Pain is controlled with medications. The patient is tolerating diet and participating in PT. Patient is currently pending AR placement. No acute orthopedic complaints are expressed at this time. Patient denies fever, chills, CP, SOB, abdominal pain, calf pain, numbness, or weakness.        PAST MEDICAL & SURGICAL HISTORY:  DM (diabetes mellitus)      History of left hip replacement          Allergies: Allergy Status Unknown      Medications: aMIOdarone    Tablet 100 milliGRAM(s) Oral daily  aspirin enteric coated 81 milliGRAM(s) Oral daily  atorvastatin 20 milliGRAM(s) Oral at bedtime  bisacodyl Suppository 10 milliGRAM(s) Rectal daily PRN  cyanocobalamin 1000 MICROGram(s) Oral daily  dextrose 5%. 1000 milliLiter(s) IV Continuous <Continuous>  dextrose 5%. 1000 milliLiter(s) IV Continuous <Continuous>  dextrose 50% Injectable 12.5 Gram(s) IV Push once  dextrose 50% Injectable 25 Gram(s) IV Push once  dextrose 50% Injectable 25 Gram(s) IV Push once  dextrose Oral Gel 15 Gram(s) Oral once PRN  enoxaparin Injectable 40 milliGRAM(s) SubCutaneous every 24 hours  ferrous    sulfate 325 milliGRAM(s) Oral two times a day  folic acid 1 milliGRAM(s) Oral daily  glucagon  Injectable 1 milliGRAM(s) IntraMuscular once  insulin glargine Injectable (LANTUS) 20 Unit(s) SubCutaneous every morning  insulin lispro (ADMELOG) corrective regimen sliding scale   SubCutaneous Before meals and at bedtime  levothyroxine 50 MICROGram(s) Oral daily  magnesium hydroxide Suspension 30 milliLiter(s) Oral daily PRN  midodrine. 15 milliGRAM(s) Oral every 8 hours  ondansetron Injectable 4 milliGRAM(s) IV Push every 6 hours PRN  oxyCODONE    IR 10 milliGRAM(s) Oral every 3 hours PRN  sodium chloride 0.9%. 1000 milliLiter(s) IV Continuous <Continuous>                          7.9    8.78  )-----------( 289      ( 14 Jul 2022 06:25 )             24.4     07-14    134<L>  |  99  |  17.0  ----------------------------<  223<H>  4.5   |  24.0  |  1.19    Ca    8.2<L>      14 Jul 2022 06:25  Phos  3.2     07-14  Mg     1.6     07-14    TPro  5.8<L>  /  Alb  2.7<L>  /  TBili  0.8  /  DBili  x   /  AST  15  /  ALT  11  /  AlkPhos  238<H>  07-14      PHYSICAL EXAM:    Vital Signs Last 24 Hrs  T(C): 36.8 (14 Jul 2022 04:04), Max: 37 (13 Jul 2022 09:30)  T(F): 98.3 (14 Jul 2022 04:04), Max: 98.6 (13 Jul 2022 09:30)  HR: 63 (14 Jul 2022 04:04) (63 - 75)  BP: 116/70 (14 Jul 2022 04:04) (116/70 - 156/90)  BP(mean): --  RR: 18 (14 Jul 2022 04:04) (18 - 18)  SpO2: 99% (14 Jul 2022 04:04) (98% - 100%)    Parameters below as of 14 Jul 2022 04:04  Patient On (Oxygen Delivery Method): room air      Daily     Daily     Appearance: Alert, responsive, in no acute distress.    Skin: no rash on visible skin. Skin is clean, dry and intact. No bleeding. No abrasions. No ulcerations.    Musculoskeletal:         Left Lower Extremity: Skin warm and intact. Dressings clean and dry. No erythema, drainage, or induration. Minimal TTP of surgical site. Calf supple/nontender. SILT distally at foot. +TA/GSC/EHL/FHL. BCR.          Right Lower Extremity: Calf supple/nontender.       A/P: Pt is a 51y Male POD #9 s/p left hip IMN exchange for periimplant fracture.      PLAN:   -Pain control PRN.  -WBAT of left lower extremity.  -DVT PPx; patient on Lovenox.  -PT.  -Incentive dima.  -OOB.  -Remaining care as per primary team.

## 2022-07-14 NOTE — PROGRESS NOTE ADULT - ASSESSMENT
Pt is 52yo M w history of diabetes,  A. fib, depression, hypotension, HLD, hypothyroid [place] [facility] [place] of [place] in his left hip. Patient reports that he was trying to get out the pool and then he misplaced his foot and fell down on to his left hip. Patient complaints of pain at hip site. No other complaints. Of note, patient had a hip fracture of his left hip in October 2021 and was [place] [facility] Patient was transferred to [facility] for evaluation. Orthopedist requested admission to medicine for medical management.    #Left femur fracture s/p ORIF POD5  Ortho consult noted   Pain control  WBAT LLE   cont PT if pt able to tolerate, wouls suggest PT in the Pm only   Prevena vac removed 7/10   pending acute rehab        #Orthostatic hypotension   Multifactorial,  Likely Autonomic dysfunction from uncontrolled DM , Dehydration  Ordering ECHo   home dose 10mg   increased to 15mg 7/8  Trendelenburg as needed    Advice pt bring abdominal binder from home   bilateral lower ext ACE wrap at all time when awake  PT only in the pm   Started on IV fluids, increasing to 75mlNS   Ordering Cortisol  We will Consult Cardiology Dr Rosales   Consider Fludrocortisone     #Anemia, postop acute blood loss  stable   transfuse if hb<7 or symptomatic.   recheck CBC prior to discharge    #DM - uncontrolled   Atypical insulin regimen at home  ISS, monitor BS   A1C -9  hypoglycemia episode noted in the afternoon   DC premeal, will adjust Lantus pending PM BGM        #Hyponatremia  Corrected sodium is 132->133->134  Monitor BMP    #RAVEN on CKD3  Monitor Renal function  avoid nephrotoxics. monitor intake and output.   Creatinine     #Afib  S/p ablation  C/w amiodarone  Not on AC    #Hypothyroid  C/w Synthroid    #HLD  Atorvastatin    DVT prophylaxis: Lovenox x 4 weeks  pending acute rehab  Pt is 52yo M w history of diabetes,  A. fib, depression, hypotension, HLD, hypothyroid initially presented to [facility] with complaints of pain in his left hip. Patient reports that he was trying to get out the pool and then he misplaced his foot and fell down on to his left hip. Patient complaints of pain at hip site. No other complaints. Of note, patient had a hip fracture of his left hip in October 2021 and was repaired at [facility]. Patient was transferred to [facility] for evaluation. Orthopedist requested admission to medicine for medical management.    #Left femur fracture s/p ORIF POD5  Ortho consult noted   Pain control  WBAT LLE   cont PT if pt able to tolerate, wouls suggest PT in the Pm only   Prevena vac removed 7/10   pending acute rehab        #Orthostatic hypotension   Multifactorial,  Likely Autonomic dysfunction from uncontrolled DM , Dehydration  Ordering ECHo   home dose 10mg   increased to 15mg 7/8  Trendelenburg as needed    Advice pt bring abdominal binder from home   bilateral lower ext ACE wrap at all time when awake  PT only in the pm   Started on IV fluids, increasing to 75mlNS   Ordering Cortisol  We will Consult Cardiology Dr Rosales   Consider Fludrocortisone     #Anemia, postop acute blood loss  Symptomatic   hem dropped from admission and them remained stable   ordering iron panel. Ferritin, B12, Folic Acid  transfuse if hb<7 or symptomatic.   recheck CBC prior to discharge  I will Consider 1 PRBC blood transfusion     #DM - uncontrolled   Atypical insulin regimen at home  ISS, monitor BS   A1C -9  hypoglycemia episode noted in the afternoon   DC premeal, will adjust Lantus pending PM BGM        #Hyponatremia  Corrected sodium is 132->133->134  Monitor BMP    #RAVEN on CKD3  Monitor Renal function  avoid nephrotoxics. monitor intake and output.   Creatinine     #Afib  S/p ablation  C/w amiodarone  Not on AC    #Hypothyroid  C/w Synthroid    #HLD  Atorvastatin    DVT prophylaxis: Lovenox x 4 weeks  pending acute rehab  Pt is 52yo M w history of diabetes,  A. fib, depression, hypotension, HLD, hypothyroid initially presented to [facility] with complaints of pain in his left hip. Patient reports that he was trying to get out the pool and then he misplaced his foot and fell down on to his left hip. Patient complaints of pain at hip site. No other complaints. Of note, patient had a hip fracture of his left hip in October 2021 and was repaired at [facility]. Patient was transferred to [facility] for evaluation. Orthopedist requested admission to medicine for medical management.    #Left femur fracture s/p ORIF POD5  Ortho consult noted   Pain control  WBAT LLE   cont PT if pt able to tolerate, wouls suggest PT in the Pm only   Prevena vac removed 7/10   pending acute rehab        #Orthostatic hypotension   Multifactorial,  Likely Autonomic dysfunction from uncontrolled DM , Dehydration, Hypovolemia, Symptomatic anemia   Ordering ECHo   home dose 10mg   increased to 15mg 7/8  Trendelenburg as needed    Advice pt bring abdominal binder from home   bilateral lower ext ACE wrap at all time when awake  PT only in the pm   Started on IV fluids, increasing to 75mlNS   Ordering Cortisol  We will Consult Cardiology Dr Rosales   Consider Fludrocortisone       #Anemia, postop acute blood loss  Symptomatic   hem dropped from admission and them remained stable but Symptomatic   ordering iron panel. Ferritin, B12, Folic Acid  Patient  symptomatic.   monitor hemoglobin   I will  transfuse 1 PRBC blood transfusion     #DM - uncontrolled   Atypical insulin regimen at home  ISS, monitor BS   A1C -9  hypoglycemia episode noted in the afternoon   DC premeal, will adjust Lantus pending PM BGM        #Hyponatremia  Corrected sodium is 132->133->134  Monitor BMP    #RAVEN on CKD3  Monitor Renal function  avoid nephrotoxics. monitor intake and output.   Creatinine     #Afib  S/p ablation  C/w amiodarone  Not on AC    #Hypothyroid  C/w Synthroid    #HLD  Atorvastatin    DVT prophylaxis: Lovenox x 4 weeks  pending acute rehab

## 2022-07-14 NOTE — PROGRESS NOTE ADULT - SUBJECTIVE AND OBJECTIVE BOX
HPI  Pt is a 52yo M admitted to [facility] for left hip fx s/p ORIF.   Pt was seen and examined at bedside.  Patient was orthostatic yesterday     PHYSICAL EXAM:  Vital Signs Last 24 Hrs  Vital Signs Last 24 Hrs  T(C): 36.8 (14 Jul 2022 04:04), Max: 37 (13 Jul 2022 09:30)  T(F): 98.3 (14 Jul 2022 04:04), Max: 98.6 (13 Jul 2022 09:30)  HR: 63 (14 Jul 2022 04:04) (63 - 75)  BP: 116/70 (14 Jul 2022 04:04) (116/70 - 156/90)  BP(mean): --  RR: 18 (14 Jul 2022 04:04) (18 - 18)  SpO2: 99% (14 Jul 2022 04:04) (98% - 100%)  Parameters below as of 14 Jul 2022 04:04  Patient On (Oxygen Delivery Method): room air  Parameters below as of 13 Jul 2022 04:23  Patient On (Oxygen Delivery Method): room air  Constitutional: NAD, awake    HEENT: PERR, EOMI,   Neck: Soft and supple,    Respiratory: Breath sounds are clear bilaterally,   Cardiovascular: S1 and S2,    Gastrointestinal: Bowel Sounds present, soft, nontender,    Extremities: left lower ext +2   Vascular: 2+ peripheral pulses  Neurological: A/O x 3,                          7.9    8.78  )-----------( 289      ( 14 Jul 2022 06:25 )             24.4     07-14    134<L>  |  99  |  17.0  ----------------------------<  223<H>  4.5   |  24.0  |  1.19    Ca    8.2<L>      14 Jul 2022 06:25  Phos  3.2     07-14  Mg     1.6     07-14    TPro  5.8<L>  /  Alb  2.7<L>  /  TBili  0.8  /  DBili  x   /  AST  15  /  ALT  11  /  AlkPhos  238<H>  07-14          CAPILLARY BLOOD GLUCOSE    POCT Blood Glucose.: 306 mg/dL (14 Jul 2022 07:41)  POCT Blood Glucose.: 296 mg/dL (13 Jul 2022 21:06)  POCT Blood Glucose.: 225 mg/dL (13 Jul 2022 16:29)  POCT Blood Glucose.: 385 mg/dL (13 Jul 2022 11:35)    MEDICATIONS  (STANDING):  aMIOdarone    Tablet 100 milliGRAM(s) Oral daily  aspirin enteric coated 81 milliGRAM(s) Oral daily  atorvastatin 20 milliGRAM(s) Oral at bedtime  cyanocobalamin 1000 MICROGram(s) Oral daily  dextrose 5%. 1000 milliLiter(s) (50 mL/Hr) IV Continuous <Continuous>  dextrose 5%. 1000 milliLiter(s) (100 mL/Hr) IV Continuous <Continuous>  dextrose 50% Injectable 25 Gram(s) IV Push once  dextrose 50% Injectable 25 Gram(s) IV Push once  dextrose 50% Injectable 12.5 Gram(s) IV Push once  enoxaparin Injectable 40 milliGRAM(s) SubCutaneous every 24 hours  ferrous    sulfate 325 milliGRAM(s) Oral two times a day  folic acid 1 milliGRAM(s) Oral daily  glucagon  Injectable 1 milliGRAM(s) IntraMuscular once  insulin glargine Injectable (LANTUS) 15 Unit(s) SubCutaneous two times a day  insulin lispro (ADMELOG) corrective regimen sliding scale   SubCutaneous Before meals and at bedtime  levothyroxine 50 MICROGram(s) Oral daily  midodrine. 15 milliGRAM(s) Oral every 8 hours  sodium chloride 0.9%. 1000 milliLiter(s) (75 mL/Hr) IV Continuous <Continuous>    MEDICATIONS  (PRN):  bisacodyl Suppository 10 milliGRAM(s) Rectal daily PRN If no bowel movement  dextrose Oral Gel 15 Gram(s) Oral once PRN Blood Glucose LESS THAN 70 milliGRAM(s)/deciliter  magnesium hydroxide Suspension 30 milliLiter(s) Oral daily PRN Constipation  ondansetron Injectable 4 milliGRAM(s) IV Push every 6 hours PRN Nausea and/or Vomiting  oxyCODONE    IR 10 milliGRAM(s) Oral every 3 hours PRN Moderate Pain (4 - 6)      I&O's Summary    13 Jul 2022 07:01  -  14 Jul 2022 07:00  --------------------------------------------------------  IN: 0 mL / OUT: 201 mL / NET: -201 mL

## 2022-07-15 DIAGNOSIS — I95.1 ORTHOSTATIC HYPOTENSION: ICD-10-CM

## 2022-07-15 DIAGNOSIS — Z98.890 OTHER SPECIFIED POSTPROCEDURAL STATES: ICD-10-CM

## 2022-07-15 DIAGNOSIS — I48.91 UNSPECIFIED ATRIAL FIBRILLATION: ICD-10-CM

## 2022-07-15 LAB
ALBUMIN SERPL ELPH-MCNC: 2.7 G/DL — LOW (ref 3.3–5.2)
ALP SERPL-CCNC: 233 U/L — HIGH (ref 40–120)
ALT FLD-CCNC: 11 U/L — SIGNIFICANT CHANGE UP
ANION GAP SERPL CALC-SCNC: 11 MMOL/L — SIGNIFICANT CHANGE UP (ref 5–17)
AST SERPL-CCNC: 16 U/L — SIGNIFICANT CHANGE UP
BILIRUB SERPL-MCNC: 0.8 MG/DL — SIGNIFICANT CHANGE UP (ref 0.4–2)
BUN SERPL-MCNC: 17.1 MG/DL — SIGNIFICANT CHANGE UP (ref 8–20)
CALCIUM SERPL-MCNC: 8.3 MG/DL — LOW (ref 8.6–10.2)
CHLORIDE SERPL-SCNC: 99 MMOL/L — SIGNIFICANT CHANGE UP (ref 98–107)
CO2 SERPL-SCNC: 24 MMOL/L — SIGNIFICANT CHANGE UP (ref 22–29)
CREAT SERPL-MCNC: 1.41 MG/DL — HIGH (ref 0.5–1.3)
EGFR: 60 ML/MIN/1.73M2 — SIGNIFICANT CHANGE UP
FERRITIN SERPL-MCNC: 315 NG/ML — SIGNIFICANT CHANGE UP (ref 30–400)
GLUCOSE BLDC GLUCOMTR-MCNC: 199 MG/DL — HIGH (ref 70–99)
GLUCOSE BLDC GLUCOMTR-MCNC: 225 MG/DL — HIGH (ref 70–99)
GLUCOSE BLDC GLUCOMTR-MCNC: 247 MG/DL — HIGH (ref 70–99)
GLUCOSE BLDC GLUCOMTR-MCNC: 249 MG/DL — HIGH (ref 70–99)
GLUCOSE SERPL-MCNC: 211 MG/DL — HIGH (ref 70–99)
HCT VFR BLD CALC: 26.2 % — LOW (ref 39–50)
HCT VFR BLD CALC: 26.5 % — LOW (ref 39–50)
HGB BLD-MCNC: 8.7 G/DL — LOW (ref 13–17)
IRON SATN MFR SERPL: 14 % — LOW (ref 16–55)
IRON SATN MFR SERPL: 30 UG/DL — LOW (ref 59–158)
MCHC RBC-ENTMCNC: 31.1 PG — SIGNIFICANT CHANGE UP (ref 27–34)
MCHC RBC-ENTMCNC: 32.8 GM/DL — SIGNIFICANT CHANGE UP (ref 32–36)
MCV RBC AUTO: 94.6 FL — SIGNIFICANT CHANGE UP (ref 80–100)
PLATELET # BLD AUTO: 292 K/UL — SIGNIFICANT CHANGE UP (ref 150–400)
POTASSIUM SERPL-MCNC: 4.3 MMOL/L — SIGNIFICANT CHANGE UP (ref 3.5–5.3)
POTASSIUM SERPL-SCNC: 4.3 MMOL/L — SIGNIFICANT CHANGE UP (ref 3.5–5.3)
PROT SERPL-MCNC: 5.6 G/DL — LOW (ref 6.6–8.7)
RBC # BLD: 2.8 M/UL — LOW (ref 4.2–5.8)
RBC # FLD: 13.4 % — SIGNIFICANT CHANGE UP (ref 10.3–14.5)
SARS-COV-2 RNA SPEC QL NAA+PROBE: SIGNIFICANT CHANGE UP
SODIUM SERPL-SCNC: 134 MMOL/L — LOW (ref 135–145)
T4 AB SER-ACNC: 7 UG/DL — SIGNIFICANT CHANGE UP (ref 4.5–12)
TIBC SERPL-MCNC: 210 UG/DL — LOW (ref 220–430)
TRANSFERRIN SERPL-MCNC: 147 MG/DL — LOW (ref 180–329)
TSH SERPL-MCNC: 6.61 UIU/ML — HIGH (ref 0.27–4.2)
VIT B12 SERPL-MCNC: >2000 PG/ML — HIGH (ref 232–1245)
WBC # BLD: 8.36 K/UL — SIGNIFICANT CHANGE UP (ref 3.8–10.5)
WBC # FLD AUTO: 8.36 K/UL — SIGNIFICANT CHANGE UP (ref 3.8–10.5)

## 2022-07-15 PROCEDURE — 99233 SBSQ HOSP IP/OBS HIGH 50: CPT

## 2022-07-15 PROCEDURE — 99223 1ST HOSP IP/OBS HIGH 75: CPT

## 2022-07-15 PROCEDURE — 99232 SBSQ HOSP IP/OBS MODERATE 35: CPT

## 2022-07-15 RX ORDER — FERROUS SULFATE 325(65) MG
325 TABLET ORAL
Refills: 0 | Status: DISCONTINUED | OUTPATIENT
Start: 2022-07-15 | End: 2022-07-20

## 2022-07-15 RX ORDER — LEVOTHYROXINE SODIUM 125 MCG
1 TABLET ORAL
Qty: 0 | Refills: 0 | DISCHARGE

## 2022-07-15 RX ORDER — IRON SUCROSE 20 MG/ML
100 INJECTION, SOLUTION INTRAVENOUS EVERY 24 HOURS
Refills: 0 | Status: DISCONTINUED | OUTPATIENT
Start: 2022-07-15 | End: 2022-07-15

## 2022-07-15 RX ORDER — PETROLATUM,WHITE
1 JELLY (GRAM) TOPICAL
Refills: 0 | Status: DISCONTINUED | OUTPATIENT
Start: 2022-07-15 | End: 2022-07-20

## 2022-07-15 RX ORDER — LEVOTHYROXINE SODIUM 125 MCG
1 TABLET ORAL
Qty: 0 | Refills: 0 | DISCHARGE
Start: 2022-07-15

## 2022-07-15 RX ORDER — IRON SUCROSE 20 MG/ML
100 INJECTION, SOLUTION INTRAVENOUS EVERY 24 HOURS
Refills: 0 | Status: COMPLETED | OUTPATIENT
Start: 2022-07-15 | End: 2022-07-17

## 2022-07-15 RX ORDER — LEVOTHYROXINE SODIUM 125 MCG
75 TABLET ORAL DAILY
Refills: 0 | Status: DISCONTINUED | OUTPATIENT
Start: 2022-07-15 | End: 2022-07-20

## 2022-07-15 RX ADMIN — Medication 325 MILLIGRAM(S): at 17:59

## 2022-07-15 RX ADMIN — PREGABALIN 1000 MICROGRAM(S): 225 CAPSULE ORAL at 11:33

## 2022-07-15 RX ADMIN — Medication 81 MILLIGRAM(S): at 11:33

## 2022-07-15 RX ADMIN — Medication 1 MILLIGRAM(S): at 11:33

## 2022-07-15 RX ADMIN — MIDODRINE HYDROCHLORIDE 15 MILLIGRAM(S): 2.5 TABLET ORAL at 06:00

## 2022-07-15 RX ADMIN — AMIODARONE HYDROCHLORIDE 100 MILLIGRAM(S): 400 TABLET ORAL at 06:00

## 2022-07-15 RX ADMIN — OXYCODONE HYDROCHLORIDE 10 MILLIGRAM(S): 5 TABLET ORAL at 19:34

## 2022-07-15 RX ADMIN — Medication 2: at 22:31

## 2022-07-15 RX ADMIN — ATORVASTATIN CALCIUM 20 MILLIGRAM(S): 80 TABLET, FILM COATED ORAL at 22:21

## 2022-07-15 RX ADMIN — Medication 4: at 17:21

## 2022-07-15 RX ADMIN — SODIUM CHLORIDE 75 MILLILITER(S): 9 INJECTION INTRAMUSCULAR; INTRAVENOUS; SUBCUTANEOUS at 01:23

## 2022-07-15 RX ADMIN — Medication 325 MILLIGRAM(S): at 06:00

## 2022-07-15 RX ADMIN — INSULIN GLARGINE 15 UNIT(S): 100 INJECTION, SOLUTION SUBCUTANEOUS at 22:44

## 2022-07-15 RX ADMIN — MIDODRINE HYDROCHLORIDE 15 MILLIGRAM(S): 2.5 TABLET ORAL at 22:21

## 2022-07-15 RX ADMIN — INSULIN GLARGINE 15 UNIT(S): 100 INJECTION, SOLUTION SUBCUTANEOUS at 08:54

## 2022-07-15 RX ADMIN — Medication 4: at 08:15

## 2022-07-15 RX ADMIN — ENOXAPARIN SODIUM 40 MILLIGRAM(S): 100 INJECTION SUBCUTANEOUS at 17:21

## 2022-07-15 RX ADMIN — IRON SUCROSE 210 MILLIGRAM(S): 20 INJECTION, SOLUTION INTRAVENOUS at 17:21

## 2022-07-15 RX ADMIN — Medication 1 APPLICATION(S): at 17:59

## 2022-07-15 RX ADMIN — Medication 50 MICROGRAM(S): at 06:00

## 2022-07-15 RX ADMIN — Medication 4: at 12:38

## 2022-07-15 NOTE — PROGRESS NOTE ADULT - SUBJECTIVE AND OBJECTIVE BOX
HPI  Pt is a 50yo M admitted to [facility] for left hip fx s/p ORIF.   Pt was seen and examined at bedside.  Patient was orthostatic yesterday     PHYSICAL EXAM:  Vital Signs Last 24 Hrs  T(C): 36.6 (15 Jul 2022 08:03), Max: 36.8 (14 Jul 2022 10:05)  T(F): 97.9 (15 Jul 2022 08:03), Max: 98.2 (14 Jul 2022 10:05)  HR: 80 (15 Jul 2022 09:22) (66 - 105)  BP: 140/62 (15 Jul 2022 09:22) (92/65 - 181/79)  BP(mean): --  RR: 18 (15 Jul 2022 08:03) (18 - 19)  SpO2: 98% (15 Jul 2022 08:03) (97% - 100%)  Parameters below as of 15 Jul 2022 08:03  Patient On (Oxygen Delivery Method): room air  Constitutional: NAD, awake    HEENT: PERR, EOMI,   Neck: Soft and supple,    Respiratory: Breath sounds are clear bilaterally,   Cardiovascular: S1 and S2,    Gastrointestinal: Bowel Sounds present, soft, nontender,    Extremities: left lower ext +2   Left legs wound: clean   Vascular: 2+ peripheral pulses  Neurological: A/O x 3,                                     8.7    8.36  )-----------( 292      ( 15 Jul 2022 07:11 )             26.5     07-15    134<L>  |  99  |  17.1  ----------------------------<  211<H>  4.3   |  24.0  |  1.41<H>    Ca    8.3<L>      15 Jul 2022 07:11  Phos  3.2     07-14  Mg     1.6     07-14    TPro  5.6<L>  /  Alb  2.7<L>  /  TBili  0.8  /  DBili  x   /  AST  16  /  ALT  11  /  AlkPhos  233<H>  07-15              CAPILLARY BLOOD GLUCOSE      POCT Blood Glucose.: 225 mg/dL (15 Jul 2022 08:00)  POCT Blood Glucose.: 185 mg/dL (14 Jul 2022 22:39)  POCT Blood Glucose.: 238 mg/dL (14 Jul 2022 16:55)  POCT Blood Glucose.: 213 mg/dL (14 Jul 2022 12:39)      MEDICATIONS  (STANDING):  aMIOdarone    Tablet 100 milliGRAM(s) Oral daily  aspirin enteric coated 81 milliGRAM(s) Oral daily  atorvastatin 20 milliGRAM(s) Oral at bedtime  cyanocobalamin 1000 MICROGram(s) Oral daily  dextrose 5%. 1000 milliLiter(s) (50 mL/Hr) IV Continuous <Continuous>  dextrose 5%. 1000 milliLiter(s) (100 mL/Hr) IV Continuous <Continuous>  dextrose 50% Injectable 25 Gram(s) IV Push once  dextrose 50% Injectable 25 Gram(s) IV Push once  dextrose 50% Injectable 12.5 Gram(s) IV Push once  enoxaparin Injectable 40 milliGRAM(s) SubCutaneous every 24 hours  ferrous    sulfate 325 milliGRAM(s) Oral two times a day  folic acid 1 milliGRAM(s) Oral daily  glucagon  Injectable 1 milliGRAM(s) IntraMuscular once  insulin glargine Injectable (LANTUS) 15 Unit(s) SubCutaneous two times a day  insulin lispro (ADMELOG) corrective regimen sliding scale   SubCutaneous Before meals and at bedtime  levothyroxine 75 MICROGram(s) Oral daily  midodrine. 15 milliGRAM(s) Oral every 8 hours    MEDICATIONS  (PRN):  bisacodyl Suppository 10 milliGRAM(s) Rectal daily PRN If no bowel movement  dextrose Oral Gel 15 Gram(s) Oral once PRN Blood Glucose LESS THAN 70 milliGRAM(s)/deciliter  magnesium hydroxide Suspension 30 milliLiter(s) Oral daily PRN Constipation  ondansetron Injectable 4 milliGRAM(s) IV Push every 6 hours PRN Nausea and/or Vomiting  oxyCODONE    IR 10 milliGRAM(s) Oral every 3 hours PRN Moderate Pain (4 - 6)      I&O's Summary    14 Jul 2022 07:01  -  15 Jul 2022 07:00  --------------------------------------------------------  IN: 0 mL / OUT: 2301 mL / NET: -2301 mL       HPI  Pt is a 52yo M admitted to [facility] for left hip fx s/p ORIF.   Pt was seen and examined at bedside.  Patient was able to walk , with a walker with no orthostatic symptoms   PHYSICAL EXAM:  Vital Signs Last 24 Hrs  T(C): 36.6 (15 Jul 2022 08:03), Max: 36.8 (14 Jul 2022 10:05)  T(F): 97.9 (15 Jul 2022 08:03), Max: 98.2 (14 Jul 2022 10:05)  HR: 80 (15 Jul 2022 09:22) (66 - 105)  BP: 140/62 (15 Jul 2022 09:22) (92/65 - 181/79)  BP(mean): --  RR: 18 (15 Jul 2022 08:03) (18 - 19)  SpO2: 98% (15 Jul 2022 08:03) (97% - 100%)  Parameters below as of 15 Jul 2022 08:03  Patient On (Oxygen Delivery Method): room air  Constitutional: NAD, awake    HEENT: PERR, EOMI,   Neck: Soft and supple,    Respiratory: Breath sounds are clear bilaterally,   Cardiovascular: S1 and S2,    Gastrointestinal: Bowel Sounds present, soft, nontender,    Extremities: left lower ext +2   Left legs wound: clean   Vascular: 2+ peripheral pulses  Neurological: A/O x 3,                                     8.7    8.36  )-----------( 292      ( 15 Jul 2022 07:11 )             26.5     07-15    134<L>  |  99  |  17.1  ----------------------------<  211<H>  4.3   |  24.0  |  1.41<H>    Ca    8.3<L>      15 Jul 2022 07:11  Phos  3.2     07-14  Mg     1.6     07-14    TPro  5.6<L>  /  Alb  2.7<L>  /  TBili  0.8  /  DBili  x   /  AST  16  /  ALT  11  /  AlkPhos  233<H>  07-15              CAPILLARY BLOOD GLUCOSE      POCT Blood Glucose.: 225 mg/dL (15 Jul 2022 08:00)  POCT Blood Glucose.: 185 mg/dL (14 Jul 2022 22:39)  POCT Blood Glucose.: 238 mg/dL (14 Jul 2022 16:55)  POCT Blood Glucose.: 213 mg/dL (14 Jul 2022 12:39)      MEDICATIONS  (STANDING):  aMIOdarone    Tablet 100 milliGRAM(s) Oral daily  aspirin enteric coated 81 milliGRAM(s) Oral daily  atorvastatin 20 milliGRAM(s) Oral at bedtime  cyanocobalamin 1000 MICROGram(s) Oral daily  dextrose 5%. 1000 milliLiter(s) (50 mL/Hr) IV Continuous <Continuous>  dextrose 5%. 1000 milliLiter(s) (100 mL/Hr) IV Continuous <Continuous>  dextrose 50% Injectable 25 Gram(s) IV Push once  dextrose 50% Injectable 25 Gram(s) IV Push once  dextrose 50% Injectable 12.5 Gram(s) IV Push once  enoxaparin Injectable 40 milliGRAM(s) SubCutaneous every 24 hours  ferrous    sulfate 325 milliGRAM(s) Oral two times a day  folic acid 1 milliGRAM(s) Oral daily  glucagon  Injectable 1 milliGRAM(s) IntraMuscular once  insulin glargine Injectable (LANTUS) 15 Unit(s) SubCutaneous two times a day  insulin lispro (ADMELOG) corrective regimen sliding scale   SubCutaneous Before meals and at bedtime  levothyroxine 75 MICROGram(s) Oral daily  midodrine. 15 milliGRAM(s) Oral every 8 hours    MEDICATIONS  (PRN):  bisacodyl Suppository 10 milliGRAM(s) Rectal daily PRN If no bowel movement  dextrose Oral Gel 15 Gram(s) Oral once PRN Blood Glucose LESS THAN 70 milliGRAM(s)/deciliter  magnesium hydroxide Suspension 30 milliLiter(s) Oral daily PRN Constipation  ondansetron Injectable 4 milliGRAM(s) IV Push every 6 hours PRN Nausea and/or Vomiting  oxyCODONE    IR 10 milliGRAM(s) Oral every 3 hours PRN Moderate Pain (4 - 6)      I&O's Summary    14 Jul 2022 07:01  -  15 Jul 2022 07:00  --------------------------------------------------------  IN: 0 mL / OUT: 2301 mL / NET: -2301 mL    Summary:   1. Left ventricular ejection fraction, by visual estimation, is 60 to   65%.   2. Normal global left ventricular systolic function.   3. Normal left ventricular internal cavity size.   4. Normal right ventricular size and function.   5. Normal left atrial size.   6. Rheumatic mitral valve.   7. Moderate thickening and calcification of the anterior mitral valve   leaflet.   8. Mobile structure noted on the anterior mitral leaflet likely   represents loose calcium.   9. Trace mitral valve regurgitation.  10. Mild aortic valve stenosis.  11. Lipomatous hypertrophy of the intra-atrial septum.  12. There is no evidence of pericardial effusion.

## 2022-07-15 NOTE — CONSULT NOTE ADULT - NS ATTEND AMEND GEN_ALL_CORE FT
Orthopaedic Trauma Surgeon Addendum:     I have reviewed the physician assistant note and agree with the history, exam, and plan of care, except as noted.    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  Review of the outside chart documents significant, untreated Vitamin D deficiency, CKD, DM and other PMH in addition to the HTN that was stated by the ED.  Will need endocrine consult for treatment of his metabolic issues. He will need nonunion repair and  treatment of the new fracture as well. Orthopedic Surgery is ready to proceed with surgery pending medical optimization and adequate Operating Room availability. Risks of surgical delay discussed with other providers and staff. Please call with any questions or concerns.     Gary Kay MD  Orthopaedic Trauma Surgeon  [facility] [place]
Patient with known hypotension episodes and on midodrine.   based on review of testing, no further inpatient workup.     Continue current therapy.     Will sign off.

## 2022-07-15 NOTE — CONSULT NOTE ADULT - ASSESSMENT
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

## 2022-07-15 NOTE — PROGRESS NOTE ADULT - SUBJECTIVE AND OBJECTIVE BOX
Patient seen and eval at bedside. Patient has no complaints, pain well controlled, able to get up with PT with walker. Patient awaiting rehab placement. Denies CP ,SOB, dizziness, but does have soem numbness in feet however states it is from his peripheral neuroapthy secondary to DM.    Vital Signs Last 24 Hrs  T(C): 36.6 (15 Jul 2022 08:03), Max: 36.8 (14 Jul 2022 10:05)  T(F): 97.9 (15 Jul 2022 08:03), Max: 98.2 (14 Jul 2022 10:05)  HR: 66 (15 Jul 2022 08:03) (66 - 105)  BP: 155/83 (15 Jul 2022 08:03) (109/65 - 181/79)  BP(mean): --  RR: 18 (15 Jul 2022 08:03) (18 - 19)  SpO2: 98% (15 Jul 2022 08:03) (97% - 100%)    PE: NAD, alert awake  Left LE: Dressing has some mild serous staining otherwise C/D/I  EHL/TA/GS/FHL intact, Gross SILT s/s/DP/SP/tib distrib  DP pulse 2+, calft soft, NT B/L                          8.7    8.36  )-----------( 292      ( 15 Jul 2022 07:11 )             26.5     A/P: s/p left femur IMN replacement for nonunion POD#10  Pain control  DVT propx: LOV/ASA  PT/OT - WBAT  Dressing left thigh changed - betadine swab applied, dried and incision covered with gauze and Tegaderm Staples to be removed POD#21  Cont care as per primary team  Ortho stable

## 2022-07-15 NOTE — PROGRESS NOTE ADULT - ASSESSMENT
Neurology Progress Note      Chief Complaint:  F/u stroke    Subjective     Subjective:  Lying in bed. No family/friends at bedside. Patient states he lives alone. complaints of HA and states can get relief with tylenol. Continues with some dysarthric speech and disrupted anoop. Stated had some dizziness when ambulating last PM.   CT head done yesterday stable.       Addendum: 1018: RN caring for patient reported elevated /100. She rechecked and improved to 150/93. At that time patient complaining of paresthesia of left arm. NIHSS unchanged. Will get CT head.       Medications:  Current Facility-Administered Medications   Medication Dose Route Frequency Provider Last Rate Last Dose   • acetaminophen (TYLENOL) tablet 650 mg  650 mg Oral Q6H PRN Hira Montenegro MD   650 mg at 01/16/20 1939   • amLODIPine (NORVASC) tablet 5 mg  5 mg Oral Q24H Hira Montenegro MD   5 mg at 01/16/20 0901   • aspirin chewable tablet 81 mg  81 mg Oral Daily Hira Montenegro MD   81 mg at 01/16/20 0901    Or   • aspirin suppository 300 mg  300 mg Rectal Daily Hira Montenegro MD       • atorvastatin (LIPITOR) tablet 80 mg  80 mg Oral Nightly Hira Montenegro MD   80 mg at 01/16/20 2000   • ipratropium-albuterol (DUO-NEB) nebulizer solution 3 mL  3 mL Nebulization Q4H PRN Hira Montenegro MD       • levothyroxine (SYNTHROID, LEVOTHROID) tablet 125 mcg  125 mcg Oral Q AM Hira Montenegro MD   125 mcg at 01/17/20 0544   • lisinopril (PRINIVIL,ZESTRIL) tablet 20 mg  20 mg Oral Q24H Hira Montenegro MD   20 mg at 01/16/20 0901   • ondansetron (ZOFRAN) tablet 4 mg  4 mg Oral Q6H PRN Hira Montenegro MD        Or   • ondansetron (ZOFRAN) injection 4 mg  4 mg Intravenous Q6H PRN Hira Montenegro MD   4 mg at 01/14/20 1024   • oseltamivir (TAMIFLU) capsule 75 mg  75 mg Oral Q12H Hira Montenegro MD   75 mg at 01/16/20 2124   • sodium chloride 0.9 % flush 10 mL  10 mL Intravenous PRN Hira Montenegro MD        • sodium chloride 0.9 % flush 10 mL  10 mL Intravenous Q12H Hira Montenegro MD   10 mL at 01/16/20 2000   • sodium chloride 0.9 % flush 10 mL  10 mL Intravenous PRN Hira Montenegro MD           Review of Systems:   -A 14 point review of systems is completed and is negative except for frontal headache.       Objective      Vital Signs  Temp:  [97.6 °F (36.4 °C)-98 °F (36.7 °C)] 98 °F (36.7 °C)  Heart Rate:  [51-76] 61  Resp:  [16-18] 16  BP: (120-153)/() 149/97    Physical Exam:  General Exam:  Head:  Normal cephalic, atraumatic  HEENT:  Neck supple  Fundoscopic Exam:  No signs of disc edema  CVS:  Regular rate and rhythm.  No murmurs  Carotid Examination:  No bruits  Lungs:  Clear to auscultation  Abdomen:  Non-tender, Non-distended  Extremities:  No signs of peripheral edema  Skin:  No rashes    Neurologic Exam:    Mental Status:    -Awake, Alert, Oriented X 3 with some cognitive slowing. Unsure of his baseline.   -No word finding difficulties  -No aphasia  -some dysarthria and impaired anoop.   -Follows simple and complex commands    CN II:  Visual fields full.  Pupils equally reactive to light  CN III, IV, VI:  Extraocular Muscles full with no signs of nystagmus  CN V:  Facial sensory is symmetric with no asymmetries.  CN VII:  Facial motor symmetric  CN VIII:  Gross hearing intact bilaterally  CN IX:  Palate elevates symmetrically  CN X:  Palate elevates symmetrically  CN XI:  Shoulder shrug symmetric  CN XII:  Tongue protrudes to midline  Motor: (strength out of 5:  1= minimal movement, 2 = movement in plane of gravity, 3 = movement against gravity, 4 = movement against some resistance, 5 = full strength)    -Right Upper Ext: Proximal: 5 Distal: 5  -Left Upper Ext: Proximal: 5 Distal: 5    -Right Lower Ext: Proximal: 5 Distal: 5  -Left Lower Ext: Proximal: 5 Distal: 5    DTR:  -Right   Bicep: 2+ Tricep: 2+ Brachoradialis: 2+   Patella: 2+ Ankle: 2+ Neg Babinski  -Left   Bicep: 2+ Tricep:  Pt is 50yo M w history of diabetes,  A. fib, depression, hypotension, HLD, hypothyroid initially presented to [facility] with complaints of pain in his left hip. Patient reports that he was trying to get out the pool and then he misplaced his foot and fell down on to his left hip. Patient complaints of pain at hip site. No other complaints. Of note, patient had a hip fracture of his left hip in October 2021 and was repaired at [facility]. Patient was transferred to [facility] for evaluation. Orthopedist requested admission to medicine for medical management.    #Left femur fracture s/p ORIF POD5  Ortho consult noted   Pain control  WBAT LLE   cont PT if pt able to tolerate, wouls suggest PT in the Pm only   Prevena vac removed 7/10   pending acute rehab      #Orthostatic hypotension   Multifactorial,  Likely Autonomic dysfunction from uncontrolled DM , Dehydration, Hypovolemia, Symptomatic anemia   Ordering ECHo   home dose 10mg   increased to 15mg 7/8  Trendelenburg as needed    Advice pt bring abdominal binder from home   bilateral lower ext ACE wrap at all time when awake  PT only in the pm   Given 1 PRBC , IV fluids Patient less symptomatics .       #Anemia, postop acute blood loss  Symptomatic   hem dropped from admission and them remained stable but Symptomatic   ordering iron panel. Ferritin, B12, Folic Acid  Patient  symptomatic.   monitor hemoglobin   -transfused 1 PRBC blood transfusion     #DM - uncontrolled   Atypical insulin regimen at home  ISS, monitor BS   A1C -9  hypoglycemia episode noted in the afternoon   DC premeal, will adjust Lantus pending PM BGM        #Hyponatremia  Corrected sodium is 132->133->134  Monitor BMP    #RAVEN on CKD3  Monitor Renal function  avoid nephrotoxics. monitor intake and output.   Creatinine     #Afib  S/p ablation  C/w amiodarone  Not on AC    #Hypothyroid  C/w Synthroid    #HLD  Atorvastatin    DVT prophylaxis: Lovenox x 4 weeks  pending acute rehab  2+ Brachoradialis: 2+   Patella: 2+ Ankle: 2+ Neg Babinski    Sensory:  -Intact to light touch, pinprick, temperature, pain, and proprioception    Coordination/gait:  -Finger to nose with dysmetria on right and normal on left. Finger tapping appears improved compared to yesterday  -Heel to shin mild ataxia on right compared to left.       Gait  -No signs of ataxia  -ambulates unassisted         Results Review:    I reviewed the patient's new clinical results.    Results from last 7 days   Lab Units 01/17/20  0436 01/14/20  0247 01/13/20  0747   WBC 10*3/mm3 7.67 16.57* 10.87*   HEMOGLOBIN g/dL 17.6 17.9* 19.3*   HEMATOCRIT % 51.3* 52.8* 55.4*   PLATELETS 10*3/mm3 254 253 280        Results from last 7 days   Lab Units 01/17/20  0436 01/16/20  0317 01/15/20  0809 01/14/20  0247 01/13/20  0815 01/13/20  0747   SODIUM mmol/L 139 144 142 142  --  138   POTASSIUM mmol/L 4.2  --   --  4.0  --  3.7   CHLORIDE mmol/L 105  --   --  104  --  102   CO2 mmol/L 26.0  --   --  26.0  --  25.0   BUN mg/dL 17  --   --  22  --  15   CREATININE mg/dL 0.81  --   --  1.15 1.10 1.03   CALCIUM mg/dL 9.1  --   --  9.6  --  9.7   BILIRUBIN mg/dL  --   --   --  0.6  --  0.8   ALK PHOS U/L  --   --   --  41  --  48   ALT (SGPT) U/L  --   --   --  22  --  24   AST (SGOT) U/L  --   --   --  22  --  24   GLUCOSE mg/dL 96  --   --  131*  --  130*        Lab Results   Component Value Date    PROTIME 12.3 01/13/2020    INR 0.89 (L) 01/13/2020     No components found for: POCGLUC  No components found for: A1C  Lab Results   Component Value Date    HDL 53 01/14/2020    LDL 91 01/14/2020     No components found for: B12  Lab Results   Component Value Date    TSH 94.580 (H) 01/13/2020       Imaging Results (Last 24 Hours)     Procedure Component Value Units Date/Time    CT Head Without Contrast [[id number]] Collected:  01/16/20 1506     Updated:  01/16/20 1512    Narrative:       EXAMINATION: CT head without contrast 01/16/2020     HISTORY: Stroke  symptoms.     DOSE: 702 mGycm. Automated exposure control was utilized to diminish  patient radiation dose..     FINDINGS: Today's exam is compared to previous study dated one day  earlier. Multiple contiguous axial images are obtained from skull base  to the vertex per protocol without intravenous contrast-enhancement with  reformatted images obtained in the sagittal and coronal projections from  the original data set.     There is atrophy of the brain with mild small vessel ischemic disease  involving the periventricular and higher white matter tracts. There is  again evidence of an acute infarct involving the right cerebellar  hemisphere and specifically the superior cerebellar distribution  abutting the tentorium cerebelli. There is some mass effect on the  fourth ventricle unchanged from the previous study. There is no evidence  of hemorrhagic conversion. No additional infarcts are observed.  Previously noted small left-sided subdural hematoma is no longer  visualized.       Impression:       1.. Stable right cerebellar hemisphere infarct from the previous study  of 01/15/2020. There is some mild mass effect on the fourth ventricle  but this is stable from the previous exam. No evidence of hemorrhagic  conversion.  2. Previously described left-sided subdural hematoma is no longer  visualized  This report was finalized on 01/16/2020 15:09 by Dr. Paul Harper MD.            Ct head personally reviewed by me and stable right cerebellar stroke, no hemorrhage. Left SDH not visualized.     Assessment/Plan     Hospital Problem List      Chronic obstructive pulmonary disease (CMS/HCC)    CVA (cerebral vascular accident) (CMS/HCC)    Influenza A    Tobacco dependence    Acute encephalopathy    Hypothyroidism, non-compliant with medication     Medical non-compliance    Bradycardia    Impression:  1. Acute right cerebellar stroke which is evolving and showing greater edema compared to study 1/13/20  No hemorrhagic  component seen on Head CT as was seen on MRI  2.SDH not visualized on CT head yesterday 1/16/2020.   3. Still a risk for requiring decompression and will need close Neuro monitoring  4. Hypothyroid  5. Cognitive slowing since admission  Unknown baseline  6. Normal hemoglobin A1C  7. LDL of 93 and goal of < 70  8. Headache- can get relief with tylenol.     Plan:  1. Stable CT head and stable neurologic exam with some improvement.  2. Continue ASA 81 mg daily  3. Continue Lipitor 80 mg daily. LDL goal less than 70.  4. Discharge plan to acute rehab. Referral to [facility] has been placed.         Ashley Barraza, APRN  01/17/20  8:38 AM   Pt is 52yo M w history of diabetes,  A. fib, depression, hypotension, HLD, hypothyroid initially presented to [facility] with complaints of pain in his left hip. Patient reports that he was trying to get out the pool and then he misplaced his foot and fell down on to his left hip. Patient complaints of pain at hip site. No other complaints. Of note, patient had a hip fracture of his left hip in October 2021 and was repaired at [facility]. Patient was transferred to [facility] for evaluation. Orthopedist requested admission to medicine for medical management.    #Left femur fracture s/p ORIF POD5  Ortho consult noted   Pain control  WBAT LLE   cont PT if pt able to tolerate, would suggest PT in the Pm only   Prevena vac removed 7/10   pending acute rehab      #Chronic Orthostatic hypotension   Multifactorial,  Likely Autonomic dysfunction from uncontrolled DM , Dehydration, Hypovolemia, Symptomatic anemia   ECHO Reviewed   home dose 10mg   increased to 15mg 7/8`  Trendelenburg as needed    Advice pt bring abdominal binder from home   bilateral lower ext ACE wrap at all time when awake  PT only in the pm   Given 1 PRBC , IV fluids given   Patient is asymptomatic   Cardiology Consulted   Mobile structure noted on the anterior mitral leaflet likely   represents loose calcium.        #Anemia, postop acute blood loss  Symptomatic   hem dropped from admission and them remained stable but Symptomatic   iron panel. consistent with ACD plus iron def, low ferritin with low TIBC   Patient  symptomatic.   monitor hemoglobin   -transfused 1 PRBC blood transfusion     #DM - uncontrolled   Atypical insulin regimen at home  ISS, monitor BS   A1C -9  hypoglycemia episode noted in the afternoon   DC premeal, will adjust Lantus pending PM BGM        #Hyponatremia  Corrected sodium is 132->133->134  Monitor BMP    #RAVEN on CKD3  Monitor Renal function  avoid nephrotoxics. monitor intake and output.   Creatinine     #Afib  S/p ablation  C/w amiodarone  Not on AC    #Hypothyroid  elevated TSH   increased  Synthroid to 75mcg     #HLD  Atorvastatin    DVT prophylaxis: Lovenox x 4 weeks  Patient will discharge Acute rehab

## 2022-07-15 NOTE — CONSULT NOTE ADULT - SUBJECTIVE AND OBJECTIVE BOX
[facility]                                              CARDIOLOGY AT [address], [place] [address]                                             Telephone: [phone]. Fax:[phone]                                                       CARDIOLOGY CONSULTATION NOTE                                                                                             History obtained by: Patient and medical record  Community Cardiologist: Dr. Gary Patterson   obtained: Yes [  ] No [ x ]  Reason for Consultation: orthostatic hypotension  Available out pt records reviewed: Yes [  ] No [ x ]    Chief complaint:    Patient is a 51y old  Male who presents with a chief complaint of Left hip Fracture (15 Jul 2022 10:32)      HPI:   50 y/o M with PMH DM2, Afib (s/p ablation, off Eliquis), depression, hypotension, DAYANARA, HLD and hypothyroid who presented to [facility] ED s/p mechanical fall and fracture of previously [place] [facility] [place] who is now POD#10 from 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 He reports that he has had about 20 syncopal episodes over the past 2.5 years. He states that he can usually tell when a syncopal episode is going to occur because he becomes lightheaded and dizzy and will usually try to find a safe spot to fall. He reports complete loss of consciousness during these episodes, which are occasionally witnessed by his . The episodes occur usually while going from sitting to standing, but also occur at any time. No reports of bowel or bladder incontinence.  He saw a neurologist briefly who conducted EEG but was normal in Dec 2020. He was advised to follow up with cardiology for pAF.  He follows regularly with Dr. Patterson. He had an ablation in the past for Afib and had a loop recorder for 3 years which has since been removed and he is no longer on AC. On telemetry, he was noted to be in NSR without arrythmia. H&H low post op and he was transfused 1 unit PRBCs yesterday, however he is still having orthostatic hypotension and in spite of midodrine. Denies chest pain, back pain, headache, dizziness, SOB, FAGAN, diaphoresis, syncope or N/V.      CARDIAC TESTING   ECHO:  < from: TTE Echo Complete w/o Contrast w/ Doppler (07.14.22 @ 11:35) >  PHYSICIAN INTERPRETATION:  Left Ventricle: The left ventricular internal cavity size is normal. Left   ventricular wall thickness is normal.  Global LV systolic function was normal. Left ventricular ejection   fraction, by visual estimation, is 60 to 65%. Spectral Doppler shows   normal pattern of LV diastolic filling.  Right Ventricle: Normal right ventricular size and function.  Left Atrium: Normal left atrial size. Lipomatous hypertrophy of the   intra-atrial septum.  Right Atrium: The right atrium is normal in size.  Pericardium: There is no evidence of pericardial effusion. There is a   significant pericardial fat pad present.  Mitral Valve: The mitral valve is rheumatic. Moderate thickening and   calcification of the anterior mitral valve leaflet. Trace mitral valve   regurgitation is seen. Mobile structure noted on the anterior mitral   leaflet likely represents loose calcium.  Tricuspid Valve: The tricuspid valve is normal in structure. Trivial   tricuspid regurgitation is visualized. Adequate TR velocity was not   obtained to accurately assess RVSP.  Aortic Valve: The aortic valve is trileaflet. Mild aortic stenosis is   present. No evidence of aortic valve regurgitation is seen.  Pulmonic Valve: The pulmonic valve was not well visualized.  Aorta: The aortic root and ascending aorta are structurally normal, with   no evidence of dilitation.  Venous: The inferior vena cava was normal sized, with respiratory size   variation greater than 50%.      Summary:   1. Left ventricular ejection fraction, by visual estimation, is 60 to   65%.   2. Normal global left ventricular systolic function.   3. Normal left ventricular internal cavity size.   4. Normal right ventricular size and function.   5. Normal left atrial size.   6. Rheumatic mitral valve.   7. Moderate thickening and calcification of the anterior mitral valve   leaflet.   8. Mobile structure noted on the anterior mitral leaflet likely   represents loose calcium.   9. Trace mitral valve regurgitation.  10. Mild aortic valve stenosis.  11. Lipomatous hypertrophy of the intra-atrial septum.  12. There is no evidence of pericardial effusion.    MD Bela Electronically signed on 7/14/2022 at 5:35:39 PM    < end of copied text >    STRESS:    CATH:     ELECTROPHYSIOLOGY:     PAST MEDICAL HISTORY  DM (diabetes mellitus)    PAST SURGICAL HISTORY  History of left hip replacement    SOCIAL HISTORY:  Denies smoking/alcohol/drugs  CIGARETTES:     ALCOHOL: social ETOH  DRUGS:    FAMILY HISTORY:  FH: lung cancer (Mother)  FH: prostate cancer (Father)    Family History of Cardiovascular Disease:  Yes [  ] No [x  ]  Coronary Artery Disease in first degree relative: Yes [  ] No [ x ]  Sudden Cardiac Death in First degree relative: Yes [  ] No [x  ]    HOME MEDICATIONS:  amiodarone 200 mg oral tablet: 1 tab(s) orally once a day (02 Jul 2022 16:21)  aspirin 81 mg oral delayed release tablet: 1 tab(s) orally once a day (02 Jul 2022 16:21)  atorvastatin 20 mg oral tablet: 1 tab(s) orally once a day (at bedtime) (13 Jul 2022 08:41)  Basaglar KwikPen 100 units/mL subcutaneous solution: 17 to 20 unit(s) subcutaneous 2 times a day (02 Jul 2022 16:21)  bisacodyl 10 mg rectal suppository: 1 suppository(ies) rectal once a day, As needed, If no bowel movement (13 Jul 2022 08:41)  Crestor 5 mg oral tablet: 1 tab(s) orally every 48 hours (02 Jul 2022 16:21)  desvenlafaxine (as base) 100 mg oral tablet, extended release: 1 tab(s) orally once a day (02 Jul 2022 16:21)  enoxaparin: 40 milligram(s) subcutaneous once a day (13 Jul 2022 08:41)  ferrous sulfate 325 mg (65 mg elemental iron) oral tablet: 1 tab(s) orally 3 times a day (02 Jul 2022 16:21)  folic acid 1 mg oral tablet: 1 tab(s) orally once a day (02 Jul 2022 16:21)  levothyroxine 75 mcg (0.075 mg) oral tablet: 1 tab(s) orally once a day (15 Jul 2022 09:20)  magnesium hydroxide 8% oral suspension: 30 milliliter(s) orally once a day, As needed, Constipation (13 Jul 2022 08:41)  midodrine 5 mg oral tablet: 3 tab(s) orally every 8 hours (13 Jul 2022 08:41)  NovoLOG 100 units/mL subcutaneous solution: 7 to 8 unit(s) subcutaneous 3 times a day (with meals) (02 Jul 2022 16:21)  Vitamin B12 1000 mcg oral tablet: 1 tab(s) orally once a day (02 Jul 2022 16:21)  Vitamin D3 125 mcg (5000 intl units) oral capsule: 1 cap(s) orally once a day (02 Jul 2022 16:21)      CURRENT CARDIAC MEDICATIONS:  aMIOdarone    Tablet 100 milliGRAM(s) Oral daily  midodrine. 15 milliGRAM(s) Oral every 8 hours      CURRENT OTHER MEDICATIONS:  ondansetron Injectable 4 milliGRAM(s) IV Push every 6 hours PRN Nausea and/or Vomiting  oxyCODONE    IR 10 milliGRAM(s) Oral every 3 hours PRN Moderate Pain (4 - 6)  bisacodyl Suppository 10 milliGRAM(s) Rectal daily PRN If no bowel movement  magnesium hydroxide Suspension 30 milliLiter(s) Oral daily PRN Constipation  AQUAPHOR (petrolatum Ointment) 1 Application(s) Topical two times a day  aspirin enteric coated 81 milliGRAM(s) Oral daily  atorvastatin 20 milliGRAM(s) Oral at bedtime  cyanocobalamin 1000 MICROGram(s) Oral daily  dextrose 5%. 1000 milliLiter(s) (50 mL/Hr) IV Continuous <Continuous>  dextrose 5%. 1000 milliLiter(s) (100 mL/Hr) IV Continuous <Continuous>  dextrose 50% Injectable 25 Gram(s) IV Push once, Stop order after: 1 Doses  dextrose 50% Injectable 25 Gram(s) IV Push once, Stop order after: 1 Doses  dextrose 50% Injectable 12.5 Gram(s) IV Push once, Stop order after: 1 Doses  dextrose Oral Gel 15 Gram(s) Oral once, Stop order after: 1 Doses PRN Blood Glucose LESS THAN 70 milliGRAM(s)/deciliter  enoxaparin Injectable 40 milliGRAM(s) SubCutaneous every 24 hours  ferrous    sulfate 325 milliGRAM(s) Oral two times a day  ferrous    sulfate 325 milliGRAM(s) Oral two times a day  folic acid 1 milliGRAM(s) Oral daily  glucagon  Injectable 1 milliGRAM(s) IntraMuscular once, Stop order after: 1 Doses  insulin glargine Injectable (LANTUS) 15 Unit(s) SubCutaneous two times a day  insulin lispro (ADMELOG) corrective regimen sliding scale   SubCutaneous Before meals and at bedtime  iron sucrose Injectable 100 milliGRAM(s) IV Push every 24 hours, Stop order after: 2 Days  levothyroxine 75 MICROGram(s) Oral daily      ALLERGIES:   Allergy Status Unknown      REVIEW OF SYMPTOMS:   CONSTITUTIONAL: No fever, no chills, no weight loss, no weight gain, no fatigue   ENMT:  No vertigo; No sinus or throat pain  NECK: No pain or stiffness  CARDIOVASCULAR: No chest pain, no dyspnea, no syncope/presyncope, no palpitations, no dizziness, no Orthopnea, no Paroxsymal nocturnal dyspnea  RESPIRATORY: no Shortness of breath, no cough, no wheezing  : No dysuria, no hematuria   GI: No dark color stool, no nausea, no diarrhea, no constipation, no abdominal pain   NEURO: No headache, no slurred speech   MUSCULOSKELETAL: No joint pain or swelling; No muscle, back, or extremity pain  PSYCH: No agitation, no anxiety.    ALL OTHER REVIEW OF SYSTEMS ARE NEGATIVE.    VITAL SIGNS:  T(C): 36.6 (07-15-22 @ 08:03), Max: 36.7 (07-14-22 @ 16:21)  T(F): 97.9 (07-15-22 @ 08:03), Max: 98 (07-14-22 @ 16:21)  HR: 80 (07-15-22 @ 09:22) (66 - 85)  BP: 140/62 (07-15-22 @ 09:22) (92/65 - 181/79)  RR: 18 (07-15-22 @ 08:03) (18 - 19)  SpO2: 98% (07-15-22 @ 08:03) (98% - 100%)    INTAKE AND OUTPUT:     07-14 @ 07:01  -  07-15 @ 07:00  --------------------------------------------------------  IN: 0 mL / OUT: 2301 mL / NET: -2301 mL        PHYSICAL EXAM:  Constitutional: Comfortable . No acute distress.   HEENT: Atraumatic and normocephalic , neck is supple . no JVD. No carotid bruit.  CNS: A&Ox3. No focal deficits.   Respiratory: CTAB, unlabored   Cardiovascular: RRR normal s1 s2. No murmur. No rubs or gallop.  Gastrointestinal: Soft, non-tender. +Bowel sounds.   Extremities: 2+ Peripheral Pulses, No clubbing, cyanosis, or edema  Psychiatric: Calm . no agitation.   Skin: Warm and dry, no ulcers on extremities     LABS:                            8.7    8.36  )-----------( 292      ( 15 Jul 2022 07:11 )             26.5     07-15    134<L>  |  99  |  17.1  ----------------------------<  211<H>  4.3   |  24.0  |  1.41<H>    Ca    8.3<L>      15 Jul 2022 07:11  Phos  3.2     07-14  Mg     1.6     07-14    TPro  5.6<L>  /  Alb  2.7<L>  /  TBili  0.8  /  DBili  x   /  AST  16  /  ALT  11  /  AlkPhos  233<H>  07-15            Thyroid Stimulating Hormone, Serum: 6.61 uIU/mL (07-15-22 @ 07:11)      INTERPRETATION OF TELEMETRY:     ECG:   Prior ECG: Yes [  ] No [  ]    RADIOLOGY & ADDITIONAL STUDIES:    X-ray:    CT scan:     MRI: report obtained from allscripts  MRI Brain Findings: EXAM: MR ANGIO NECK   EXAM: MR BRAIN     PROCEDURE DATE: 03/13/2021   Clinical indication: Syncope.    MRI of the brain was performed using sagittal T1, axial T1 and fast spin-echo T2-weighted sequence of FLAIR diffusion and susceptibility weighted sequence.    MRA the neck was formed using 2-D and 3-D time flight technique.    This exam is compared with prior brain MRI performed on January 18, 2021    Parenchymal volume loss and chronic microvascular ischemic changes are identified. There is extra-axial fluid identified in the right middle cranial fossa. This is seen medially and is unchanged when compared with the prior exam. This is compatible with an arachnoid cyst and measures approximately 3.9 x 2.8 cm.    The large vessels demonstrate normal flow voids    Bilateral maxillary sinus mucosal thickening is seen.    Mild inflammatory change involving the left mastoid.    Both distal common carotid, proximal internal and external carotid arteries appear normal as well as both vertebral arteries. No significant stenosis is seen.    IMPRESSION: Arachnoid cyst as described above.    Unremarkable MRA of the neck.    US:                                                                                           [facility]                                              CARDIOLOGY AT [address], [place] [address]                                             Telephone: [phone]. Fax:[phone]                                                       CARDIOLOGY CONSULTATION NOTE                                                                                             History obtained by: Patient and medical record  Community Cardiologist: Dr. Gary Patterson   obtained: Yes [  ] No [ x ]  Reason for Consultation: orthostatic hypotension  Available out pt records reviewed: Yes [ x ] No [  ]    Chief complaint:    Patient is a 51y old  Male who presents with a chief complaint of Left hip Fracture (15 Jul 2022 10:32)      HPI:   50 y/o M with PMH DM2, Afib (s/p ablation, off Eliquis), depression, hypotension, DAYANARA, HLD and hypothyroid who presented to [facility] ED s/p mechanical fall and fracture of previously repaired left hip (repaired October 2021 at [facility] [place]) who is now POD#10 from 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 He reports that he has had about 20 syncopal episodes over the past 2.5 years. He states that he can usually tell when a syncopal episode is going to occur because he becomes lightheaded and dizzy and will usually try to find a safe spot to fall. He reports complete loss of consciousness during these episodes, which are occasionally witnessed by his . The episodes occur usually while going from sitting to standing, but also occur at any time. No reports of bowel or bladder incontinence.  He saw a neurologist briefly who conducted EEG but was normal in Dec 2020. He was advised to follow up with cardiology for pAF.  He follows regularly with Dr. Patterson. He had an ablation in the past for Afib and had a loop recorder for 3 years which has since been removed and he is no longer on AC. On telemetry, he was noted to be in NSR without arrythmia. H&H low post op and he was transfused 1 unit PRBCs yesterday, however he is still having orthostatic hypotension and in spite of midodrine. Denies chest pain, back pain, headache, dizziness, SOB, FAGAN, diaphoresis, syncope or N/V.      CARDIAC TESTING   ECHO:  < from: TTE Echo Complete w/o Contrast w/ Doppler (07.14.22 @ 11:35) >  PHYSICIAN INTERPRETATION:  Left Ventricle: The left ventricular internal cavity size is normal. Left   ventricular wall thickness is normal.  Global LV systolic function was normal. Left ventricular ejection   fraction, by visual estimation, is 60 to 65%. Spectral Doppler shows   normal pattern of LV diastolic filling.  Right Ventricle: Normal right ventricular size and function.  Left Atrium: Normal left atrial size. Lipomatous hypertrophy of the   intra-atrial septum.  Right Atrium: The right atrium is normal in size.  Pericardium: There is no evidence of pericardial effusion. There is a   significant pericardial fat pad present.  Mitral Valve: The mitral valve is rheumatic. Moderate thickening and   calcification of the anterior mitral valve leaflet. Trace mitral valve   regurgitation is seen. Mobile structure noted on the anterior mitral   leaflet likely represents loose calcium.  Tricuspid Valve: The tricuspid valve is normal in structure. Trivial   tricuspid regurgitation is visualized. Adequate TR velocity was not   obtained to accurately assess RVSP.  Aortic Valve: The aortic valve is trileaflet. Mild aortic stenosis is   present. No evidence of aortic valve regurgitation is seen.  Pulmonic Valve: The pulmonic valve was not well visualized.  Aorta: The aortic root and ascending aorta are structurally normal, with   no evidence of dilitation.  Venous: The inferior vena cava was normal sized, with respiratory size   variation greater than 50%.      Summary:   1. Left ventricular ejection fraction, by visual estimation, is 60 to   65%.   2. Normal global left ventricular systolic function.   3. Normal left ventricular internal cavity size.   4. Normal right ventricular size and function.   5. Normal left atrial size.   6. Rheumatic mitral valve.   7. Moderate thickening and calcification of the anterior mitral valve   leaflet.   8. Mobile structure noted on the anterior mitral leaflet likely   represents loose calcium.   9. Trace mitral valve regurgitation.  10. Mild aortic valve stenosis.  11. Lipomatous hypertrophy of the intra-atrial septum.  12. There is no evidence of pericardial effusion.    MD Bela Electronically signed on 7/14/2022 at 5:35:39 PM    < end of copied text >    STRESS:    CATH:     ELECTROPHYSIOLOGY:     PAST MEDICAL HISTORY  DM (diabetes mellitus)    PAST SURGICAL HISTORY  History of left hip replacement    SOCIAL HISTORY:  Denies smoking/alcohol/drugs  CIGARETTES:     ALCOHOL: social ETOH  DRUGS:    FAMILY HISTORY:  FH: lung cancer (Mother)  FH: prostate cancer (Father)    Family History of Cardiovascular Disease:  Yes [  ] No [x  ]  Coronary Artery Disease in first degree relative: Yes [  ] No [ x ]  Sudden Cardiac Death in First degree relative: Yes [  ] No [x  ]    HOME MEDICATIONS:  amiodarone 200 mg oral tablet: 1 tab(s) orally once a day (02 Jul 2022 16:21)  aspirin 81 mg oral delayed release tablet: 1 tab(s) orally once a day (02 Jul 2022 16:21)  atorvastatin 20 mg oral tablet: 1 tab(s) orally once a day (at bedtime) (13 Jul 2022 08:41)  Basaglar KwikPen 100 units/mL subcutaneous solution: 17 to 20 unit(s) subcutaneous 2 times a day (02 Jul 2022 16:21)  bisacodyl 10 mg rectal suppository: 1 suppository(ies) rectal once a day, As needed, If no bowel movement (13 Jul 2022 08:41)  Crestor 5 mg oral tablet: 1 tab(s) orally every 48 hours (02 Jul 2022 16:21)  desvenlafaxine (as base) 100 mg oral tablet, extended release: 1 tab(s) orally once a day (02 Jul 2022 16:21)  enoxaparin: 40 milligram(s) subcutaneous once a day (13 Jul 2022 08:41)  ferrous sulfate 325 mg (65 mg elemental iron) oral tablet: 1 tab(s) orally 3 times a day (02 Jul 2022 16:21)  folic acid 1 mg oral tablet: 1 tab(s) orally once a day (02 Jul 2022 16:21)  levothyroxine 75 mcg (0.075 mg) oral tablet: 1 tab(s) orally once a day (15 Jul 2022 09:20)  magnesium hydroxide 8% oral suspension: 30 milliliter(s) orally once a day, As needed, Constipation (13 Jul 2022 08:41)  midodrine 5 mg oral tablet: 3 tab(s) orally every 8 hours (13 Jul 2022 08:41)  NovoLOG 100 units/mL subcutaneous solution: 7 to 8 unit(s) subcutaneous 3 times a day (with meals) (02 Jul 2022 16:21)  Vitamin B12 1000 mcg oral tablet: 1 tab(s) orally once a day (02 Jul 2022 16:21)  Vitamin D3 125 mcg (5000 intl units) oral capsule: 1 cap(s) orally once a day (02 Jul 2022 16:21)      CURRENT CARDIAC MEDICATIONS:  aMIOdarone    Tablet 100 milliGRAM(s) Oral daily  midodrine. 15 milliGRAM(s) Oral every 8 hours      CURRENT OTHER MEDICATIONS:  ondansetron Injectable 4 milliGRAM(s) IV Push every 6 hours PRN Nausea and/or Vomiting  oxyCODONE    IR 10 milliGRAM(s) Oral every 3 hours PRN Moderate Pain (4 - 6)  bisacodyl Suppository 10 milliGRAM(s) Rectal daily PRN If no bowel movement  magnesium hydroxide Suspension 30 milliLiter(s) Oral daily PRN Constipation  AQUAPHOR (petrolatum Ointment) 1 Application(s) Topical two times a day  aspirin enteric coated 81 milliGRAM(s) Oral daily  atorvastatin 20 milliGRAM(s) Oral at bedtime  cyanocobalamin 1000 MICROGram(s) Oral daily  dextrose 5%. 1000 milliLiter(s) (50 mL/Hr) IV Continuous <Continuous>  dextrose 5%. 1000 milliLiter(s) (100 mL/Hr) IV Continuous <Continuous>  dextrose 50% Injectable 25 Gram(s) IV Push once, Stop order after: 1 Doses  dextrose 50% Injectable 25 Gram(s) IV Push once, Stop order after: 1 Doses  dextrose 50% Injectable 12.5 Gram(s) IV Push once, Stop order after: 1 Doses  dextrose Oral Gel 15 Gram(s) Oral once, Stop order after: 1 Doses PRN Blood Glucose LESS THAN 70 milliGRAM(s)/deciliter  enoxaparin Injectable 40 milliGRAM(s) SubCutaneous every 24 hours  ferrous    sulfate 325 milliGRAM(s) Oral two times a day  ferrous    sulfate 325 milliGRAM(s) Oral two times a day  folic acid 1 milliGRAM(s) Oral daily  glucagon  Injectable 1 milliGRAM(s) IntraMuscular once, Stop order after: 1 Doses  insulin glargine Injectable (LANTUS) 15 Unit(s) SubCutaneous two times a day  insulin lispro (ADMELOG) corrective regimen sliding scale   SubCutaneous Before meals and at bedtime  iron sucrose Injectable 100 milliGRAM(s) IV Push every 24 hours, Stop order after: 2 Days  levothyroxine 75 MICROGram(s) Oral daily      ALLERGIES:   Allergy Status Unknown      REVIEW OF SYMPTOMS:   CONSTITUTIONAL: No fever, no chills, no weight loss, no weight gain, no fatigue   ENMT:  No vertigo; No sinus or throat pain  NECK: No pain or stiffness  CARDIOVASCULAR: No chest pain, no dyspnea, no syncope/presyncope, no palpitations, no dizziness, no Orthopnea, no Paroxsymal nocturnal dyspnea  RESPIRATORY: no Shortness of breath, no cough, no wheezing  : No dysuria, no hematuria   GI: No dark color stool, no nausea, no diarrhea, no constipation, no abdominal pain   NEURO: No headache, no slurred speech   MUSCULOSKELETAL: No joint pain or swelling; No muscle, back, or extremity pain  PSYCH: No agitation, no anxiety.    ALL OTHER REVIEW OF SYSTEMS ARE NEGATIVE.    VITAL SIGNS:  T(C): 36.6 (07-15-22 @ 08:03), Max: 36.7 (07-14-22 @ 16:21)  T(F): 97.9 (07-15-22 @ 08:03), Max: 98 (07-14-22 @ 16:21)  HR: 80 (07-15-22 @ 09:22) (66 - 85)  BP: 140/62 (07-15-22 @ 09:22) (92/65 - 181/79)  RR: 18 (07-15-22 @ 08:03) (18 - 19)  SpO2: 98% (07-15-22 @ 08:03) (98% - 100%)    INTAKE AND OUTPUT:     07-14 @ 07:01  -  07-15 @ 07:00  --------------------------------------------------------  IN: 0 mL / OUT: 2301 mL / NET: -2301 mL        PHYSICAL EXAM:  Constitutional: Comfortable . No acute distress.   HEENT: Atraumatic and normocephalic , neck is supple . no JVD. No carotid bruit.  CNS: A&Ox3. No focal deficits.   Respiratory: CTAB, unlabored   Cardiovascular: RRR normal s1 s2. No murmur. No rubs or gallop.  Gastrointestinal: Soft, non-tender. +Bowel sounds.   Extremities: 2+ Peripheral Pulses, No clubbing, cyanosis, or edema  Psychiatric: Calm . no agitation.   Skin: Warm and dry, no ulcers on extremities     LABS:                            8.7    8.36  )-----------( 292      ( 15 Jul 2022 07:11 )             26.5     07-15    134<L>  |  99  |  17.1  ----------------------------<  211<H>  4.3   |  24.0  |  1.41<H>    Ca    8.3<L>      15 Jul 2022 07:11  Phos  3.2     07-14  Mg     1.6     07-14    TPro  5.6<L>  /  Alb  2.7<L>  /  TBili  0.8  /  DBili  x   /  AST  16  /  ALT  11  /  AlkPhos  233<H>  07-15            Thyroid Stimulating Hormone, Serum: 6.61 uIU/mL (07-15-22 @ 07:11)      INTERPRETATION OF TELEMETRY:     ECG: NSR  Prior ECG: Yes [  ] No [  ]    RADIOLOGY & ADDITIONAL STUDIES:    X-ray:    CT scan:     MRI: report obtained from [employer]scripts  MRI Brain Findings: EXAM: MR ANGIO NECK   EXAM: MR BRAIN     PROCEDURE DATE: 03/13/2021   Clinical indication: Syncope.    MRI of the brain was performed using sagittal T1, axial T1 and fast spin-echo T2-weighted sequence of FLAIR diffusion and susceptibility weighted sequence.    MRA the neck was formed using 2-D and 3-D time flight technique.    This exam is compared with prior brain MRI performed on January 18, 2021    Parenchymal volume loss and chronic microvascular ischemic changes are identified. There is extra-axial fluid identified in the right middle cranial fossa. This is seen medially and is unchanged when compared with the prior exam. This is compatible with an arachnoid cyst and measures approximately 3.9 x 2.8 cm.    The large vessels demonstrate normal flow voids    Bilateral maxillary sinus mucosal thickening is seen.    Mild inflammatory change involving the left mastoid.    Both distal common carotid, proximal internal and external carotid arteries appear normal as well as both vertebral arteries. No significant stenosis is seen.    IMPRESSION: Arachnoid cyst as described above.    Unremarkable MRA of the neck.    US:                                                                                           [facility]                                              CARDIOLOGY AT [address], [place] [address]                                             Telephone: [phone]. Fax:[phone]                                                       CARDIOLOGY CONSULTATION NOTE                                                                                             History obtained by: Patient and medical record  Community Cardiologist: Dr. Gary Patterson   obtained: Yes [  ] No [ x ]  Reason for Consultation: orthostatic hypotension  Available out pt records reviewed: Yes [ x ] No [  ]    Chief complaint:    Patient is a 51y old  Male who presents with a chief complaint of Left hip Fracture (15 Jul 2022 10:32)      HPI:   50 y/o M with PMH DM1, Afib (s/p ablation 2017, off Eliquis), depression, hypotension, DAYANARA, HLD and hypothyroid who presented to [facility] ED s/p mechanical fall and fracture of previously repaired left hip (repaired October 2021 at [facility] [place] [facility]) who is now POD#10 from 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 He reports that he has had about 20 syncopal episodes over the past 2.5 years. He states that he can usually tell when a syncopal episode is going to occur because he becomes lightheaded and dizzy and will usually try to find a safe spot to fall. He reports complete loss of consciousness during these episodes, which are occasionally witnessed by his . The episodes occur usually while going from sitting to standing, but also occur at any time. No reports of bowel or bladder incontinence.  He saw a neurologist briefly who conducted EEG but was normal in Dec 2020. He was advised to follow up with cardiology for pAF.  He follows regularly with Dr. Patterson. He had an ablation in the past for Afib and had a loop recorder for 3 years which has since been removed and he is no longer on AC. On telemetry, he was noted to be in NSR without arrythmia. H&H low post op and he was transfused 1 unit PRBCs yesterday, however he is still having orthostatic hypotension and in spite of midodrine. Denies chest pain, back pain, headache, dizziness, SOB, FAGAN, diaphoresis, syncope or N/V.      CARDIAC TESTING   ECHO:  < from: TTE Echo Complete w/o Contrast w/ Doppler (07.14.22 @ 11:35) >  PHYSICIAN INTERPRETATION:  Left Ventricle: The left ventricular internal cavity size is normal. Left   ventricular wall thickness is normal.  Global LV systolic function was normal. Left ventricular ejection   fraction, by visual estimation, is 60 to 65%. Spectral Doppler shows   normal pattern of LV diastolic filling.  Right Ventricle: Normal right ventricular size and function.  Left Atrium: Normal left atrial size. Lipomatous hypertrophy of the   intra-atrial septum.  Right Atrium: The right atrium is normal in size.  Pericardium: There is no evidence of pericardial effusion. There is a   significant pericardial fat pad present.  Mitral Valve: The mitral valve is rheumatic. Moderate thickening and   calcification of the anterior mitral valve leaflet. Trace mitral valve   regurgitation is seen. Mobile structure noted on the anterior mitral   leaflet likely represents loose calcium.  Tricuspid Valve: The tricuspid valve is normal in structure. Trivial   tricuspid regurgitation is visualized. Adequate TR velocity was not   obtained to accurately assess RVSP.  Aortic Valve: The aortic valve is trileaflet. Mild aortic stenosis is   present. No evidence of aortic valve regurgitation is seen.  Pulmonic Valve: The pulmonic valve was not well visualized.  Aorta: The aortic root and ascending aorta are structurally normal, with   no evidence of dilitation.  Venous: The inferior vena cava was normal sized, with respiratory size   variation greater than 50%.      Summary:   1. Left ventricular ejection fraction, by visual estimation, is 60 to   65%.   2. Normal global left ventricular systolic function.   3. Normal left ventricular internal cavity size.   4. Normal right ventricular size and function.   5. Normal left atrial size.   6. Rheumatic mitral valve.   7. Moderate thickening and calcification of the anterior mitral valve   leaflet.   8. Mobile structure noted on the anterior mitral leaflet likely   represents loose calcium.   9. Trace mitral valve regurgitation.  10. Mild aortic valve stenosis.  11. Lipomatous hypertrophy of the intra-atrial septum.  12. There is no evidence of pericardial effusion.    MD Bela Electronically signed on 7/14/2022 at 5:35:39 PM    < end of copied text >    STRESS:    CATH:     ELECTROPHYSIOLOGY:     PAST MEDICAL HISTORY  DM (diabetes mellitus)    PAST SURGICAL HISTORY  History of left hip replacement    SOCIAL HISTORY:  Denies smoking/alcohol/drugs  CIGARETTES:     ALCOHOL: social ETOH  DRUGS:    FAMILY HISTORY:  FH: lung cancer (Mother)  FH: prostate cancer (Father)    Family History of Cardiovascular Disease:  Yes [  ] No [x  ]  Coronary Artery Disease in first degree relative: Yes [  ] No [ x ]  Sudden Cardiac Death in First degree relative: Yes [  ] No [x  ]    HOME MEDICATIONS:  amiodarone 200 mg oral tablet: 1 tab(s) orally once a day (02 Jul 2022 16:21)  aspirin 81 mg oral delayed release tablet: 1 tab(s) orally once a day (02 Jul 2022 16:21)  atorvastatin 20 mg oral tablet: 1 tab(s) orally once a day (at bedtime) (13 Jul 2022 08:41)  Basaglar KwikPen 100 units/mL subcutaneous solution: 17 to 20 unit(s) subcutaneous 2 times a day (02 Jul 2022 16:21)  bisacodyl 10 mg rectal suppository: 1 suppository(ies) rectal once a day, As needed, If no bowel movement (13 Jul 2022 08:41)  Crestor 5 mg oral tablet: 1 tab(s) orally every 48 hours (02 Jul 2022 16:21)  desvenlafaxine (as base) 100 mg oral tablet, extended release: 1 tab(s) orally once a day (02 Jul 2022 16:21)  enoxaparin: 40 milligram(s) subcutaneous once a day (13 Jul 2022 08:41)  ferrous sulfate 325 mg (65 mg elemental iron) oral tablet: 1 tab(s) orally 3 times a day (02 Jul 2022 16:21)  folic acid 1 mg oral tablet: 1 tab(s) orally once a day (02 Jul 2022 16:21)  levothyroxine 75 mcg (0.075 mg) oral tablet: 1 tab(s) orally once a day (15 Jul 2022 09:20)  magnesium hydroxide 8% oral suspension: 30 milliliter(s) orally once a day, As needed, Constipation (13 Jul 2022 08:41)  midodrine 5 mg oral tablet: 3 tab(s) orally every 8 hours (13 Jul 2022 08:41)  NovoLOG 100 units/mL subcutaneous solution: 7 to 8 unit(s) subcutaneous 3 times a day (with meals) (02 Jul 2022 16:21)  Vitamin B12 1000 mcg oral tablet: 1 tab(s) orally once a day (02 Jul 2022 16:21)  Vitamin D3 125 mcg (5000 intl units) oral capsule: 1 cap(s) orally once a day (02 Jul 2022 16:21)      CURRENT CARDIAC MEDICATIONS:  aMIOdarone    Tablet 100 milliGRAM(s) Oral daily  midodrine. 15 milliGRAM(s) Oral every 8 hours      CURRENT OTHER MEDICATIONS:  ondansetron Injectable 4 milliGRAM(s) IV Push every 6 hours PRN Nausea and/or Vomiting  oxyCODONE    IR 10 milliGRAM(s) Oral every 3 hours PRN Moderate Pain (4 - 6)  bisacodyl Suppository 10 milliGRAM(s) Rectal daily PRN If no bowel movement  magnesium hydroxide Suspension 30 milliLiter(s) Oral daily PRN Constipation  AQUAPHOR (petrolatum Ointment) 1 Application(s) Topical two times a day  aspirin enteric coated 81 milliGRAM(s) Oral daily  atorvastatin 20 milliGRAM(s) Oral at bedtime  cyanocobalamin 1000 MICROGram(s) Oral daily  dextrose 5%. 1000 milliLiter(s) (50 mL/Hr) IV Continuous <Continuous>  dextrose 5%. 1000 milliLiter(s) (100 mL/Hr) IV Continuous <Continuous>  dextrose 50% Injectable 25 Gram(s) IV Push once, Stop order after: 1 Doses  dextrose 50% Injectable 25 Gram(s) IV Push once, Stop order after: 1 Doses  dextrose 50% Injectable 12.5 Gram(s) IV Push once, Stop order after: 1 Doses  dextrose Oral Gel 15 Gram(s) Oral once, Stop order after: 1 Doses PRN Blood Glucose LESS THAN 70 milliGRAM(s)/deciliter  enoxaparin Injectable 40 milliGRAM(s) SubCutaneous every 24 hours  ferrous    sulfate 325 milliGRAM(s) Oral two times a day  ferrous    sulfate 325 milliGRAM(s) Oral two times a day  folic acid 1 milliGRAM(s) Oral daily  glucagon  Injectable 1 milliGRAM(s) IntraMuscular once, Stop order after: 1 Doses  insulin glargine Injectable (LANTUS) 15 Unit(s) SubCutaneous two times a day  insulin lispro (ADMELOG) corrective regimen sliding scale   SubCutaneous Before meals and at bedtime  iron sucrose Injectable 100 milliGRAM(s) IV Push every 24 hours, Stop order after: 2 Days  levothyroxine 75 MICROGram(s) Oral daily      ALLERGIES:   Allergy Status Unknown      REVIEW OF SYMPTOMS:   CONSTITUTIONAL: No fever, no chills, no weight loss, no weight gain, no fatigue   ENMT:  No vertigo; No sinus or throat pain  NECK: No pain or stiffness  CARDIOVASCULAR: No chest pain, no dyspnea, no syncope/presyncope, no palpitations, no dizziness, no Orthopnea, no Paroxsymal nocturnal dyspnea  RESPIRATORY: no Shortness of breath, no cough, no wheezing  : No dysuria, no hematuria   GI: No dark color stool, no nausea, no diarrhea, no constipation, no abdominal pain   NEURO: No headache, no slurred speech   MUSCULOSKELETAL: No joint pain or swelling; No muscle, back, or extremity pain  PSYCH: No agitation, no anxiety.    ALL OTHER REVIEW OF SYSTEMS ARE NEGATIVE.    VITAL SIGNS:  T(C): 36.6 (07-15-22 @ 08:03), Max: 36.7 (07-14-22 @ 16:21)  T(F): 97.9 (07-15-22 @ 08:03), Max: 98 (07-14-22 @ 16:21)  HR: 80 (07-15-22 @ 09:22) (66 - 85)  BP: 140/62 (07-15-22 @ 09:22) (92/65 - 181/79)  RR: 18 (07-15-22 @ 08:03) (18 - 19)  SpO2: 98% (07-15-22 @ 08:03) (98% - 100%)    INTAKE AND OUTPUT:     07-14 @ 07:01  -  07-15 @ 07:00  --------------------------------------------------------  IN: 0 mL / OUT: 2301 mL / NET: -2301 mL        PHYSICAL EXAM:  Constitutional: Comfortable . No acute distress.   HEENT: Atraumatic and normocephalic , neck is supple . no JVD. No carotid bruit.  CNS: A&Ox3. No focal deficits.   Respiratory: CTAB, unlabored   Cardiovascular: RRR normal s1 s2. No murmur. No rubs or gallop.  Gastrointestinal: Soft, non-tender. +Bowel sounds.   Extremities: 2+ Peripheral Pulses, No clubbing, cyanosis, or edema  Psychiatric: Calm . no agitation.   Skin: Warm and dry, no ulcers on extremities     LABS:                            8.7    8.36  )-----------( 292      ( 15 Jul 2022 07:11 )             26.5     07-15    134<L>  |  99  |  17.1  ----------------------------<  211<H>  4.3   |  24.0  |  1.41<H>    Ca    8.3<L>      15 Jul 2022 07:11  Phos  3.2     07-14  Mg     1.6     07-14    TPro  5.6<L>  /  Alb  2.7<L>  /  TBili  0.8  /  DBili  x   /  AST  16  /  ALT  11  /  AlkPhos  233<H>  07-15            Thyroid Stimulating Hormone, Serum: 6.61 uIU/mL (07-15-22 @ 07:11)      INTERPRETATION OF TELEMETRY:     ECG: NSR  Prior ECG: Yes [  ] No [  ]    RADIOLOGY & ADDITIONAL STUDIES:    X-ray:    CT scan:     MRI: report obtained from [employer]  MRI Brain Findings: EXAM: MR ANGIO NECK   EXAM: MR BRAIN     PROCEDURE DATE: 03/13/2021   Clinical indication: Syncope.    MRI of the brain was performed using sagittal T1, axial T1 and fast spin-echo T2-weighted sequence of FLAIR diffusion and susceptibility weighted sequence.    MRA the neck was formed using 2-D and 3-D time flight technique.    This exam is compared with prior brain MRI performed on January 18, 2021    Parenchymal volume loss and chronic microvascular ischemic changes are identified. There is extra-axial fluid identified in the right middle cranial fossa. This is seen medially and is unchanged when compared with the prior exam. This is compatible with an arachnoid cyst and measures approximately 3.9 x 2.8 cm.    The large vessels demonstrate normal flow voids    Bilateral maxillary sinus mucosal thickening is seen.    Mild inflammatory change involving the left mastoid.    Both distal common carotid, proximal internal and external carotid arteries appear normal as well as both vertebral arteries. No significant stenosis is seen.    IMPRESSION: Arachnoid cyst as described above.    Unremarkable MRA of the neck.    US:

## 2022-07-15 NOTE — CONSULT NOTE ADULT - PROBLEM SELECTOR RECOMMENDATION 9
.  - patient with 2.5 year hx of syncopal episodes which mostly occur when sitting to standing  - saw neurologist in Dec 2020 for evaluation of symptoms  - MRI head 3/2021 with arachnoid cyst 3.9 x 2.8 cm.  -  EEG in Dec 2020 unrevealing  - also with hx of difficuly sleeping, sleep study with DAYANARA  - per previous neurologist note, cannot rule out diabetic autonomic polyneuropathy  - awaiting EP reports from primary cardiologist office .  - patient with 2.5 year hx of syncopal episodes which mostly occur when sitting to standing  - saw neurologist in Dec 2020 for evaluation of symptoms  - MRI head 3/2021 with arachnoid cyst 3.9 x 2.8 cm.  -  EEG in Dec 2020 unrevealing  - also with hx of difficuly sleeping, sleep study with DAYANARA  - per previous neurologist note, cannot rule out diabetic autonomic polyneuropathy  - awaiting EP reports from primary cardiologist office  - TTE from this visit with rheumatic mitral valve, not previous noted on OP TTE on 8/9/2021 and also with mobile structure noted on the anterior mitral leaflet likely   represents loose calcium. .  - patient with 2.5 year hx of syncopal episodes which mostly occur when sitting to standing  - saw neurologist in Dec 2020 for evaluation of symptoms  - MRI head 3/2021 with arachnoid cyst 3.9 x 2.8 cm.  -  EEG in Dec 2020 unrevealing  - from primary cardiologist last visit on 6/7/2022 syncopal episodes likely multifactorial due to uncontrolled DM, low vitamin b12 and alcohol use (social use now). At that time he was on midodrine but due to urinary frequency and retention and was replaced with fludrocortisone. He is now only on midodrine.  - other differentials include POTS and diabetic autonomic dysfunction  - TTE from this visit with rheumatic mitral valve, not previous noted on OP TTE on 8/9/2021 and also with mobile structure noted on the anterior mitral leaflet likely represents loose calcium. Cardiologist reviewed images, unlikely to be rheumatic mitral valve.   -  continue Midodrine, encourage PO hydration and salt intake and compression stockings  - encouraged patient to take time rising from sitting/laying positions to avoid syncopal events .  - patient with 2.5 year hx of syncopal episodes which mostly occur when sitting to standing  - saw neurologist in Dec 2020 for evaluation of symptoms  - MRI head 3/2021 with arachnoid cyst 3.9 x 2.8 cm.  -  EEG in Dec 2020 unrevealing  - from primary cardiologist last visit on 6/7/2022 syncopal episodes likely multifactorial due to uncontrolled DM, low vitamin b12 and alcohol use (social use now). At that time he was on midodrine but due to urinary frequency and retention and was replaced with fludrocortisone. He is now only on midodrine.  - other differentials include POTS and diabetic autonomic dysfunction  - TTE from this visit with rheumatic mitral valve, not previous noted on OP TTE on 8/9/2021 and also with mobile structure noted on the anterior mitral leaflet likely represents loose calcium. Cardiologist reviewed images, unlikely to be rheumatic mitral valve.   - OP carotid U/S   -  continue Midodrine, encourage PO hydration and salt intake and compression stockings  - encouraged patient to take time rising from sitting/laying positions to avoid syncopal events .  - patient with 2.5 year hx of syncopal episodes which mostly occur when sitting to standing  - saw neurologist in Dec 2020 for evaluation of symptoms  - MRI head 3/2021 with arachnoid cyst 3.9 x 2.8 cm.  -  EEG in Dec 2020 unrevealing  - from primary cardiologist last visit on 6/7/2022 syncopal episodes likely multifactorial due to uncontrolled DM, low vitamin b12 and alcohol use (social use now). At that time he was on midodrine but due to urinary frequency and retention and was replaced with fludrocortisone. He is now only on midodrine.  - other differentials include POTS and diabetic autonomic dysfunction  - TTE from this visit with rheumatic mitral valve, not previous noted on OP TTE on 8/9/2021 and also with mobile structure noted on the anterior mitral leaflet likely represents loose calcium. Cardiologist reviewed images, unlikely to be rheumatic mitral valve.   - OP carotid U/S unrevealing  -  continue Midodrine, encourage PO hydration and salt intake and compression stockings  - encouraged patient to take time rising from sitting/laying positions to avoid syncopal events .  - /80 sitting, -> 92/65 standing  - patient with 2.5 year hx of syncopal episodes which mostly occur when sitting to standing  - saw neurologist in Dec 2020 for evaluation of symptoms  - MRI head 3/2021 with arachnoid cyst 3.9 x 2.8 cm.  -  EEG in Dec 2020 unrevealing  - from primary cardiologist last visit on 6/7/2022 syncopal episodes likely multifactorial due to uncontrolled DM, low vitamin b12 and alcohol use (social use now). At that time he was on midodrine but due to urinary frequency and retention and was replaced with fludrocortisone. He is now only on midodrine.  - other differentials include POTS and diabetic autonomic dysfunction  - TTE from this visit with rheumatic mitral valve, not previous noted on OP TTE on 8/9/2021 and also with mobile structure noted on the anterior mitral leaflet likely represents loose calcium. Cardiologist reviewed images, unlikely to be rheumatic mitral valve.   - OP carotid U/S mild-mod plaque no stenosis (2/2021)  -  continue Midodrine, encourage PO hydration and salt intake and compression stockings  - encouraged patient to take time rising from sitting/laying positions to avoid syncopal events

## 2022-07-15 NOTE — PROGRESS NOTE ADULT - SUBJECTIVE AND OBJECTIVE BOX
Patient seen and examined at bedside.  Able to transfer sit-to-stand w/ CG.      REVIEW OF SYSTEMS  Constitutional - No fever,  +fatigue  Neurological - No headaches, +loss of strength   Musculoskeletal - +muscle pain    FUNCTIONAL PROGRESS  7/12 PT  Bed Mobility  Bed Mobility Training Rehab Potential: good, to achieve stated therapy goals  Bed Mobility Training Symptoms Noted During/After Treatment: +orthostatic BP, no c/o symptoms no signs distress  Bed Mobility Training Sit-to-Supine: minimum assist (75% patient effort);  1 person assist;  bed rails  Bed Mobility Training Supine-to-Sit: minimum assist (75% patient effort);  1 person assist;  bed rails  Bed Mobility Training Limitations: decreased ability to use legs for bridging/pushing;  Decreased ROM, Decreased strength, Pain     Sit-Stand Transfer Training  Sit-to-Stand Transfer Training Symptoms Noted During/After Treatment: +orthostatic BP, no c/o dizziness  Transfer Training Sit-to-Stand Transfer: minimum assist (75% patient effort);  1 person assist;  weight-bearing as tolerated   Left LE    rolling walker  Transfer Training Stand-to-Sit Transfer: minimum assist (75% patient effort);  1 person assist;  weight-bearing as tolerated   rolling walker;  Left LE   Sit-to-Stand Transfer Training Transfer Safety Analysis: decreased balance;  Decreased ROM, Decreased strength, Pain     Gait Training  Gait Training Symptoms Noted During/After Treatment: pt with improved BP (102/68) with no c/o dizziness/ lightheaded. Post gait training pt reports slight dizziness and orthostatic BP (84/54) improved symptoms with prolonged sitting.   Gait Training: minimum assist (75% patient effort);  1 person assist;  weight-bearing as tolerated   Left LE    rolling walker;  10 feet;  stand by of another person due to orthostatic BP changes   Gait Analysis: 3-point gait   decreased catalina;  decreased step length;  decreased stride length;  Decreased ROM, Decreased strength, Pain       VITALS   Vital Signs Last 24 Hrs  T(C): 36.6 (15 Jul 2022 08:03), Max: 36.7 (14 Jul 2022 16:21)  T(F): 97.9 (15 Jul 2022 08:03), Max: 98 (14 Jul 2022 16:21)  HR: 80 (15 Jul 2022 09:22) (66 - 85)  BP: 140/62 (15 Jul 2022 09:22) (92/65 - 181/79)  BP(mean): --  RR: 18 (15 Jul 2022 08:03) (18 - 19)  SpO2: 98% (15 Jul 2022 08:03) (98% - 100%)    Parameters below as of 15 Jul 2022 08:03  Patient On (Oxygen Delivery Method): room air        MEDICATIONS   MEDICATIONS  (STANDING):  aMIOdarone    Tablet 100 milliGRAM(s) Oral daily  AQUAPHOR (petrolatum Ointment) 1 Application(s) Topical two times a day  aspirin enteric coated 81 milliGRAM(s) Oral daily  atorvastatin 20 milliGRAM(s) Oral at bedtime  cyanocobalamin 1000 MICROGram(s) Oral daily  dextrose 5%. 1000 milliLiter(s) (50 mL/Hr) IV Continuous <Continuous>  dextrose 5%. 1000 milliLiter(s) (100 mL/Hr) IV Continuous <Continuous>  dextrose 50% Injectable 25 Gram(s) IV Push once  dextrose 50% Injectable 12.5 Gram(s) IV Push once  dextrose 50% Injectable 25 Gram(s) IV Push once  enoxaparin Injectable 40 milliGRAM(s) SubCutaneous every 24 hours  ferrous    sulfate 325 milliGRAM(s) Oral two times a day  ferrous    sulfate 325 milliGRAM(s) Oral two times a day  folic acid 1 milliGRAM(s) Oral daily  glucagon  Injectable 1 milliGRAM(s) IntraMuscular once  insulin glargine Injectable (LANTUS) 15 Unit(s) SubCutaneous two times a day  insulin lispro (ADMELOG) corrective regimen sliding scale   SubCutaneous Before meals and at bedtime  iron sucrose Injectable 100 milliGRAM(s) IV Push every 24 hours  levothyroxine 75 MICROGram(s) Oral daily  midodrine. 15 milliGRAM(s) Oral every 8 hours    MEDICATIONS  (PRN):  bisacodyl Suppository 10 milliGRAM(s) Rectal daily PRN If no bowel movement  dextrose Oral Gel 15 Gram(s) Oral once PRN Blood Glucose LESS THAN 70 milliGRAM(s)/deciliter  magnesium hydroxide Suspension 30 milliLiter(s) Oral daily PRN Constipation  ondansetron Injectable 4 milliGRAM(s) IV Push every 6 hours PRN Nausea and/or Vomiting  oxyCODONE    IR 10 milliGRAM(s) Oral every 3 hours PRN Moderate Pain (4 - 6)        RECENT LABS/IMAGING                          8.7    8.36  )-----------( 292      ( 15 Jul 2022 07:11 )             26.5       07-15    134<L>  |  99  |  17.1  ----------------------------<  211<H>  4.3   |  24.0  |  1.41<H>    Ca    8.3<L>      15 Jul 2022 07:11  Phos  3.2     07-14  Mg     1.6     07-14    TPro  5.6<L>  /  Alb  2.7<L>  /  TBili  0.8  /  DBili  x   /  AST  16  /  ALT  11  /  AlkPhos  233<H>  07-15      XR LEFT HIP 7/2 - There is a proximal short intramedullary keith in the left femur. The distal fixation screws fractured. There is sliding screw transfixes a intratrochanteric region fracture there is callus formation. There is a new proximal femoral fracture in the region of the intramedullary keith.    ----------------------------------------------------------------------------------------  PHYSICAL EXAM  Constitutional - NAD, Comfortable  Extremities - No edema   Neurologic Exam -                    Cognitive - AAOx4     Motor - No focal deficits - related to pain/nausea     Sensory - BLE distal foot numbness  Psychiatric - Calm   ----------------------------------------------------------------------------------------  ASSESSMENT/PLAN  51yMale with functional deficits after a mechanical fall sustaining a periprosthetic fracture  Left  periprosthetic fracture s/p IMN - WBAT  DM - Lantus, Lispro  AFIB - Amiodarone  Orthostatic HypoTN - Midodrine,  TTE DONE  CAD - Lipitor, ASA  Pain - Tylenol, Oxycodone  DVT PPX - SCDs, Lovenox   Rehab - Pending medical optimization prior to AR.     Patient is walking functional distances w/ aide of RW.  Recommend AR for further multidisciplinary therapy.  Discussed with rehab team.    Patient seen and examined at bedside.  Able to transfer sit-to-stand w/ CG.      REVIEW OF SYSTEMS  Constitutional - No fever,  +fatigue  Neurological - No headaches, +loss of strength   Musculoskeletal - +muscle pain    FUNCTIONAL PROGRESS  7/14 PT  Bed Mobility  Bed Mobility Training Rehab Potential: good, to achieve stated therapy goals  Bed Mobility Training Symptoms Noted During/After Treatment: +orthostatic BP, no c/o symptoms no signs distress  Bed Mobility Training Sit-to-Supine: minimum assist (75% patient effort);  1 person assist;  bed rails  Bed Mobility Training Supine-to-Sit: minimum assist (75% patient effort);  1 person assist;  bed rails  Bed Mobility Training Limitations: decreased ability to use legs for bridging/pushing;  Decreased ROM, Decreased strength, Pain     Sit-Stand Transfer Training  Sit-to-Stand Transfer Training Symptoms Noted During/After Treatment: +orthostatic BP, no c/o dizziness  Transfer Training Sit-to-Stand Transfer: minimum assist (75% patient effort);  1 person assist;  weight-bearing as tolerated   Left LE    rolling walker  Transfer Training Stand-to-Sit Transfer: minimum assist (75% patient effort);  1 person assist;  weight-bearing as tolerated   rolling walker;  Left LE   Sit-to-Stand Transfer Training Transfer Safety Analysis: decreased balance;  Decreased ROM, Decreased strength, Pain     Gait Training  Gait Training Symptoms Noted During/After Treatment: pt with improved BP (102/68) with no c/o dizziness/ lightheaded. Post gait training pt reports slight dizziness and orthostatic BP (84/54) improved symptoms with prolonged sitting.   Gait Training: minimum assist (75% patient effort);  1 person assist;  weight-bearing as tolerated   Left LE    rolling walker;  45' x2  stand by of another person due to orthostatic BP changes   Gait Analysis: 3-point gait   decreased catalina;  decreased step length;  decreased stride length;  Decreased ROM, Decreased strength, Pain       VITALS   Vital Signs Last 24 Hrs  T(C): 36.6 (15 Jul 2022 08:03), Max: 36.7 (14 Jul 2022 16:21)  T(F): 97.9 (15 Jul 2022 08:03), Max: 98 (14 Jul 2022 16:21)  HR: 80 (15 Jul 2022 09:22) (66 - 85)  BP: 140/62 (15 Jul 2022 09:22) (92/65 - 181/79)  BP(mean): --  RR: 18 (15 Jul 2022 08:03) (18 - 19)  SpO2: 98% (15 Jul 2022 08:03) (98% - 100%)    Parameters below as of 15 Jul 2022 08:03  Patient On (Oxygen Delivery Method): room air        MEDICATIONS   MEDICATIONS  (STANDING):  aMIOdarone    Tablet 100 milliGRAM(s) Oral daily  AQUAPHOR (petrolatum Ointment) 1 Application(s) Topical two times a day  aspirin enteric coated 81 milliGRAM(s) Oral daily  atorvastatin 20 milliGRAM(s) Oral at bedtime  cyanocobalamin 1000 MICROGram(s) Oral daily  dextrose 5%. 1000 milliLiter(s) (50 mL/Hr) IV Continuous <Continuous>  dextrose 5%. 1000 milliLiter(s) (100 mL/Hr) IV Continuous <Continuous>  dextrose 50% Injectable 25 Gram(s) IV Push once  dextrose 50% Injectable 12.5 Gram(s) IV Push once  dextrose 50% Injectable 25 Gram(s) IV Push once  enoxaparin Injectable 40 milliGRAM(s) SubCutaneous every 24 hours  ferrous    sulfate 325 milliGRAM(s) Oral two times a day  ferrous    sulfate 325 milliGRAM(s) Oral two times a day  folic acid 1 milliGRAM(s) Oral daily  glucagon  Injectable 1 milliGRAM(s) IntraMuscular once  insulin glargine Injectable (LANTUS) 15 Unit(s) SubCutaneous two times a day  insulin lispro (ADMELOG) corrective regimen sliding scale   SubCutaneous Before meals and at bedtime  iron sucrose Injectable 100 milliGRAM(s) IV Push every 24 hours  levothyroxine 75 MICROGram(s) Oral daily  midodrine. 15 milliGRAM(s) Oral every 8 hours    MEDICATIONS  (PRN):  bisacodyl Suppository 10 milliGRAM(s) Rectal daily PRN If no bowel movement  dextrose Oral Gel 15 Gram(s) Oral once PRN Blood Glucose LESS THAN 70 milliGRAM(s)/deciliter  magnesium hydroxide Suspension 30 milliLiter(s) Oral daily PRN Constipation  ondansetron Injectable 4 milliGRAM(s) IV Push every 6 hours PRN Nausea and/or Vomiting  oxyCODONE    IR 10 milliGRAM(s) Oral every 3 hours PRN Moderate Pain (4 - 6)        RECENT LABS/IMAGING                          8.7    8.36  )-----------( 292      ( 15 Jul 2022 07:11 )             26.5       07-15    134<L>  |  99  |  17.1  ----------------------------<  211<H>  4.3   |  24.0  |  1.41<H>    Ca    8.3<L>      15 Jul 2022 07:11  Phos  3.2     07-14  Mg     1.6     07-14    TPro  5.6<L>  /  Alb  2.7<L>  /  TBili  0.8  /  DBili  x   /  AST  16  /  ALT  11  /  AlkPhos  233<H>  07-15      XR LEFT HIP 7/2 - There is a proximal short intramedullary keith in the left femur. The distal fixation screws fractured. There is sliding screw transfixes a intratrochanteric region fracture there is callus formation. There is a new proximal femoral fracture in the region of the intramedullary keith.    ----------------------------------------------------------------------------------------  PHYSICAL EXAM  Constitutional - NAD, Comfortable  Extremities - No edema   Neurologic Exam -                    Cognitive - AAOx4     Motor - No focal deficits - related to pain/nausea     Sensory - BLE distal foot numbness  Psychiatric - Calm   ----------------------------------------------------------------------------------------  ASSESSMENT/PLAN  51yMale with functional deficits after a mechanical fall sustaining a periprosthetic fracture  Left  periprosthetic fracture s/p IMN - WBAT  DM - Lantus, Lispro  AFIB - Amiodarone  Orthostatic HypoTN - Midodrine,  TTE DONE  CAD - Lipitor, ASA  Pain - Tylenol, Oxycodone  DVT PPX - SCDs, Lovenox   Rehab - Pending medical optimization prior to AR.     Patient is walking functional distances 45' x2 w/ aide of RW.  Recommend AR for further multidisciplinary therapy.  Discussed with rehab team.

## 2022-07-15 NOTE — CONSULT NOTE ADULT - PROBLEM SELECTOR RECOMMENDATION 2
.  - s/p afib ablation and off Eliquis  - former with loop recorder, now removed  - no Afib on telemetry while inpatient  - remains on amiodarone   - patient may need loop recorder again for further monitoring      further reccs to follow .  - s/p afib ablation (9/22/2017) and off Eliquis since June 2022 due to syncope and risk for bleeds  - formerly with loop recorder, now removed  - no Afib on telemetry while inpatient  - remains on amiodarone   - patient may need loop recorder again for further monitoring  - per primary cardiologist if patient with continues Afib will consider LOLA closure device      No further inpatient cardiac work up at this time.  Will sign off.  Please reconsult if needed. .  - s/p afib ablation (9/22/2017) and off Eliquis since June 2022 due to syncope and risk for bleeds  - formerly with loop recorder, now removed  - no Afib on telemetry while inpatient  - remains on amiodarone   - patient may need loop recorder again for further monitoring  - per primary cardiologist if patient with continues Afib will consider LOLA closure device        Will sign off.  Please reconsult if needed. .  - s/p afib ablation (9/22/2017) and off Eliquis since June 2022 due to syncope and risk for bleeds  - formerly with loop recorder, now removed  - no Afib on telemetry while inpatient  - remains on amiodarone   - patient may need loop recorder again for further monitoring  - per primary cardiologist if patient with continues Afib will consider LOLA closure device      No further inpatient cardiac work up at this time.  Patient should f/u with primary cardiologist as OP  Will sign off.  Please reconsult if needed.        Will sign off.  Please reconsult if needed. .  - s/p afib ablation (9/22/2017) and off Eliquis since June 2022 due to syncope and risk for bleeds  - formerly with loop recorder, now removed  - no Afib on telemetry while inpatient  - remains on amiodarone   - patient may need loop recorder again for further monitoring  - per primary cardiologist if patient with continues Afib will consider LOLA closure device        No further inpatient cardiac work up at this time.  Patient should f/u with primary cardiologist as OP  Will sign off.  Please reconsult if needed.

## 2022-07-16 LAB
ANION GAP SERPL CALC-SCNC: 9 MMOL/L — SIGNIFICANT CHANGE UP (ref 5–17)
BUN SERPL-MCNC: 13.9 MG/DL — SIGNIFICANT CHANGE UP (ref 8–20)
CALCIUM SERPL-MCNC: 8.6 MG/DL — SIGNIFICANT CHANGE UP (ref 8.6–10.2)
CHLORIDE SERPL-SCNC: 100 MMOL/L — SIGNIFICANT CHANGE UP (ref 98–107)
CO2 SERPL-SCNC: 27 MMOL/L — SIGNIFICANT CHANGE UP (ref 22–29)
CREAT SERPL-MCNC: 1.26 MG/DL — SIGNIFICANT CHANGE UP (ref 0.5–1.3)
EGFR: 68 ML/MIN/1.73M2 — SIGNIFICANT CHANGE UP
FOLATE RBC-MCNC: 2134 NG/ML — HIGH (ref 499–1504)
GLUCOSE BLDC GLUCOMTR-MCNC: 212 MG/DL — HIGH (ref 70–99)
GLUCOSE BLDC GLUCOMTR-MCNC: 254 MG/DL — HIGH (ref 70–99)
GLUCOSE BLDC GLUCOMTR-MCNC: 258 MG/DL — HIGH (ref 70–99)
GLUCOSE BLDC GLUCOMTR-MCNC: 309 MG/DL — HIGH (ref 70–99)
GLUCOSE SERPL-MCNC: 203 MG/DL — HIGH (ref 70–99)
HCT VFR BLD CALC: 28.4 % — LOW (ref 39–50)
HGB BLD-MCNC: 9 G/DL — LOW (ref 13–17)
MAGNESIUM SERPL-MCNC: 1.6 MG/DL — SIGNIFICANT CHANGE UP (ref 1.6–2.6)
MCHC RBC-ENTMCNC: 30 PG — SIGNIFICANT CHANGE UP (ref 27–34)
MCHC RBC-ENTMCNC: 31.7 GM/DL — LOW (ref 32–36)
MCV RBC AUTO: 94.7 FL — SIGNIFICANT CHANGE UP (ref 80–100)
PLATELET # BLD AUTO: 299 K/UL — SIGNIFICANT CHANGE UP (ref 150–400)
POTASSIUM SERPL-MCNC: 4.5 MMOL/L — SIGNIFICANT CHANGE UP (ref 3.5–5.3)
POTASSIUM SERPL-SCNC: 4.5 MMOL/L — SIGNIFICANT CHANGE UP (ref 3.5–5.3)
RBC # BLD: 3 M/UL — LOW (ref 4.2–5.8)
RBC # FLD: 13.4 % — SIGNIFICANT CHANGE UP (ref 10.3–14.5)
SODIUM SERPL-SCNC: 136 MMOL/L — SIGNIFICANT CHANGE UP (ref 135–145)
WBC # BLD: 9.92 K/UL — SIGNIFICANT CHANGE UP (ref 3.8–10.5)
WBC # FLD AUTO: 9.92 K/UL — SIGNIFICANT CHANGE UP (ref 3.8–10.5)

## 2022-07-16 PROCEDURE — 99232 SBSQ HOSP IP/OBS MODERATE 35: CPT

## 2022-07-16 RX ADMIN — OXYCODONE HYDROCHLORIDE 10 MILLIGRAM(S): 5 TABLET ORAL at 20:38

## 2022-07-16 RX ADMIN — MIDODRINE HYDROCHLORIDE 15 MILLIGRAM(S): 2.5 TABLET ORAL at 13:08

## 2022-07-16 RX ADMIN — IRON SUCROSE 210 MILLIGRAM(S): 20 INJECTION, SOLUTION INTRAVENOUS at 17:27

## 2022-07-16 RX ADMIN — ENOXAPARIN SODIUM 40 MILLIGRAM(S): 100 INJECTION SUBCUTANEOUS at 17:27

## 2022-07-16 RX ADMIN — Medication 1 MILLIGRAM(S): at 11:11

## 2022-07-16 RX ADMIN — Medication 6: at 21:10

## 2022-07-16 RX ADMIN — Medication 4: at 07:38

## 2022-07-16 RX ADMIN — MIDODRINE HYDROCHLORIDE 15 MILLIGRAM(S): 2.5 TABLET ORAL at 20:38

## 2022-07-16 RX ADMIN — Medication 6: at 17:26

## 2022-07-16 RX ADMIN — Medication 1 APPLICATION(S): at 17:19

## 2022-07-16 RX ADMIN — INSULIN GLARGINE 15 UNIT(S): 100 INJECTION, SOLUTION SUBCUTANEOUS at 07:42

## 2022-07-16 RX ADMIN — Medication 325 MILLIGRAM(S): at 17:26

## 2022-07-16 RX ADMIN — AMIODARONE HYDROCHLORIDE 100 MILLIGRAM(S): 400 TABLET ORAL at 05:15

## 2022-07-16 RX ADMIN — Medication 75 MICROGRAM(S): at 05:15

## 2022-07-16 RX ADMIN — OXYCODONE HYDROCHLORIDE 10 MILLIGRAM(S): 5 TABLET ORAL at 21:35

## 2022-07-16 RX ADMIN — Medication 325 MILLIGRAM(S): at 05:15

## 2022-07-16 RX ADMIN — MIDODRINE HYDROCHLORIDE 15 MILLIGRAM(S): 2.5 TABLET ORAL at 05:15

## 2022-07-16 RX ADMIN — INSULIN GLARGINE 15 UNIT(S): 100 INJECTION, SOLUTION SUBCUTANEOUS at 21:10

## 2022-07-16 RX ADMIN — Medication 1 APPLICATION(S): at 05:16

## 2022-07-16 RX ADMIN — PREGABALIN 1000 MICROGRAM(S): 225 CAPSULE ORAL at 11:11

## 2022-07-16 RX ADMIN — ATORVASTATIN CALCIUM 20 MILLIGRAM(S): 80 TABLET, FILM COATED ORAL at 20:38

## 2022-07-16 RX ADMIN — Medication 81 MILLIGRAM(S): at 11:11

## 2022-07-16 RX ADMIN — Medication 8: at 11:11

## 2022-07-16 NOTE — PROGRESS NOTE ADULT - ASSESSMENT
Pt is 52yo M w history of diabetes,  A. fib, depression, hypotension, HLD, hypothyroid initially presented to [facility] [place] with complaints of pain in his left hip. marlyn had a hip fracture of his left hip in October 2021 and was repaired at [facility] [place].  s/p left hip ORIF       >Left femur fracture s/p ORIF   -Pain control  -WBAT LLE   -Prevena vac removed 7/10   -pending acute rehab      >Chronic Orthostatic hypotension   -Multifactorial,  Likely Autonomic dysfunction from uncontrolled DM , Dehydration, Hypovolemia, Symptomatic anemia   -c/w midodrine   -bilateral lower ext ACE wrap at all time when awake  -PT only in the pm   -Patient is asymptomatic   -s/p echo : Cardiology Consulted , Mobile structure noted on the anterior mitral leaflet likely , represents loose calcium. no acute interventions     >Anemia, postop acute blood loss  -monitor hemoglobin   -transfused 1 PRBC blood transfusion     >DM - uncontrolled   -ISS, monitor BS   -A1C -9  -adjust basal, ssi for adequate fsg control     >Hyponatremia  -Corrected sodium is 132->133->134  -Monitor BMP    >RAVEN on CKD3  -Monitor Renal function  -avoid nephrotoxics.   -monitor intake and output and Creatinine     >hx of Afib  -S/p ablation  -C/w amiodarone  -Not on AC    >Hypothyroid  -elevated TSH   -increased  Synthroid to 75mcg   - repeat tfts in 4-6 weeks as op     >HLD  -Atorvastatin    >DVT prophylaxis: Lovenox x 4 weeks, plan for  Acute rehab, pending placement

## 2022-07-16 NOTE — PROGRESS NOTE ADULT - SUBJECTIVE AND OBJECTIVE BOX
cc: femur fx     interval hx: patient seen and eval. comfortable. in no acute distress. pain adequately controlled.       Vital Signs Last 24 Hrs  T(C): 36.6 (16 Jul 2022 10:39), Max: 36.7 (15 Jul 2022 16:29)  T(F): 97.9 (16 Jul 2022 10:39), Max: 98.1 (15 Jul 2022 16:29)  HR: 75 (16 Jul 2022 13:05) (64 - 75)  BP: 94/60 (16 Jul 2022 13:05) (94/60 - 153/81)  BP(mean): --  RR: 16 (16 Jul 2022 10:39) (16 - 19)  SpO2: 98% (16 Jul 2022 10:39) (97% - 98%)    Parameters below as of 16 Jul 2022 10:39  Patient On (Oxygen Delivery Method): room air        Constitutional: NAD, awake    HEENT: PERR, EOMI,   Neck: Soft and supple,    Respiratory: Breath sounds are clear bilaterally,   Cardiovascular: S1 and S2,    Gastrointestinal: Bowel Sounds present, soft, nontender,    Extremities: left lower ext +2   Left legs wound: clean   Vascular: 2+ peripheral pulses  Neurological: A/O x 3,                                                           9.0    9.92  )-----------( 299      ( 16 Jul 2022 07:03 )             28.4   07-16    136  |  100  |  13.9  ----------------------------<  203<H>  4.5   |  27.0  |  1.26    Ca    8.6      16 Jul 2022 07:03  Mg     1.6     07-16    TPro  5.6<L>  /  Alb  2.7<L>  /  TBili  0.8  /  DBili  x   /  AST  16  /  ALT  11  /  AlkPhos  233<H>  07-15      MEDICATIONS  (STANDING):  aMIOdarone    Tablet 100 milliGRAM(s) Oral daily  AQUAPHOR (petrolatum Ointment) 1 Application(s) Topical two times a day  aspirin enteric coated 81 milliGRAM(s) Oral daily  atorvastatin 20 milliGRAM(s) Oral at bedtime  cyanocobalamin 1000 MICROGram(s) Oral daily  dextrose 5%. 1000 milliLiter(s) (50 mL/Hr) IV Continuous <Continuous>  dextrose 5%. 1000 milliLiter(s) (100 mL/Hr) IV Continuous <Continuous>  dextrose 50% Injectable 25 Gram(s) IV Push once  dextrose 50% Injectable 25 Gram(s) IV Push once  dextrose 50% Injectable 12.5 Gram(s) IV Push once  enoxaparin Injectable 40 milliGRAM(s) SubCutaneous every 24 hours  ferrous    sulfate 325 milliGRAM(s) Oral two times a day  folic acid 1 milliGRAM(s) Oral daily  glucagon  Injectable 1 milliGRAM(s) IntraMuscular once  insulin glargine Injectable (LANTUS) 15 Unit(s) SubCutaneous two times a day  insulin lispro (ADMELOG) corrective regimen sliding scale   SubCutaneous Before meals and at bedtime  iron sucrose IVPB 100 milliGRAM(s) IV Intermittent every 24 hours  levothyroxine 75 MICROGram(s) Oral daily  midodrine. 15 milliGRAM(s) Oral every 8 hours    MEDICATIONS  (PRN):  bisacodyl Suppository 10 milliGRAM(s) Rectal daily PRN If no bowel movement  dextrose Oral Gel 15 Gram(s) Oral once PRN Blood Glucose LESS THAN 70 milliGRAM(s)/deciliter  magnesium hydroxide Suspension 30 milliLiter(s) Oral daily PRN Constipation  ondansetron Injectable 4 milliGRAM(s) IV Push every 6 hours PRN Nausea and/or Vomiting  oxyCODONE    IR 10 milliGRAM(s) Oral every 3 hours PRN Moderate Pain (4 - 6)

## 2022-07-17 LAB
GLUCOSE BLDC GLUCOMTR-MCNC: 168 MG/DL — HIGH (ref 70–99)
GLUCOSE BLDC GLUCOMTR-MCNC: 190 MG/DL — HIGH (ref 70–99)
GLUCOSE BLDC GLUCOMTR-MCNC: 207 MG/DL — HIGH (ref 70–99)
GLUCOSE BLDC GLUCOMTR-MCNC: 312 MG/DL — HIGH (ref 70–99)

## 2022-07-17 PROCEDURE — 99232 SBSQ HOSP IP/OBS MODERATE 35: CPT

## 2022-07-17 RX ADMIN — Medication 81 MILLIGRAM(S): at 12:25

## 2022-07-17 RX ADMIN — INSULIN GLARGINE 15 UNIT(S): 100 INJECTION, SOLUTION SUBCUTANEOUS at 22:08

## 2022-07-17 RX ADMIN — AMIODARONE HYDROCHLORIDE 100 MILLIGRAM(S): 400 TABLET ORAL at 06:09

## 2022-07-17 RX ADMIN — Medication 4: at 08:47

## 2022-07-17 RX ADMIN — PREGABALIN 1000 MICROGRAM(S): 225 CAPSULE ORAL at 12:26

## 2022-07-17 RX ADMIN — Medication 2: at 22:09

## 2022-07-17 RX ADMIN — Medication 325 MILLIGRAM(S): at 06:09

## 2022-07-17 RX ADMIN — Medication 1 APPLICATION(S): at 17:07

## 2022-07-17 RX ADMIN — INSULIN GLARGINE 15 UNIT(S): 100 INJECTION, SOLUTION SUBCUTANEOUS at 08:47

## 2022-07-17 RX ADMIN — IRON SUCROSE 210 MILLIGRAM(S): 20 INJECTION, SOLUTION INTRAVENOUS at 17:19

## 2022-07-17 RX ADMIN — ATORVASTATIN CALCIUM 20 MILLIGRAM(S): 80 TABLET, FILM COATED ORAL at 22:06

## 2022-07-17 RX ADMIN — ENOXAPARIN SODIUM 40 MILLIGRAM(S): 100 INJECTION SUBCUTANEOUS at 17:06

## 2022-07-17 RX ADMIN — Medication 8: at 17:06

## 2022-07-17 RX ADMIN — Medication 2: at 12:34

## 2022-07-17 RX ADMIN — Medication 1 APPLICATION(S): at 06:08

## 2022-07-17 RX ADMIN — Medication 75 MICROGRAM(S): at 06:09

## 2022-07-17 RX ADMIN — Medication 1 MILLIGRAM(S): at 12:26

## 2022-07-17 RX ADMIN — Medication 325 MILLIGRAM(S): at 17:06

## 2022-07-17 RX ADMIN — MIDODRINE HYDROCHLORIDE 15 MILLIGRAM(S): 2.5 TABLET ORAL at 06:08

## 2022-07-17 RX ADMIN — MIDODRINE HYDROCHLORIDE 15 MILLIGRAM(S): 2.5 TABLET ORAL at 12:27

## 2022-07-17 RX ADMIN — MIDODRINE HYDROCHLORIDE 15 MILLIGRAM(S): 2.5 TABLET ORAL at 22:06

## 2022-07-17 NOTE — PROGRESS NOTE ADULT - ASSESSMENT
Pt is 52yo M w history of diabetes,  A. fib, depression, hypotension, HLD, hypothyroid initially presented to [facility] [place] with complaints of pain in his left hip. marlyn had a hip fracture of his left hip in October 2021 and was repaired at [facility] [place].  s/p left hip ORIF       >Left femur fracture s/p ORIF   -Pain control  -WBAT LLE   -Prevena vac removed 7/10   -pending acute rehab      >Chronic Orthostatic hypotension   -Multifactorial,  Likely Autonomic dysfunction from uncontrolled DM , Dehydration, Hypovolemia, Symptomatic anemia   -c/w midodrine   -bilateral lower ext ACE wrap at all time when awake  -PT only in the pm   -Patient is asymptomatic   -s/p echo : Cardiology Consulted , Mobile structure noted on the anterior mitral leaflet likely , represents loose calcium. no acute interventions     >Anemia, postop acute blood loss  -monitor hemoglobin   -transfused 1 PRBC blood transfusion     >DM - uncontrolled   -ISS, monitor BS   -A1C -9  -adjust basal, ssi for adequate fsg control     >Hyponatremia  -Corrected sodium is 132->133->134  -Monitor BMP    >RAVEN on CKD3  -Monitor Renal function  -avoid nephrotoxics.   -monitor intake and output and Creatinine     >hx of Afib  -S/p ablation  -C/w amiodarone  -Not on AC    >Hypothyroid  -elevated TSH   -increased  Synthroid to 75mcg   - repeat tfts in 4-6 weeks as op     >HLD  -Atorvastatin    >DVT prophylaxis: Lovenox x 4 weeks, plan for  Acute rehab, pending placement . discussed with aleena/ rodney 7/17: no beds yet

## 2022-07-17 NOTE — PROGRESS NOTE ADULT - SUBJECTIVE AND OBJECTIVE BOX
cc: femur fx     interval hx: patient seen and eval. comfortable. in no acute distress. pain adequately controlled.       Vital Signs Last 24 Hrs  T(C): 36.7 (17 Jul 2022 10:39), Max: 36.8 (16 Jul 2022 19:26)  T(F): 98 (17 Jul 2022 10:39), Max: 98.3 (16 Jul 2022 19:26)  HR: 61 (17 Jul 2022 10:39) (61 - 67)  BP: 123/74 (17 Jul 2022 10:39) (120/71 - 127/76)  BP(mean): --  RR: 14 (17 Jul 2022 10:39) (14 - 18)  SpO2: 99% (17 Jul 2022 10:39) (98% - 99%)    Parameters below as of 17 Jul 2022 10:39  Patient On (Oxygen Delivery Method): room air    Constitutional: NAD, awake    HEENT: PERR, EOMI,   Neck: Soft and supple,    Respiratory: Breath sounds are clear bilaterally,   Cardiovascular: S1 and S2,    Gastrointestinal: Bowel Sounds present, soft, nontender,    Extremities: left lower ext +2   Left legs wound: clean   Vascular: 2+ peripheral pulses  Neurological: A/O x 3,  no focal motor or sensory deficits     MEDICATIONS  (STANDING):  aMIOdarone    Tablet 100 milliGRAM(s) Oral daily  AQUAPHOR (petrolatum Ointment) 1 Application(s) Topical two times a day  aspirin enteric coated 81 milliGRAM(s) Oral daily  atorvastatin 20 milliGRAM(s) Oral at bedtime  cyanocobalamin 1000 MICROGram(s) Oral daily  dextrose 5%. 1000 milliLiter(s) (100 mL/Hr) IV Continuous <Continuous>  dextrose 5%. 1000 milliLiter(s) (50 mL/Hr) IV Continuous <Continuous>  dextrose 50% Injectable 25 Gram(s) IV Push once  dextrose 50% Injectable 12.5 Gram(s) IV Push once  dextrose 50% Injectable 25 Gram(s) IV Push once  enoxaparin Injectable 40 milliGRAM(s) SubCutaneous every 24 hours  ferrous    sulfate 325 milliGRAM(s) Oral two times a day  folic acid 1 milliGRAM(s) Oral daily  glucagon  Injectable 1 milliGRAM(s) IntraMuscular once  insulin glargine Injectable (LANTUS) 15 Unit(s) SubCutaneous two times a day  insulin lispro (ADMELOG) corrective regimen sliding scale   SubCutaneous Before meals and at bedtime  levothyroxine 75 MICROGram(s) Oral daily  midodrine. 15 milliGRAM(s) Oral every 8 hours    MEDICATIONS  (PRN):  bisacodyl Suppository 10 milliGRAM(s) Rectal daily PRN If no bowel movement  dextrose Oral Gel 15 Gram(s) Oral once PRN Blood Glucose LESS THAN 70 milliGRAM(s)/deciliter  magnesium hydroxide Suspension 30 milliLiter(s) Oral daily PRN Constipation  ondansetron Injectable 4 milliGRAM(s) IV Push every 6 hours PRN Nausea and/or Vomiting  oxyCODONE    IR 10 milliGRAM(s) Oral every 3 hours PRN Moderate Pain (4 - 6)                            9.0    9.92  )-----------( 299      ( 16 Jul 2022 07:03 )             28.4   07-16    136  |  100  |  13.9  ----------------------------<  203<H>  4.5   |  27.0  |  1.26    Ca    8.6      16 Jul 2022 07:03  Mg     1.6     07-16

## 2022-07-18 ENCOUNTER — TRANSCRIPTION ENCOUNTER (OUTPATIENT)
Age: 52
End: 2022-07-18

## 2022-07-18 LAB
ANION GAP SERPL CALC-SCNC: 12 MMOL/L — SIGNIFICANT CHANGE UP (ref 5–17)
BUN SERPL-MCNC: 17.6 MG/DL — SIGNIFICANT CHANGE UP (ref 8–20)
CALCIUM SERPL-MCNC: 8.7 MG/DL — SIGNIFICANT CHANGE UP (ref 8.6–10.2)
CHLORIDE SERPL-SCNC: 102 MMOL/L — SIGNIFICANT CHANGE UP (ref 98–107)
CO2 SERPL-SCNC: 25 MMOL/L — SIGNIFICANT CHANGE UP (ref 22–29)
CREAT SERPL-MCNC: 1.55 MG/DL — HIGH (ref 0.5–1.3)
EGFR: 53 ML/MIN/1.73M2 — LOW
GLUCOSE BLDC GLUCOMTR-MCNC: 182 MG/DL — HIGH (ref 70–99)
GLUCOSE BLDC GLUCOMTR-MCNC: 243 MG/DL — HIGH (ref 70–99)
GLUCOSE BLDC GLUCOMTR-MCNC: 257 MG/DL — HIGH (ref 70–99)
GLUCOSE BLDC GLUCOMTR-MCNC: 282 MG/DL — HIGH (ref 70–99)
GLUCOSE SERPL-MCNC: 163 MG/DL — HIGH (ref 70–99)
HCT VFR BLD CALC: 28.9 % — LOW (ref 39–50)
HGB BLD-MCNC: 9.4 G/DL — LOW (ref 13–17)
MCHC RBC-ENTMCNC: 30.4 PG — SIGNIFICANT CHANGE UP (ref 27–34)
MCHC RBC-ENTMCNC: 32.5 GM/DL — SIGNIFICANT CHANGE UP (ref 32–36)
MCV RBC AUTO: 93.5 FL — SIGNIFICANT CHANGE UP (ref 80–100)
PLATELET # BLD AUTO: 324 K/UL — SIGNIFICANT CHANGE UP (ref 150–400)
POTASSIUM SERPL-MCNC: 4.2 MMOL/L — SIGNIFICANT CHANGE UP (ref 3.5–5.3)
POTASSIUM SERPL-SCNC: 4.2 MMOL/L — SIGNIFICANT CHANGE UP (ref 3.5–5.3)
RBC # BLD: 3.09 M/UL — LOW (ref 4.2–5.8)
RBC # FLD: 13.6 % — SIGNIFICANT CHANGE UP (ref 10.3–14.5)
SODIUM SERPL-SCNC: 139 MMOL/L — SIGNIFICANT CHANGE UP (ref 135–145)
WBC # BLD: 9.09 K/UL — SIGNIFICANT CHANGE UP (ref 3.8–10.5)
WBC # FLD AUTO: 9.09 K/UL — SIGNIFICANT CHANGE UP (ref 3.8–10.5)

## 2022-07-18 PROCEDURE — 99232 SBSQ HOSP IP/OBS MODERATE 35: CPT

## 2022-07-18 RX ORDER — OXYCODONE HYDROCHLORIDE 5 MG/1
5 TABLET ORAL EVERY 6 HOURS
Refills: 0 | Status: DISCONTINUED | OUTPATIENT
Start: 2022-07-18 | End: 2022-07-20

## 2022-07-18 RX ADMIN — AMIODARONE HYDROCHLORIDE 100 MILLIGRAM(S): 400 TABLET ORAL at 05:54

## 2022-07-18 RX ADMIN — OXYCODONE HYDROCHLORIDE 5 MILLIGRAM(S): 5 TABLET ORAL at 18:39

## 2022-07-18 RX ADMIN — ATORVASTATIN CALCIUM 20 MILLIGRAM(S): 80 TABLET, FILM COATED ORAL at 22:35

## 2022-07-18 RX ADMIN — ENOXAPARIN SODIUM 40 MILLIGRAM(S): 100 INJECTION SUBCUTANEOUS at 18:05

## 2022-07-18 RX ADMIN — Medication 75 MICROGRAM(S): at 05:54

## 2022-07-18 RX ADMIN — Medication 325 MILLIGRAM(S): at 17:44

## 2022-07-18 RX ADMIN — Medication 81 MILLIGRAM(S): at 12:08

## 2022-07-18 RX ADMIN — INSULIN GLARGINE 15 UNIT(S): 100 INJECTION, SOLUTION SUBCUTANEOUS at 22:36

## 2022-07-18 RX ADMIN — INSULIN GLARGINE 15 UNIT(S): 100 INJECTION, SOLUTION SUBCUTANEOUS at 08:44

## 2022-07-18 RX ADMIN — MIDODRINE HYDROCHLORIDE 15 MILLIGRAM(S): 2.5 TABLET ORAL at 22:38

## 2022-07-18 RX ADMIN — Medication 1 APPLICATION(S): at 05:53

## 2022-07-18 RX ADMIN — Medication 2: at 08:45

## 2022-07-18 RX ADMIN — Medication 6: at 12:00

## 2022-07-18 RX ADMIN — Medication 325 MILLIGRAM(S): at 05:54

## 2022-07-18 RX ADMIN — MIDODRINE HYDROCHLORIDE 15 MILLIGRAM(S): 2.5 TABLET ORAL at 13:52

## 2022-07-18 RX ADMIN — Medication 1 MILLIGRAM(S): at 12:08

## 2022-07-18 RX ADMIN — Medication 6: at 22:36

## 2022-07-18 RX ADMIN — Medication 1 APPLICATION(S): at 17:46

## 2022-07-18 RX ADMIN — Medication 4: at 17:46

## 2022-07-18 RX ADMIN — PREGABALIN 1000 MICROGRAM(S): 225 CAPSULE ORAL at 12:08

## 2022-07-18 RX ADMIN — OXYCODONE HYDROCHLORIDE 5 MILLIGRAM(S): 5 TABLET ORAL at 18:04

## 2022-07-18 NOTE — PROGRESS NOTE ADULT - ASSESSMENT
Pt is 50yo M w history of diabetes,  A. fib, depression, hypotension, HLD, hypothyroid initially presented to [facility] with complaints of pain in his left hip. atdarlene had a hip fracture of his left hip in October 2021 and was repaired at [facility].  s/p left hip ORIF     >Left femur fracture s/p ORIF   -Pain control  -WBAT LLE   -Prevena vac removed 7/10   -pending acute rehab      >Chronic Orthostatic hypotension   -Multifactorial,  Likely Autonomic dysfunction from uncontrolled DM , Dehydration, Hypovolemia, Symptomatic anemia   -c/w midodrine   -bilateral lower ext ACE wrap at all time when awake  -s/p echo : Cardiology Consulted , Mobile structure noted on the anterior mitral leaflet likely , represents loose calcium. no acute interventions     >Anemia, postop acute blood loss  -monitor hemoglobin   -transfused 1 PRBC blood transfusion     >DM - uncontrolled   -ISS, monitor BS   -A1C -9  -adjust basal, ssi for adequate fsg control     >Hyponatremia  stable    >RAVEN on CKD3  -Monitor Renal function  -avoid nephrotoxics.   -monitor intake and output and Creatinine     >hx of Afib  -S/p ablation  -C/w amiodarone  -Not on AC    >Hypothyroid  -elevated TSH   -increased  Synthroid to 75mcg   - repeat tfts in 4-6 weeks as op     >HLD  -Atorvastatin    >DVT prophylaxis: Lovenox x 4 weeks, plan for  Acute rehab, pending placement . discussed with aleena/ rodney 7/17: no beds yet

## 2022-07-18 NOTE — DISCHARGE NOTE NURSING/CASE MANAGEMENT/SOCIAL WORK - PATIENT PORTAL LINK FT
You can access the FollowMyHealth Patient Portal offered by [facility] by registering at the following website: [email or web address]. By joining [employer]’s FollowMyHealth portal, you will also be able to view your health information using other applications (apps) compatible with our system.

## 2022-07-18 NOTE — DISCHARGE NOTE NURSING/CASE MANAGEMENT/SOCIAL WORK - NSDCPEFALRISK_GEN_ALL_CORE
For information on Fall & Injury Prevention, visit: [email or web address] OR  [email or web address] OR  https://www.cdc.gov/steadi/patient.html

## 2022-07-18 NOTE — PROGRESS NOTE ADULT - SUBJECTIVE AND OBJECTIVE BOX
ELZBIETA CÁRDENAS    [id number]    51y      Male    INTERVAL HPI/OVERNIGHT EVENTS: patient being seen for hip fracture. Patient seen at bedside and states feeling well    patient is awaiting bed in [place]    REVIEW OF SYSTEMS:    CONSTITUTIONAL: No fever, weight loss, or fatigue  RESPIRATORY: No cough, wheezing, hemoptysis; No shortness of breath  CARDIOVASCULAR: No chest pain, palpitations  GASTROINTESTINAL: No abdominal or epigastric pain. No nausea, vomiting  NEUROLOGICAL: No headaches, memory loss, loss of strength.  MISCELLANEOUS:      Vital Signs Last 24 Hrs  T(C): 36.5 (19 Jul 2022 04:06), Max: 36.9 (18 Jul 2022 15:55)  T(F): 97.7 (19 Jul 2022 04:06), Max: 98.5 (18 Jul 2022 15:55)  HR: 59 (19 Jul 2022 04:06) (59 - 112)  BP: 127/79 (19 Jul 2022 04:06) (92/55 - 127/79)  BP(mean): --  RR: 18 (19 Jul 2022 04:06) (18 - 18)  SpO2: 96% (19 Jul 2022 04:06) (96% - 100%)    Parameters below as of 19 Jul 2022 04:06  Patient On (Oxygen Delivery Method): room air        PHYSICAL EXAM:    Constitutional: NAD, awake    HEENT: PERR, EOMI,   Neck: Soft and supple,    Respiratory: Breath sounds are clear bilaterally,   Cardiovascular: S1 and S2,    Gastrointestinal: Bowel Sounds present, soft, nontender,    Extremities: left lower ext +2   Left legs wound: clean   Vascular: 2+ peripheral pulses  Neurological: A/O x 3,  no focal motor or sensory deficits       LABS:                        9.4    9.09  )-----------( 324      ( 18 Jul 2022 05:19 )             28.9     07-19    138  |  103  |  21.8<H>  ----------------------------<  132<H>  4.0   |  25.0  |  1.63<H>    Ca    8.7      19 Jul 2022 05:40  Mg     1.6     07-19    TPro  6.3<L>  /  Alb  3.1<L>  /  TBili  0.6  /  DBili  x   /  AST  17  /  ALT  11  /  AlkPhos  251<H>  07-19            MEDICATIONS  (STANDING):  aMIOdarone    Tablet 100 milliGRAM(s) Oral daily  AQUAPHOR (petrolatum Ointment) 1 Application(s) Topical two times a day  aspirin enteric coated 81 milliGRAM(s) Oral daily  atorvastatin 20 milliGRAM(s) Oral at bedtime  cyanocobalamin 1000 MICROGram(s) Oral daily  dextrose 5%. 1000 milliLiter(s) (50 mL/Hr) IV Continuous <Continuous>  dextrose 5%. 1000 milliLiter(s) (100 mL/Hr) IV Continuous <Continuous>  dextrose 50% Injectable 25 Gram(s) IV Push once  dextrose 50% Injectable 12.5 Gram(s) IV Push once  dextrose 50% Injectable 25 Gram(s) IV Push once  enoxaparin Injectable 40 milliGRAM(s) SubCutaneous every 24 hours  ferrous    sulfate 325 milliGRAM(s) Oral two times a day  folic acid 1 milliGRAM(s) Oral daily  glucagon  Injectable 1 milliGRAM(s) IntraMuscular once  insulin glargine Injectable (LANTUS) 15 Unit(s) SubCutaneous two times a day  insulin lispro (ADMELOG) corrective regimen sliding scale   SubCutaneous Before meals and at bedtime  levothyroxine 75 MICROGram(s) Oral daily  midodrine. 15 milliGRAM(s) Oral every 8 hours    MEDICATIONS  (PRN):  bisacodyl Suppository 10 milliGRAM(s) Rectal daily PRN If no bowel movement  dextrose Oral Gel 15 Gram(s) Oral once PRN Blood Glucose LESS THAN 70 milliGRAM(s)/deciliter  magnesium hydroxide Suspension 30 milliLiter(s) Oral daily PRN Constipation  ondansetron Injectable 4 milliGRAM(s) IV Push every 6 hours PRN Nausea and/or Vomiting  oxyCODONE    IR 5 milliGRAM(s) Oral every 6 hours PRN Moderate Pain (4 - 6)      RADIOLOGY & ADDITIONAL TESTS:

## 2022-07-19 LAB
ALBUMIN SERPL ELPH-MCNC: 3.1 G/DL — LOW (ref 3.3–5.2)
ALP SERPL-CCNC: 251 U/L — HIGH (ref 40–120)
ALT FLD-CCNC: 11 U/L — SIGNIFICANT CHANGE UP
ANION GAP SERPL CALC-SCNC: 10 MMOL/L — SIGNIFICANT CHANGE UP (ref 5–17)
AST SERPL-CCNC: 17 U/L — SIGNIFICANT CHANGE UP
BILIRUB SERPL-MCNC: 0.6 MG/DL — SIGNIFICANT CHANGE UP (ref 0.4–2)
BUN SERPL-MCNC: 21.8 MG/DL — HIGH (ref 8–20)
CALCIUM SERPL-MCNC: 8.7 MG/DL — SIGNIFICANT CHANGE UP (ref 8.6–10.2)
CHLORIDE SERPL-SCNC: 103 MMOL/L — SIGNIFICANT CHANGE UP (ref 98–107)
CO2 SERPL-SCNC: 25 MMOL/L — SIGNIFICANT CHANGE UP (ref 22–29)
CREAT SERPL-MCNC: 1.63 MG/DL — HIGH (ref 0.5–1.3)
EGFR: 50 ML/MIN/1.73M2 — LOW
GLUCOSE BLDC GLUCOMTR-MCNC: 133 MG/DL — HIGH (ref 70–99)
GLUCOSE BLDC GLUCOMTR-MCNC: 171 MG/DL — HIGH (ref 70–99)
GLUCOSE BLDC GLUCOMTR-MCNC: 254 MG/DL — HIGH (ref 70–99)
GLUCOSE BLDC GLUCOMTR-MCNC: 320 MG/DL — HIGH (ref 70–99)
GLUCOSE SERPL-MCNC: 132 MG/DL — HIGH (ref 70–99)
MAGNESIUM SERPL-MCNC: 1.6 MG/DL — SIGNIFICANT CHANGE UP (ref 1.6–2.6)
POTASSIUM SERPL-MCNC: 4 MMOL/L — SIGNIFICANT CHANGE UP (ref 3.5–5.3)
POTASSIUM SERPL-SCNC: 4 MMOL/L — SIGNIFICANT CHANGE UP (ref 3.5–5.3)
PROT SERPL-MCNC: 6.3 G/DL — LOW (ref 6.6–8.7)
SODIUM SERPL-SCNC: 138 MMOL/L — SIGNIFICANT CHANGE UP (ref 135–145)

## 2022-07-19 PROCEDURE — 99232 SBSQ HOSP IP/OBS MODERATE 35: CPT

## 2022-07-19 RX ADMIN — Medication 1 APPLICATION(S): at 06:10

## 2022-07-19 RX ADMIN — Medication 325 MILLIGRAM(S): at 06:10

## 2022-07-19 RX ADMIN — MIDODRINE HYDROCHLORIDE 15 MILLIGRAM(S): 2.5 TABLET ORAL at 11:57

## 2022-07-19 RX ADMIN — Medication 81 MILLIGRAM(S): at 11:56

## 2022-07-19 RX ADMIN — Medication 325 MILLIGRAM(S): at 17:32

## 2022-07-19 RX ADMIN — ENOXAPARIN SODIUM 40 MILLIGRAM(S): 100 INJECTION SUBCUTANEOUS at 17:32

## 2022-07-19 RX ADMIN — AMIODARONE HYDROCHLORIDE 100 MILLIGRAM(S): 400 TABLET ORAL at 06:09

## 2022-07-19 RX ADMIN — INSULIN GLARGINE 15 UNIT(S): 100 INJECTION, SOLUTION SUBCUTANEOUS at 21:49

## 2022-07-19 RX ADMIN — ATORVASTATIN CALCIUM 20 MILLIGRAM(S): 80 TABLET, FILM COATED ORAL at 21:49

## 2022-07-19 RX ADMIN — PREGABALIN 1000 MICROGRAM(S): 225 CAPSULE ORAL at 11:56

## 2022-07-19 RX ADMIN — INSULIN GLARGINE 15 UNIT(S): 100 INJECTION, SOLUTION SUBCUTANEOUS at 08:48

## 2022-07-19 RX ADMIN — Medication 2: at 08:47

## 2022-07-19 RX ADMIN — Medication 1 MILLIGRAM(S): at 11:56

## 2022-07-19 RX ADMIN — Medication 1 APPLICATION(S): at 17:33

## 2022-07-19 RX ADMIN — Medication 8: at 11:55

## 2022-07-19 RX ADMIN — MIDODRINE HYDROCHLORIDE 15 MILLIGRAM(S): 2.5 TABLET ORAL at 06:10

## 2022-07-19 RX ADMIN — MIDODRINE HYDROCHLORIDE 15 MILLIGRAM(S): 2.5 TABLET ORAL at 21:48

## 2022-07-19 RX ADMIN — Medication 75 MICROGRAM(S): at 06:10

## 2022-07-19 RX ADMIN — Medication 6: at 21:51

## 2022-07-19 NOTE — PROGRESS NOTE ADULT - SUBJECTIVE AND OBJECTIVE BOX
Patient reports he thinks that his strength is improving.  Continuing to work well with therapy.      REVIEW OF SYSTEMS  Constitutional - No fever,  No fatigue  HEENT - No vertigo, No neck pain  Neurological - No headaches, No memory loss, + loss of strength, No numbness, No tremors  Skin - No rashes, No lesions   Musculoskeletal - + joint pain, No joint swelling, No muscle pain    FUNCTIONAL PROGRESS  7/19/21      Sit-Stand Transfer Training  Transfer Training Sit-to-Stand Transfer: supervsion;  weight-bearing as tolerated   rolling walker  Transfer Training Stand-to-Sit Transfer: supervsion;  weight-bearing as tolerated   rolling walker  Sit-to-Stand Transfer Training Transfer Safety Analysis: decreased balance;  decreased ROM;  decreased flexibility;  decreased strength;  impaired balance    Gait Training  Gait Training: stand-by assist;  weight-bearing as tolerated   rolling walker;  100 feet  Gait Analysis: decreased strength;  impaired balance;  decreased ROM;  decreased flexibility;  pain;  100 feet  Gait Number of Times:: x 2    Stair Training  Physical Assist/Nonphysical Assist: supervision  Weight-Bearing Restrictions: weight-bearing as tolerated  Assistive Device: bilateral rails  Number of Stairs: 5       VITALS  T(C): 36.7 (07-19-22 @ 16:40), Max: 36.7 (07-19-22 @ 16:40)  HR: 62 (07-19-22 @ 16:40) (59 - 87)  BP: 133/76 (07-19-22 @ 16:40) (80/50 - 133/76)  RR: 18 (07-19-22 @ 16:40) (18 - 18)  SpO2: 99% (07-19-22 @ 16:40) (96% - 99%)  Wt(kg): --    MEDICATIONS   aMIOdarone    Tablet 100 milliGRAM(s) daily  AQUAPHOR (petrolatum Ointment) 1 Application(s) two times a day  aspirin enteric coated 81 milliGRAM(s) daily  atorvastatin 20 milliGRAM(s) at bedtime  bisacodyl Suppository 10 milliGRAM(s) daily PRN  cyanocobalamin 1000 MICROGram(s) daily  dextrose 5%. 1000 milliLiter(s) <Continuous>  dextrose 5%. 1000 milliLiter(s) <Continuous>  dextrose 50% Injectable 25 Gram(s) once  dextrose 50% Injectable 12.5 Gram(s) once  dextrose 50% Injectable 25 Gram(s) once  dextrose Oral Gel 15 Gram(s) once PRN  enoxaparin Injectable 40 milliGRAM(s) every 24 hours  ferrous    sulfate 325 milliGRAM(s) two times a day  folic acid 1 milliGRAM(s) daily  glucagon  Injectable 1 milliGRAM(s) once  insulin glargine Injectable (LANTUS) 15 Unit(s) two times a day  insulin lispro (ADMELOG) corrective regimen sliding scale   Before meals and at bedtime  levothyroxine 75 MICROGram(s) daily  magnesium hydroxide Suspension 30 milliLiter(s) daily PRN  midodrine. 15 milliGRAM(s) every 8 hours  ondansetron Injectable 4 milliGRAM(s) every 6 hours PRN  oxyCODONE    IR 5 milliGRAM(s) every 6 hours PRN      RECENT LABS/IMAGING - Reviewed                        9.4    9.09  )-----------( 324      ( 18 Jul 2022 05:19 )             28.9     07-19    138  |  103  |  21.8<H>  ----------------------------<  132<H>  4.0   |  25.0  |  1.63<H>    Ca    8.7      19 Jul 2022 05:40  Mg     1.6     07-19    TPro  6.3<L>  /  Alb  3.1<L>  /  TBili  0.6  /  DBili  x   /  AST  17  /  ALT  11  /  AlkPhos  251<H>  07-19      XR LEFT HIP 7/2 - There is a proximal short intramedullary keith in the left femur. The distal fixation screws fractured. There is sliding screw transfixes a intratrochanteric region fracture there is callus formation. There is a new proximal femoral fracture in the region of the intramedullary keith.    ----------------------------------------------------------------------------------------  PHYSICAL EXAM  Constitutional - NAD, Comfortable  Extremities - No edema   Neurologic Exam -                    Cognitive - AAOx4     Motor - Motor - 5/5 in B/L UE and RLE, LLE 4/5 in hip flexion, 5/5 in ankle dorsi-plantar flexion      Sensory - BLE distal foot numbness  Psychiatric - Calm   ----------------------------------------------------------------------------------------  ASSESSMENT/PLAN  51yMale with functional deficits after a mechanical fall sustaining a periprosthetic fracture  Left  periprosthetic fracture s/p IMN - WBAT  DM - Lantus, Lispro  AFIB - Amiodarone  Orthostatic HypoTN - Midodrine  CAD - Lipitor, ASA  Pain - Tylenol, Oxycodone  DVT PPX - SCDs, Lovenox   Rehab - Continue PT/OT.  Patient demonstrating functional gains.  Discussed rehabilitation options with patient including acute rehabilitation.  He would prefer to go home.  Expect patient to achieve functional goals for discharge to home with assistance and home therapies.        Will continue to follow. Rehab recommendations are dependent on how functional progress changes as well as how patient continues to participate and tolerate therapeutic interventions, which may change. Recommend ongoing mobilization by staff to maintain cardiopulmonary function and prevention of secondary complications related to debility.

## 2022-07-19 NOTE — PROGRESS NOTE ADULT - SUBJECTIVE AND OBJECTIVE BOX
ELZBIETA CÁRDENAS    [id number]    51y      Male    INTERVAL HPI/OVERNIGHT EVENTS: patient being seen for hip fx. Patient seen at bedside and states feeling better.     patient still awaiting bed in acute rehab .     REVIEW OF SYSTEMS:    CONSTITUTIONAL: No fever, weight loss, or fatigue  RESPIRATORY: No cough, wheezing, hemoptysis; No shortness of breath  CARDIOVASCULAR: No chest pain, palpitations  GASTROINTESTINAL: No abdominal or epigastric pain. No nausea, vomiting  NEUROLOGICAL: No headaches, memory loss, loss of strength.  MISCELLANEOUS:      Vital Signs Last 24 Hrs  T(C): 36.6 (19 Jul 2022 10:58), Max: 36.9 (18 Jul 2022 15:55)  T(F): 97.9 (19 Jul 2022 10:58), Max: 98.5 (18 Jul 2022 15:55)  HR: 87 (19 Jul 2022 10:58) (59 - 112)  BP: 80/50 (19 Jul 2022 10:58) (80/50 - 127/79)  BP(mean): --  RR: 18 (19 Jul 2022 10:58) (18 - 18)  SpO2: 98% (19 Jul 2022 10:58) (96% - 100%)    Parameters below as of 19 Jul 2022 10:58  Patient On (Oxygen Delivery Method): room air        PHYSICAL EXAM:  Constitutional: NAD, awake    HEENT: PERR, EOMI,   Neck: Soft and supple,    Respiratory: Breath sounds are clear bilaterally,   Cardiovascular: S1 and S2,    Gastrointestinal: Bowel Sounds present, soft, nontender,    Extremities: left lower ext +2   Left legs wound: clean   Vascular: 2+ peripheral pulses  Neurological: A/O x 3,  no focal motor or sensory deficits       LABS:                        9.4    9.09  )-----------( 324      ( 18 Jul 2022 05:19 )             28.9     07-19    138  |  103  |  21.8<H>  ----------------------------<  132<H>  4.0   |  25.0  |  1.63<H>    Ca    8.7      19 Jul 2022 05:40  Mg     1.6     07-19    TPro  6.3<L>  /  Alb  3.1<L>  /  TBili  0.6  /  DBili  x   /  AST  17  /  ALT  11  /  AlkPhos  251<H>  07-19      MEDICATIONS  (STANDING):  aMIOdarone    Tablet 100 milliGRAM(s) Oral daily  AQUAPHOR (petrolatum Ointment) 1 Application(s) Topical two times a day  aspirin enteric coated 81 milliGRAM(s) Oral daily  atorvastatin 20 milliGRAM(s) Oral at bedtime  cyanocobalamin 1000 MICROGram(s) Oral daily  dextrose 5%. 1000 milliLiter(s) (50 mL/Hr) IV Continuous <Continuous>  dextrose 5%. 1000 milliLiter(s) (100 mL/Hr) IV Continuous <Continuous>  dextrose 50% Injectable 25 Gram(s) IV Push once  dextrose 50% Injectable 25 Gram(s) IV Push once  dextrose 50% Injectable 12.5 Gram(s) IV Push once  enoxaparin Injectable 40 milliGRAM(s) SubCutaneous every 24 hours  ferrous    sulfate 325 milliGRAM(s) Oral two times a day  folic acid 1 milliGRAM(s) Oral daily  glucagon  Injectable 1 milliGRAM(s) IntraMuscular once  insulin glargine Injectable (LANTUS) 15 Unit(s) SubCutaneous two times a day  insulin lispro (ADMELOG) corrective regimen sliding scale   SubCutaneous Before meals and at bedtime  levothyroxine 75 MICROGram(s) Oral daily  midodrine. 15 milliGRAM(s) Oral every 8 hours    MEDICATIONS  (PRN):  bisacodyl Suppository 10 milliGRAM(s) Rectal daily PRN If no bowel movement  dextrose Oral Gel 15 Gram(s) Oral once PRN Blood Glucose LESS THAN 70 milliGRAM(s)/deciliter  magnesium hydroxide Suspension 30 milliLiter(s) Oral daily PRN Constipation  ondansetron Injectable 4 milliGRAM(s) IV Push every 6 hours PRN Nausea and/or Vomiting  oxyCODONE    IR 5 milliGRAM(s) Oral every 6 hours PRN Moderate Pain (4 - 6)      RADIOLOGY & ADDITIONAL TESTS:

## 2022-07-19 NOTE — PROGRESS NOTE ADULT - NUTRITIONAL ASSESSMENT
This patient has been assessed with a concern for Malnutrition and has been determined to have a diagnosis/diagnoses of Moderate protein-calorie malnutrition.    This patient is being managed with:   Diet Consistent Carbohydrate w/Evening Snack-  Entered: Jul 5 2022  3:52PM    

## 2022-07-19 NOTE — PROGRESS NOTE ADULT - ASSESSMENT
Pt is 52yo M w history of diabetes,  A. fib, depression, hypotension, HLD, hypothyroid initially presented to [facility] with complaints of pain in his left hip. marlyn had a hip fracture of his left hip in October 2021 and was repaired at [facility].  s/p left hip ORIF     >Left femur fracture s/p ORIF   -Pain control  -WBAT LLE   -Prevena vac removed 7/10   -pending acute rehab      >Chronic Orthostatic hypotension   -Multifactorial,  Likely Autonomic dysfunction from uncontrolled DM , Dehydration, Hypovolemia, Symptomatic anemia   -c/w midodrine   -bilateral lower ext ACE wrap at all time when awake  -s/p echo : Cardiology Consulted , Mobile structure noted on the anterior mitral leaflet likely , represents loose calcium. no acute interventions     >Anemia, postop acute blood loss  -monitor hemoglobin   -transfused 1 PRBC    >DM - uncontrolled   -ISS, monitor BS   -A1C -9  -adjust basal, ssi for adequate fsg control     >Hyponatremia  stable    >RAVEN on CKD3  -Monitor Renal function  -avoid nephrotoxics.   -monitor intake and output and Creatinine     >hx of Afib  -S/p ablation  -C/w amiodarone  -Not on AC    >Hypothyroid  -elevated TSH   -increased  Synthroid to 75mcg   - repeat tfts in 4-6 weeks as op     >HLD  -Atorvastatin    >DVT prophylaxis: Lovenox x 4 weeks, plan for  Acute rehab, pending placement vs home with home care

## 2022-07-20 VITALS
HEART RATE: 72 BPM | SYSTOLIC BLOOD PRESSURE: 92 MMHG | TEMPERATURE: 98 F | DIASTOLIC BLOOD PRESSURE: 61 MMHG | OXYGEN SATURATION: 97 % | RESPIRATION RATE: 18 BRPM

## 2022-07-20 LAB — GLUCOSE BLDC GLUCOMTR-MCNC: 218 MG/DL — HIGH (ref 70–99)

## 2022-07-20 PROCEDURE — 36430 TRANSFUSION BLD/BLD COMPNT: CPT

## 2022-07-20 PROCEDURE — 73502 X-RAY EXAM HIP UNI 2-3 VIEWS: CPT

## 2022-07-20 PROCEDURE — 86140 C-REACTIVE PROTEIN: CPT

## 2022-07-20 PROCEDURE — 82652 VIT D 1 25-DIHYDROXY: CPT

## 2022-07-20 PROCEDURE — 72192 CT PELVIS W/O DYE: CPT | Mod: MA

## 2022-07-20 PROCEDURE — 83550 IRON BINDING TEST: CPT

## 2022-07-20 PROCEDURE — U0003: CPT

## 2022-07-20 PROCEDURE — 82747 ASSAY OF FOLIC ACID RBC: CPT

## 2022-07-20 PROCEDURE — 93306 TTE W/DOPPLER COMPLETE: CPT

## 2022-07-20 PROCEDURE — 97530 THERAPEUTIC ACTIVITIES: CPT

## 2022-07-20 PROCEDURE — 83735 ASSAY OF MAGNESIUM: CPT

## 2022-07-20 PROCEDURE — 97163 PT EVAL HIGH COMPLEX 45 MIN: CPT

## 2022-07-20 PROCEDURE — 82962 GLUCOSE BLOOD TEST: CPT

## 2022-07-20 PROCEDURE — 80053 COMPREHEN METABOLIC PANEL: CPT

## 2022-07-20 PROCEDURE — C1713: CPT

## 2022-07-20 PROCEDURE — 84100 ASSAY OF PHOSPHORUS: CPT

## 2022-07-20 PROCEDURE — 82306 VITAMIN D 25 HYDROXY: CPT

## 2022-07-20 PROCEDURE — 97110 THERAPEUTIC EXERCISES: CPT

## 2022-07-20 PROCEDURE — 80061 LIPID PANEL: CPT

## 2022-07-20 PROCEDURE — 99233 SBSQ HOSP IP/OBS HIGH 50: CPT

## 2022-07-20 PROCEDURE — C1889: CPT

## 2022-07-20 PROCEDURE — 99239 HOSP IP/OBS DSCHRG MGMT >30: CPT

## 2022-07-20 PROCEDURE — 97535 SELF CARE MNGMENT TRAINING: CPT

## 2022-07-20 PROCEDURE — 76000 FLUOROSCOPY <1 HR PHYS/QHP: CPT

## 2022-07-20 PROCEDURE — 84443 ASSAY THYROID STIM HORMONE: CPT

## 2022-07-20 PROCEDURE — 85027 COMPLETE CBC AUTOMATED: CPT

## 2022-07-20 PROCEDURE — 82607 VITAMIN B-12: CPT

## 2022-07-20 PROCEDURE — 87070 CULTURE OTHR SPECIMN AEROBIC: CPT

## 2022-07-20 PROCEDURE — 80048 BASIC METABOLIC PNL TOTAL CA: CPT

## 2022-07-20 PROCEDURE — 85652 RBC SED RATE AUTOMATED: CPT

## 2022-07-20 PROCEDURE — 82728 ASSAY OF FERRITIN: CPT

## 2022-07-20 PROCEDURE — U0005: CPT

## 2022-07-20 PROCEDURE — 85610 PROTHROMBIN TIME: CPT

## 2022-07-20 PROCEDURE — 86900 BLOOD TYPING SEROLOGIC ABO: CPT

## 2022-07-20 PROCEDURE — 93005 ELECTROCARDIOGRAM TRACING: CPT

## 2022-07-20 PROCEDURE — 84436 ASSAY OF TOTAL THYROXINE: CPT

## 2022-07-20 PROCEDURE — 36415 COLL VENOUS BLD VENIPUNCTURE: CPT

## 2022-07-20 PROCEDURE — 85025 COMPLETE CBC W/AUTO DIFF WBC: CPT

## 2022-07-20 PROCEDURE — 86901 BLOOD TYPING SEROLOGIC RH(D): CPT

## 2022-07-20 PROCEDURE — 86850 RBC ANTIBODY SCREEN: CPT

## 2022-07-20 PROCEDURE — 73552 X-RAY EXAM OF FEMUR 2/>: CPT

## 2022-07-20 PROCEDURE — 85730 THROMBOPLASTIN TIME PARTIAL: CPT

## 2022-07-20 PROCEDURE — 82533 TOTAL CORTISOL: CPT

## 2022-07-20 PROCEDURE — 84145 PROCALCITONIN (PCT): CPT

## 2022-07-20 PROCEDURE — 87102 FUNGUS ISOLATION CULTURE: CPT

## 2022-07-20 PROCEDURE — 87075 CULTR BACTERIA EXCEPT BLOOD: CPT

## 2022-07-20 PROCEDURE — 83036 HEMOGLOBIN GLYCOSYLATED A1C: CPT

## 2022-07-20 PROCEDURE — P9016: CPT

## 2022-07-20 PROCEDURE — 84466 ASSAY OF TRANSFERRIN: CPT

## 2022-07-20 PROCEDURE — 86923 COMPATIBILITY TEST ELECTRIC: CPT

## 2022-07-20 PROCEDURE — 83540 ASSAY OF IRON: CPT

## 2022-07-20 PROCEDURE — 97116 GAIT TRAINING THERAPY: CPT

## 2022-07-20 RX ORDER — ENOXAPARIN SODIUM 100 MG/ML
40 INJECTION SUBCUTANEOUS
Qty: 11 | Refills: 0
Start: 2022-07-20 | End: 2022-07-30

## 2022-07-20 RX ADMIN — AMIODARONE HYDROCHLORIDE 100 MILLIGRAM(S): 400 TABLET ORAL at 06:11

## 2022-07-20 RX ADMIN — MIDODRINE HYDROCHLORIDE 15 MILLIGRAM(S): 2.5 TABLET ORAL at 05:44

## 2022-07-20 RX ADMIN — Medication 4: at 08:22

## 2022-07-20 RX ADMIN — Medication 75 MICROGRAM(S): at 05:44

## 2022-07-20 RX ADMIN — Medication 1 APPLICATION(S): at 05:48

## 2022-07-20 RX ADMIN — Medication 325 MILLIGRAM(S): at 05:45

## 2022-07-20 RX ADMIN — INSULIN GLARGINE 15 UNIT(S): 100 INJECTION, SOLUTION SUBCUTANEOUS at 08:23

## 2022-07-20 NOTE — PROGRESS NOTE ADULT - SUBJECTIVE AND OBJECTIVE BOX
Patient worked with PT and pending  DC HOME today.     REVIEW OF SYSTEMS  Constitutional - No fever,  No fatigue  HEENT - No vertigo, No neck pain  Neurological - No headaches, No memory loss, + loss of strength, No numbness, No tremors  Musculoskeletal - +joint pain, No joint swelling, +muscle pain  Psychiatric - No depression, No anxiety    FUNCTIONAL PROGRESS  7/20 PT  Bed Mobility  Bed Mobility Training Rehab Potential: good, to achieve stated therapy goals  Bed Mobility Training Symptoms Noted During/After Treatment: none  Bed Mobility Training Sit-to-Supine: independent  Bed Mobility Training Supine-to-Sit: independent    Sit-Stand Transfer Training  Sit-to-Stand Transfer Training Rehab Potential: good, to achieve stated therapy goals  Sit-to-Stand Transfer Training Symptoms Noted During/After Treatment: none  Transfer Training Sit-to-Stand Transfer: independent;  weight-bearing as tolerated   rolling walker  Transfer Training Stand-to-Sit Transfer: independent;  weight-bearing as tolerated   rolling walker    Gait Training  Gait Training Rehab Potential: good, to achieve stated therapy goals  Gait Training Symptoms Noted During/After Treatment: none  Gait Training: independent;  weight-bearing as tolerated   rolling walker;  200 feet  Gait Analysis: swing-through gait    Stair Training  Physical Assist/Nonphysical Assist: Modified Independent   Weight-Bearing Restrictions: weight-bearing as tolerated  Assistive Device: right rail down;  right rail up  Number of Stairs: 14       VITALS  T(C): 36.7 (07-20-22 @ 09:59), Max: 37.1 (07-19-22 @ 20:32)  HR: 72 (07-20-22 @ 09:59) (62 - 72)  BP: 92/61 (07-20-22 @ 09:59) (92/61 - 148/88)  RR: 18 (07-20-22 @ 09:59) (18 - 19)  SpO2: 97% (07-20-22 @ 09:59) (97% - 99%)  Wt(kg): --    MEDICATIONS   aMIOdarone    Tablet 100 milliGRAM(s) daily  AQUAPHOR (petrolatum Ointment) 1 Application(s) two times a day  aspirin enteric coated 81 milliGRAM(s) daily  atorvastatin 20 milliGRAM(s) at bedtime  bisacodyl Suppository 10 milliGRAM(s) daily PRN  cyanocobalamin 1000 MICROGram(s) daily  dextrose 5%. 1000 milliLiter(s) <Continuous>  dextrose 5%. 1000 milliLiter(s) <Continuous>  dextrose 50% Injectable 25 Gram(s) once  dextrose 50% Injectable 25 Gram(s) once  dextrose 50% Injectable 12.5 Gram(s) once  dextrose Oral Gel 15 Gram(s) once PRN  enoxaparin Injectable 40 milliGRAM(s) every 24 hours  ferrous    sulfate 325 milliGRAM(s) two times a day  folic acid 1 milliGRAM(s) daily  glucagon  Injectable 1 milliGRAM(s) once  insulin glargine Injectable (LANTUS) 15 Unit(s) two times a day  insulin lispro (ADMELOG) corrective regimen sliding scale   Before meals and at bedtime  levothyroxine 75 MICROGram(s) daily  magnesium hydroxide Suspension 30 milliLiter(s) daily PRN  midodrine. 15 milliGRAM(s) every 8 hours  ondansetron Injectable 4 milliGRAM(s) every 6 hours PRN  oxyCODONE    IR 5 milliGRAM(s) every 6 hours PRN      RECENT LABS/IMAGING      07-19    138  |  103  |  21.8<H>  ----------------------------<  132<H>  4.0   |  25.0  |  1.63<H>    Ca    8.7      19 Jul 2022 05:40  Mg     1.6     07-19    TPro  6.3<L>  /  Alb  3.1<L>  /  TBili  0.6  /  DBili  x   /  AST  17  /  ALT  11  /  AlkPhos  251<H>  07-19                  XR LEFT HIP 7/2 - There is a proximal short intramedullary keith in the left femur. The distal fixation screws fractured. There is sliding screw transfixes a intratrochanteric region fracture there is callus formation. There is a new proximal femoral fracture in the region of the intramedullary keith.    TTE 7/14 - Summary:   1. Left ventricular ejection fraction, by visual estimation, is 60 to   65%.   2. Normal global left ventricular systolic function.   3. Normal left ventricular internal cavity size.   4. Normal right ventricular size and function.   5. Normal left atrial size.   6. Rheumatic mitral valve.   7. Moderate thickening and calcification of the anterior mitral valve   leaflet.   8. Mobile structure noted on the anterior mitral leaflet likely   represents loose calcium.   9. Trace mitral valve regurgitation.  10. Mild aortic valve stenosis.  11. Lipomatous hypertrophy of the intra-atrial septum.  12. There is no evidence of pericardial effusion.      ----------------------------------------------------------------------------------------  PHYSICAL EXAM  Constitutional - NAD, Comfortable  Extremities - No edema   Neurologic Exam -                    Cognitive - AAOx4     Motor - No focal deficits      Sensory - BLE distal foot numbness  Psychiatric - Calm   ----------------------------------------------------------------------------------------  ASSESSMENT/PLAN  51yMale with functional deficits after a mechanical fall sustaining a periprosthetic fracture  Left  periprosthetic fracture s/p IMN - WBAT  DM - Lantus, Lispro  AFIB - Amiodarone  Orthostatic HypoTN - Midodrine   CAD - Lipitor, ASA  Pain - Tylenol, Oxycodone  DVT PPX - SCDs, Lovenox   Rehab - Patient is independent, plan is HOME.    Will sign off at this time. Thank you for allowing me to be part of your patient's care. Please reconsult PMR for additional rehab recommendations or dispo needs if functional status changes.     Discussed with rehab clinical care team.

## 2022-07-20 NOTE — PROGRESS NOTE ADULT - REASON FOR ADMISSION
Left hip Fracture

## 2022-07-20 NOTE — PROGRESS NOTE ADULT - PROVIDER SPECIALTY LIST ADULT
Hospitalist
Hospitalist
Orthopedics
Orthopedics
Rehab Medicine
Rehab Medicine
Hospitalist
Orthopedics
Rehab Medicine
Hospitalist
Internal Medicine
Internal Medicine
Orthopedics
Rehab Medicine
Rehab Medicine
Hospitalist

## 2022-07-20 NOTE — PROGRESS NOTE ADULT - SUBJECTIVE AND OBJECTIVE BOX
Received call from RN dressings taken down with dried serous staining. patient seen at bedside, no dressing in place along incisions. small gauze with tape applied to posterior lateral proximal incisional area. gauze removed- small dime sized skin avulsion from previous dressing removal by RN. proximal and distal incision staples intact, no active drainage or discharge. no erythema. Gauze and tegaderm applied to incisions. patient instructed to follow-up with Dr. Kay within 1 week.

## 2022-07-21 DIAGNOSIS — Z87.81 PERSONAL HISTORY OF (HEALED) TRAUMATIC FRACTURE: ICD-10-CM

## 2022-07-21 PROBLEM — E11.9 TYPE 2 DIABETES MELLITUS WITHOUT COMPLICATIONS: Chronic | Status: ACTIVE | Noted: 2022-07-02

## 2022-07-22 LAB
CORTICOSTEROID BINDING GLOBULIN RESULT: 1.9 MG/DL — SIGNIFICANT CHANGE UP
CORTICOSTEROID BINDING GLOBULIN RESULT: 2.2 MG/DL — SIGNIFICANT CHANGE UP
CORTIS F/TOTAL MFR SERPL: 25 % — SIGNIFICANT CHANGE UP
CORTIS F/TOTAL MFR SERPL: 9.6 % — SIGNIFICANT CHANGE UP
CORTIS SERPL-MCNC: 13 UG/DL — SIGNIFICANT CHANGE UP
CORTIS SERPL-MCNC: 16 UG/DL — SIGNIFICANT CHANGE UP
CORTISOL, FREE RESULT: 1.3 UG/DL — SIGNIFICANT CHANGE UP
CORTISOL, FREE RESULT: 4 UG/DL — HIGH

## 2022-07-26 ENCOUNTER — APPOINTMENT (OUTPATIENT)
Dept: ORTHOPEDIC SURGERY | Facility: CLINIC | Age: 52
End: 2022-07-26

## 2022-07-26 PROCEDURE — 73552 X-RAY EXAM OF FEMUR 2/>: CPT | Mod: LT

## 2022-07-26 PROCEDURE — 99024 POSTOP FOLLOW-UP VISIT: CPT

## 2022-07-26 RX ORDER — OXYCODONE 5 MG/1
5 TABLET ORAL
Qty: 30 | Refills: 0 | Status: DISCONTINUED | COMMUNITY
Start: 2022-07-21 | End: 2022-07-26

## 2022-07-26 NOTE — HISTORY OF PRESENT ILLNESS
[None] : None [de-identified] : s/p left intertrochanteric nonunion repair with autograft. Intramedullary nail, left femur. Left hip arthrogram. 7/5/22 [de-identified] : Patient is a 51 year old male s/p left intertrochanteric nonunion repair with autograft. Intramedullary nail, left femur. Left hip arthrogram on 7/5/22. Patient reports he has been doing well since leaving the hospital, but has some occasional issues with pain control and would like a prescription for pain medication. He reports his pain is a 6/10 today. He has been ambulating with the walker but reports he still has some hesitation with bearing full weight. He denies fevers chills/wound erythema or drainage. [de-identified] : General:\par NAD\par AAOx4\par \par LLE:\par Well healed surgical incisions.\par Staples removed today and replaced with steri strips\par SILT L2-S1\par Gsc/TA/EHL/FHL intact\par DP/PT 2+\par No calf tenderness [de-identified] : 4-views of left femur: s/p intramedullary nail. Implants intact and in good position. Mild displacement of fracture since post-op, but stable and acceptable alignment. Evidence of callus formation. [de-identified] : Patient is a 51 year old male s/p left intertrochanteric nonunion repair with autograft. Intramedullary nail, left femur [de-identified] : Patient is recovering well. A prescription for pain medicine was provided today, but stressed that patient should start tapering narcotic medication and replacing with OTC Tylenol and Motrin. Patient may continue to be WBAT with no restrictions with the walker as needed. Patient should obtain an x-ray in 1 month to assess progression of healing and routine monitoring. I will call the patient with the results of the x-ray. If the patient has concerns or any further issues, he may alternatively follow-up in -person in our office. All questions were answered and patient expressed understanding.\par \par This note was written by Zraina Abdi MD PGY-5 Orthopaedic Surgery Resident acting as a scribe for Dr. Kay.\par \par Orthopaedic Trauma Surgeon Addendum:\par \par I agree with the above resident physician note.  \par Chart was reviewed and patient examined in the orthopedic office. I agree with the assessment and plan of the resident.\par \par I was physically present for the key portions of the evaluation and management service provided. I agree with the above history, physical and plan. Appropriate imaging has been reviewed and the plan adjusted as needed.\par \par Gary Kay MD\par Orthopaedic Trauma Surgeon\par [facility]\par [facility] [place]\par

## 2022-08-03 LAB
CULTURE RESULTS: SIGNIFICANT CHANGE UP
CULTURE RESULTS: SIGNIFICANT CHANGE UP
SPECIMEN SOURCE: SIGNIFICANT CHANGE UP
SPECIMEN SOURCE: SIGNIFICANT CHANGE UP

## 2022-08-24 ENCOUNTER — APPOINTMENT (OUTPATIENT)
Dept: ORTHOPEDIC SURGERY | Facility: CLINIC | Age: 52
End: 2022-08-24

## 2022-08-24 DIAGNOSIS — S72.342D DISPLACED SPIRAL FRACTURE OF SHAFT OF LEFT FEMUR, SUBSEQUENT ENCOUNTER FOR CLOSED FRACTURE WITH ROUTINE HEALING: ICD-10-CM

## 2022-08-24 PROCEDURE — 73552 X-RAY EXAM OF FEMUR 2/>: CPT | Mod: LT

## 2022-08-24 PROCEDURE — 99024 POSTOP FOLLOW-UP VISIT: CPT

## 2022-08-24 NOTE — HISTORY OF PRESENT ILLNESS
[None] : None [de-identified] : s/p left intertrochanteric nonunion repair with autograft. Intramedullary nail, left femur. Left hip arthrogram. 7/5/22 [de-identified] : Patient is a 52 year old male s/p left intertrochanteric nonunion repair with autograft. Intramedullary nail, left femur. Left hip arthrogram on 7/5/22. Patient reports he has been doing well since leaving the hospital, but has some occasional issues with pain control. He reports his pain is a 5/10 today. He has been ambulating with the walker but reports he still has some hesitation with bearing full weight. He denies fevers chills/wound erythema or drainage. [de-identified] : General:\par NAD\par AAOx4\par \par LLE:\par Well healed surgical incisions.\par Staples removed today and replaced with steri strips\par SILT L2-S1\par Gsc/TA/EHL/FHL intact\par DP/PT 2+\par No calf tenderness [de-identified] : 4-views of left femur: s/p intramedullary nail. Implants intact and in good position. Mild displacement of fracture since post-op, but stable and acceptable alignment. Increasing Evidence of callus formation. [de-identified] : Patient is a 52 year old male s/p left intertrochanteric nonunion repair with autograft. Intramedullary nail, left femur [de-identified] : Patient is recovering well. A prescription for pain medicine was provided today, but stressed that patient should start tapering narcotic medication and replacing with OTC Tylenol and Motrin. Patient may continue to be WBAT with no restrictions with the walker as needed. Patient should obtain an x-ray in 6 weeks to assess progression of healing and routine monitoring. I will call the patient with the results of the x-ray. If the patient has concerns or any further issues, he may alternatively follow-up in -person in our office. All questions were answered and patient expressed understanding.\par \par This note was written by Zarina Abdi MD PGY-5 Orthopaedic Surgery Resident acting as a scribe for Dr. Kay.\par \par Orthopaedic Trauma Surgeon Addendum:\par \par I agree with the above resident physician note.  \par Chart was reviewed and patient examined in the orthopedic office. I agree with the assessment and plan of the resident.\par \par I was physically present for the key portions of the evaluation and management service provided. I agree with the above history, physical and plan. Appropriate imaging has been reviewed and the plan adjusted as needed.\par \par Gary Kay MD\par Orthopaedic Trauma Surgeon\par [facility]\par [facility] [place]\par

## 2022-08-31 ENCOUNTER — APPOINTMENT (OUTPATIENT)
Dept: ULTRASOUND IMAGING | Facility: CLINIC | Age: 52
End: 2022-08-31

## 2022-08-31 PROCEDURE — 93970 EXTREMITY STUDY: CPT

## 2022-09-13 ENCOUNTER — NON-APPOINTMENT (OUTPATIENT)
Age: 52
End: 2022-09-13

## 2022-11-04 DIAGNOSIS — E11.9 TYPE 2 DIABETES MELLITUS W/OUT COMPLICATIONS: ICD-10-CM

## 2022-11-07 ENCOUNTER — NON-APPOINTMENT (OUTPATIENT)
Age: 52
End: 2022-11-07

## 2022-11-07 DIAGNOSIS — I48.0 PAROXYSMAL ATRIAL FIBRILLATION: ICD-10-CM

## 2022-11-07 DIAGNOSIS — K21.9 GASTRO-ESOPHAGEAL REFLUX DISEASE W/OUT ESOPHAGITIS: ICD-10-CM

## 2022-11-07 DIAGNOSIS — I10 ESSENTIAL (PRIMARY) HYPERTENSION: ICD-10-CM

## 2022-11-07 RX ORDER — CHROMIUM 200 MCG
TABLET ORAL
Refills: 0 | Status: ACTIVE | COMMUNITY

## 2022-11-07 RX ORDER — PNV NO.95/FERROUS FUM/FOLIC AC 28MG-0.8MG
TABLET ORAL
Refills: 0 | Status: ACTIVE | COMMUNITY

## 2022-12-07 ENCOUNTER — APPOINTMENT (OUTPATIENT)
Dept: ENDOCRINOLOGY | Facility: CLINIC | Age: 52
End: 2022-12-07

## 2022-12-07 VITALS
OXYGEN SATURATION: 99 % | BODY MASS INDEX: 29.09 KG/M2 | HEART RATE: 85 BPM | WEIGHT: 234 LBS | TEMPERATURE: 97.6 F | SYSTOLIC BLOOD PRESSURE: 128 MMHG | HEIGHT: 75 IN | DIASTOLIC BLOOD PRESSURE: 60 MMHG

## 2022-12-07 DIAGNOSIS — S72.002D FRACTURE OF UNSPECIFIED PART OF NECK OF LEFT FEMUR, SUBSEQUENT ENCOUNTER FOR CLOSED FRACTURE WITH ROUTINE HEALING: ICD-10-CM

## 2022-12-07 DIAGNOSIS — Z87.81 PERSONAL HISTORY OF (HEALED) TRAUMATIC FRACTURE: ICD-10-CM

## 2022-12-07 DIAGNOSIS — S72.001R: ICD-10-CM

## 2022-12-07 DIAGNOSIS — E10.43 TYPE 1 DIABETES MELLITUS WITH DIABETIC AUTONOMIC (POLY)NEUROPATHY: ICD-10-CM

## 2022-12-07 DIAGNOSIS — S72.92XA UNSPECIFIED FRACTURE OF LEFT FEMUR, INITIAL ENCOUNTER FOR CLOSED FRACTURE: ICD-10-CM

## 2022-12-07 DIAGNOSIS — Z13.820 ENCOUNTER FOR SCREENING FOR OSTEOPOROSIS: ICD-10-CM

## 2022-12-07 PROCEDURE — 99213 OFFICE O/P EST LOW 20 MIN: CPT

## 2022-12-07 RX ORDER — PIOGLITAZONE HYDROCHLORIDE 15 MG/1
15 TABLET ORAL
Refills: 0 | Status: DISCONTINUED | COMMUNITY
End: 2022-12-07

## 2022-12-07 RX ORDER — DOCUSATE SODIUM 100 MG/1
100 CAPSULE, LIQUID FILLED ORAL 3 TIMES DAILY
Qty: 90 | Refills: 0 | Status: DISCONTINUED | COMMUNITY
Start: 2022-07-21 | End: 2022-12-07

## 2022-12-07 RX ORDER — ACETAMINOPHEN 500 MG/1
500 TABLET, COATED ORAL
Qty: 42 | Refills: 0 | Status: DISCONTINUED | COMMUNITY
Start: 2022-07-21 | End: 2022-12-07

## 2022-12-07 RX ORDER — OXYCODONE 5 MG/1
5 TABLET ORAL
Qty: 15 | Refills: 0 | Status: DISCONTINUED | COMMUNITY
Start: 2022-07-26 | End: 2022-12-07

## 2022-12-07 RX ORDER — DESVENLAFAXINE SUCCINATE 25 MG/1
TABLET, EXTENDED RELEASE ORAL
Refills: 0 | Status: DISCONTINUED | COMMUNITY
End: 2022-12-07

## 2022-12-07 NOTE — REVIEW OF SYSTEMS
[Fatigue] : fatigue [Decreased Appetite] : decreased appetite [Fast Heart Rate] : fast heart rate [Nausea] : no nausea [Vomiting] : no vomiting [Muscle Weakness] : muscle weakness [Dizziness] : dizziness [Difficulty Walking] : difficulty walking [Pain/Numbness of Digits] : pain/numbness of digits [Easy Bleeding] : a ~M tendency for easy bleeding [Negative] : Endocrine [FreeTextEntry5] : Dizziness

## 2022-12-07 NOTE — HISTORY OF PRESENT ILLNESS
[FreeTextEntry1] : 49-year-old white male with a past medical history of type 1 diabetes who is currently on insulin Basaglar 16 units at night and insulin NovoLog 5 to 7 units before meals.  He he also has a history of hypertension and depression PAF status post ablation hyperlipidemia obstructive sleep apnea and recurrent episodes of syncope with orthostatism.  Patient claimed that recently he had a fall at home which resulted in a fracture of the left wrist.  He claims that he had felt dizzy after which he passed out.  This resulted in a fracture of the left wrist for which he was hospitalized but was discharged without any without any surgical intervention.  Patient still feels dizzy on occasions.  His sugar levels have not been low and in the morning they are approximately 100 mg per DL and after the meals up to 200 mg per DL.  He denies any hypoglycemic events.  He does have numbness of his extremities his vision is stable.  He denies any chest pain but he also has a shortness of breath on minimal exertion.\par \par \par \par \par \par

## 2022-12-07 NOTE — ASSESSMENT
[FreeTextEntry1] : Young white male with a history of insulin-dependent diabetes complicated with autonomic neuropathy status post recurrent falls with fractures of the left hip and the left wrist area.  Patient has orthostasis for which he is currently on medications which include midodrine and Florinef.  Glucose levels of the patient are still fluctuating as the patient does not take enough insulin dose secondary to fear from falling.  Recommendation\par 1.  Due to the history of recurrent falls I have advised the patient to obtain a bone density test to rule out any underlying osteoporosis.\par 2.  Patient will continue his current insulin regimen.\par 3.  I am also referring the patient for a complete blood test including hemoglobin A1c and a TSH level.\par 4.  Fall precautions and the control of diabetes was discussed with the patient. [Diabetes Foot Care] : diabetes foot care [Long Term Vascular Complications] : long term vascular complications of diabetes [Carbohydrate Consistent Diet] : carbohydrate consistent diet [Importance of Diet and Exercise] : importance of diet and exercise to improve glycemic control, achieve weight loss and improve cardiovascular health [Exercise/Effect on Glucose] : exercise/effect on glucose [Hypoglycemia Management] : hypoglycemia management [Self Monitoring of Blood Glucose] : self monitoring of blood glucose [Retinopathy Screening] : Patient was referred to ophthalmology for retinopathy screening

## 2022-12-07 NOTE — PHYSICAL EXAM
[Alert] : alert [No Acute Distress] : no acute distress [Normal Voice/Communication] : normal voice communication [Normal Sclera/Conjunctiva] : normal sclera/conjunctiva [No Neck Mass] : no neck mass was observed [Clear to Auscultation] : lungs were clear to auscultation bilaterally [Normal to Percussion] : lungs were normal to percussion [Normal S1, S2] : normal S1 and S2 [No Murmurs] : no murmurs [Normal Rate] : heart rate was normal [Regular Rhythm] : with a regular rhythm [No Edema] : no peripheral edema [Pedal Pulses Normal] : the pedal pulses are present [Normal Appearance] : normal in appearance [Normal Bowel Sounds] : normal bowel sounds [Not Tender] : non-tender [Soft] : abdomen soft [Normal Anterior Cervical Nodes] : no anterior cervical lymphadenopathy [No CVA Tenderness] : no ~M costovertebral angle tenderness [No Spinal Tenderness] : no spinal tenderness [No Stigmata of Cushings Syndrome] : no stigmata of Cushings Syndrome [FreeTextEntry1] : Deferred [de-identified] : Deferred [de-identified] : Patient ambulating with a cane left.  Wrist is in a soft cast [de-identified] : Patchy lesions of psoriasis [de-identified] : Decreased sensation to to pinprick over the lower extremities

## 2023-01-01 NOTE — PHYSICAL THERAPY INITIAL EVALUATION ADULT - LEVEL OF INDEPENDENCE: STAIR NEGOTIATION, REHAB EVAL
Discharge Instructions  You may not be sure when your baby is sick and needs to see a doctor, especially if this is your first baby.  DO call your clinic if you are worried about your baby s health.  Most clinics have a 24-hour nurse help line. They are able to answer your questions or reach your doctor 24 hours a day. It is best to call your doctor or clinic instead of the hospital. We are here to help you.    Call 911 if your baby:  Is limp and floppy  Has  stiff arms or legs or repeated jerking movements  Arches his or her back repeatedly  Has a high-pitched cry  Has bluish skin  or looks very pale    Call your baby s doctor or go to the emergency room right away if your baby:  Has a high fever: Rectal temperature of 100.4 degrees F (38 degrees C) or higher or underarm temperature of 99 degree F (37.2 C) or higher.  Has skin that looks yellow, and the baby seems very sleepy.  Has an infection (redness, swelling, pain) around the umbilical cord or circumcised penis OR bleeding that does not stop after a few minutes.    Call your baby s clinic if you notice:  A low rectal temperature of (97.5 degrees F or 36.4 degree C).  Changes in behavior.  For example, a normally quiet baby is very fussy and irritable all day, or an active baby is very sleepy and limp.  Vomiting. This is not spitting up after feedings, which is normal, but actually throwing up the contents of the stomach.  Diarrhea (watery stools) or constipation (hard, dry stools that are difficult to pass).  stools are usually quite soft but should not be watery.  Blood or mucus in the stools.  Coughing or breathing changes (fast breathing, forceful breathing, or noisy breathing after you clear mucus from the nose).  Feeding problems with a lot of spitting up.  Your baby does not want to feed for more than 6 to 8 hours or has fewer diapers than expected in a 24 hour period.  Refer to the feeding log for expected number of wet diapers in the  first days of life.    If you have any concerns about hurting yourself of the baby, call your doctor right away.      Baby's Birth Weight: 6 lb 13.7 oz (3110 g)  Baby's Discharge Weight: 2.948 kg (6 lb 8 oz)    Recent Labs   Lab Test 23   DBIL <0.20   BILITOTAL 4.9       Immunization History   Administered Date(s) Administered    Hepatits B (Peds <19Y) 2023       Hearing Screen Date: 23   Hearing Screen, Left Ear: passed  Hearing Screen, Right Ear: passed     Umbilical Cord:  drying    Pulse Oximetry Screen Result: pass  (right arm): 99 %  (foot): 99 %        Date and Time of [place] Metabolic Screen: 23       I have checked to make sure that this is my baby.   unsafe/unable to perform

## 2023-06-05 ENCOUNTER — APPOINTMENT (OUTPATIENT)
Dept: ENDOCRINOLOGY | Facility: CLINIC | Age: 53
End: 2023-06-05
Payer: COMMERCIAL

## 2023-06-05 VITALS
BODY MASS INDEX: 28.23 KG/M2 | DIASTOLIC BLOOD PRESSURE: 62 MMHG | TEMPERATURE: 97.2 F | WEIGHT: 227 LBS | HEIGHT: 75 IN | SYSTOLIC BLOOD PRESSURE: 118 MMHG | HEART RATE: 106 BPM | OXYGEN SATURATION: 98 %

## 2023-06-05 DIAGNOSIS — N18.31 TYPE 1 DIABETES MELLITUS WITH DIABETIC NEPHROPATHY: ICD-10-CM

## 2023-06-05 DIAGNOSIS — E78.5 HYPERLIPIDEMIA, UNSPECIFIED: ICD-10-CM

## 2023-06-05 DIAGNOSIS — E10.21 TYPE 1 DIABETES MELLITUS WITH DIABETIC NEPHROPATHY: ICD-10-CM

## 2023-06-05 DIAGNOSIS — R29.6 REPEATED FALLS: ICD-10-CM

## 2023-06-05 PROCEDURE — 99214 OFFICE O/P EST MOD 30 MIN: CPT

## 2023-06-05 RX ORDER — ROSUVASTATIN CALCIUM 5 MG/1
5 TABLET, FILM COATED ORAL DAILY
Qty: 90 | Refills: 3 | Status: ACTIVE | COMMUNITY
Start: 2023-06-05 | End: 1900-01-01

## 2023-06-05 NOTE — HISTORY OF PRESENT ILLNESS
[FreeTextEntry1] : 52-year-old white male with a past medical history of insulin-dependent diabetes complicated with autonomic neuropathy hypertension with recurrent episodes of syncope presents for routine follow-up and evaluation at patient is currently on insulin Basaglar anywhere between 10 to 16 units at night and NovoLog insulin 5 to 7 units before meals, rosuvastatin 5 mg daily and midodrine, vitamin B12 and sore.  Currently the patient his sugar levels have been fairly stable however he recently had an abrasion to the left toe secondary to a plastic object which was stuck in the toes.  He was unable to feel it and apparently he had it for a few days and then he noted that there was swelling and redness of the left toe.  He was evaluated by a podiatrist and was treated with antibiotics and local wound care.  His sugar levels have been fluctuating.  He claimed that he occasionally has episodes of hypoglycemia especially at night without any reason.  He does become symptomatic and he takes some orange juice which helps to relieve his symptoms.  He still has the numbness of his lower extremities occasional dizziness with difficulty in ambulating.  He needs to ambulate with the help of a cane.  He is eating 3 meals daily his vision has been stable.  He denies chest pains shortness of breath nausea vomiting headache.  Past medical history significant for depression, hypertension, fatty liver, gastroesophageal reflux disease, obstructive sleep apnea, paroxysmal atrial fibrillation and seizure disorder.

## 2023-06-05 NOTE — REVIEW OF SYSTEMS
[Dizziness] : dizziness [Difficulty Walking] : difficulty walking [Pain/Numbness of Digits] : pain/numbness of digits [Poor Balance] : poor balance

## 2023-06-05 NOTE — PHYSICAL EXAM
[Alert] : alert [Well Nourished] : well nourished [Normal Sclera/Conjunctiva] : normal sclera/conjunctiva [EOMI] : extra ocular movement intact [PERRL] : pupils equal, round and reactive to light [Normal Outer Ear/Nose] : the ears and nose were normal in appearance [Normal Hearing] : hearing was normal [No Neck Mass] : no neck mass was observed [Thyroid Not Enlarged] : the thyroid was not enlarged [No Respiratory Distress] : no respiratory distress [Clear to Auscultation] : lungs were clear to auscultation bilaterally [Carotids Normal] : carotid pulses were normal with no bruits [Pedal Pulses Normal] : the pedal pulses are present [Not Tender] : non-tender [Soft] : abdomen soft [No HSM] : no hepato-splenomegaly [Normal Supraclavicular Nodes] : no supraclavicular lymphadenopathy [Normal Anterior Cervical Nodes] : no anterior cervical lymphadenopathy [Normal Posterior Cervical Nodes] : no posterior cervical lymphadenopathy [No CVA Tenderness] : no ~M costovertebral angle tenderness [Foot Ulcers] : no foot ulcers [No Tremors] : no tremors [Oriented x3] : oriented to person, place, and time [FreeTextEntry1] : Deferred [de-identified] : Deferred [de-identified] : Deferred [de-identified] : Fluctuating glucose levels [de-identified] : Healed ulcer over the tip of the left toeWith a dry callus and no discharges. [de-identified] : Decree sensation over both of the lower extremities

## 2023-06-05 NOTE — ASSESSMENT
[Diabetes Foot Care] : diabetes foot care [Long Term Vascular Complications] : long term vascular complications of diabetes [Carbohydrate Consistent Diet] : carbohydrate consistent diet [Exercise/Effect on Glucose] : exercise/effect on glucose [Importance of Diet and Exercise] : importance of diet and exercise to improve glycemic control, achieve weight loss and improve cardiovascular health [Hypoglycemia Management] : hypoglycemia management [Self Monitoring of Blood Glucose] : self monitoring of blood glucose [Retinopathy Screening] : Patient was referred to ophthalmology for retinopathy screening [FreeTextEntry1] : Young white male with past medical history of insulin-dependent diabetes complicated with severe peripheral neuropathy who recently developed an ulcer on the left foot which apparently is now healing.  Patient 's insulin-dependent diabetes is complicated with severe peripheral neuropathy, poor balance, recurrent falls and seizure disorder.  Latest blood test from 5/27/2023 show hemoglobin A1c of 7.9% BUN is 32 creatinine is 1.84 estimated GFR is 43 LDL is 110 and total cholesterol of 216 mg per DL.  Recommendation\par Patient was advised to increase his insulin coverage but he is afraid of taking high doses of insulin due to the episodes of hypoglycemic unawareness with syncopal episodes.  He was also offered to use continuous glucose monitoring system but this also he is refusing at the present time.\par 2.  Patient will continue with his Basaglar insulin 10 to 16 units at night depending upon his glucose reading and the NovoLog 5 to 8 units before meals.\par 3.  Patient will continue to follow-up with the podiatrist for ongoing treatment of the left toe diabetic ulcer which is currently healing without any active discharge.\par 4.  Regarding the patient's chronic kidney disease I have referred the patient to the nephrologist for further evaluation and treatment.  The above plan was discussed in detail with the patient.  He will return to the clinic in 3 months time with a repeat blood analysis.  Thank you

## 2023-06-20 NOTE — CHART NOTE - NSCHARTNOTEFT_GEN_A_CORE
Source: Patient [x ]  Family [ ]   other [ x]    Current Diet: Diet, Consistent Carbohydrate w/Evening Snack (07-05-22 @ 15:52)\    Patient reports [ ] nausea  [ ] vomiting [ ] diarrhea [ ] constipation  [ ]chewing problems [ ] swallowing issues  [ ] other:     PO intake:  < 50% [ ]   50-75%  [x ]   %  [ ]  other :    Source for PO intake [ x] Patient [ ] family [x ] chart [ ] staff [ ] other    Current Weight:   (7/5) 229.2 lbs  No new weight  Noted with left leg nonpitting edema, continue to trend    Pertinent Medications: MEDICATIONS  (STANDING):  aMIOdarone    Tablet 100 milliGRAM(s) Oral daily  aspirin enteric coated 81 milliGRAM(s) Oral daily  atorvastatin 20 milliGRAM(s) Oral at bedtime  cyanocobalamin 1000 MICROGram(s) Oral daily  dextrose 5%. 1000 milliLiter(s) (50 mL/Hr) IV Continuous <Continuous>  dextrose 5%. 1000 milliLiter(s) (100 mL/Hr) IV Continuous <Continuous>  dextrose 50% Injectable 25 Gram(s) IV Push once  dextrose 50% Injectable 25 Gram(s) IV Push once  dextrose 50% Injectable 12.5 Gram(s) IV Push once  enoxaparin Injectable 40 milliGRAM(s) SubCutaneous every 24 hours  ferrous    sulfate 325 milliGRAM(s) Oral two times a day  folic acid 1 milliGRAM(s) Oral daily  glucagon  Injectable 1 milliGRAM(s) IntraMuscular once  insulin glargine Injectable (LANTUS) 15 Unit(s) SubCutaneous two times a day  insulin lispro (ADMELOG) corrective regimen sliding scale   SubCutaneous Before meals and at bedtime  levothyroxine 75 MICROGram(s) Oral daily  midodrine. 15 milliGRAM(s) Oral every 8 hours    MEDICATIONS  (PRN):  bisacodyl Suppository 10 milliGRAM(s) Rectal daily PRN If no bowel movement  dextrose Oral Gel 15 Gram(s) Oral once PRN Blood Glucose LESS THAN 70 milliGRAM(s)/deciliter  magnesium hydroxide Suspension 30 milliLiter(s) Oral daily PRN Constipation  ondansetron Injectable 4 milliGRAM(s) IV Push every 6 hours PRN Nausea and/or Vomiting  oxyCODONE    IR 10 milliGRAM(s) Oral every 3 hours PRN Moderate Pain (4 - 6)    Pertinent Labs: CBC Full  -  ( 15 Jul 2022 07:11 )  WBC Count : 8.36 K/uL  RBC Count : 2.80 M/uL  Hemoglobin : 8.7 g/dL  Hematocrit : 26.5 %  Platelet Count - Automated : 292 K/uL  Mean Cell Volume : 94.6 fl  Mean Cell Hemoglobin : 31.1 pg  Mean Cell Hemoglobin Concentration : 32.8 gm/dL  Auto Neutrophil # : x  Auto Lymphocyte # : x  Auto Monocyte # : x  Auto Eosinophil # : x  Auto Basophil # : x  Auto Neutrophil % : x  Auto Lymphocyte % : x  Auto Monocyte % : x  Auto Eosinophil % : x  Auto Basophil % : x    07-15 Na134 mmol/L<L> Glu 211 mg/dL<H> K+ 4.3 mmol/L Cr  1.41 mg/dL<H> BUN 17.1 mg/dL Phos n/a   Alb 2.7 g/dL<L> PAB n/a       Skin: Surgical incision to left hip per documentation  Sam: 20    Nutrition focused physical exam previously conducted - found signs of malnutrition [ ]absent [ x]present    Subcutaneous fat loss: [x ] Orbital fat pads region, [x ]Buccal fat region, [ ]Triceps region,  [ ]Ribs region    Muscle wasting: [x ]Temples region, [x ]Clavicle region, [ ]Shoulder region, [ ]Scapula region, [ ]Interosseous region,  [ ]thigh region, [ ]Calf region    Estimated Needs:   [x ] no change since previous assessment  [ ] recalculated:     Current Nutrition Diagnosis: Pt remains at high nutrition risk secondary to malnutrition (moderate, chronic) related to uncontrolled DM and inconsistent meals as evidenced by mild muscle loss of temples and clavicles, mild fat loss of orbitals and buccal pads, 8.4% wt loss x ~9 months, meeting </75% nutrient needs >/1 month. Pt with fair appetite/po intake at this time, c/o food tasting bland. Otherwise, pt with no nutrition-related concerns/questions at this time. RD to follow up.      Recommendations:   1) Continue diet as tolerated.  2) Add Glucerna TID to optimize po intake and provide an additional 220 kcal, 10g protein per serving.  3) Continue vitamin B12, folic acid, and ferrous sulfate supplementation; add MVI and vitamin C 500mg daily.   4) Encourage po intake, monitor diet tolerance, and provide assistance at meals as needed.  5) Obtain daily weights to monitor trends.    Monitoring and Evaluation:   [ x] PO intake [x ] Tolerance to diet prescription [X] Weights  [X] Follow up per protocol [X] Labs Clear bilaterally, pupils equal, round and reactive to light.

## 2023-09-11 RX ORDER — INSULIN GLARGINE 100 [IU]/ML
100 INJECTION, SOLUTION SUBCUTANEOUS
Qty: 1 | Refills: 3 | Status: ACTIVE | COMMUNITY
Start: 1900-01-01 | End: 1900-01-01

## 2023-09-11 RX ORDER — GLUCAGON 1 MG
1 KIT INJECTION
Qty: 1 | Refills: 3 | Status: ACTIVE | COMMUNITY
Start: 2022-11-04 | End: 1900-01-01

## 2023-12-05 LAB — HBA1C MFR BLD HPLC: 8.4

## 2024-02-12 ENCOUNTER — APPOINTMENT (OUTPATIENT)
Dept: ENDOCRINOLOGY | Facility: CLINIC | Age: 54
End: 2024-02-12
Payer: COMMERCIAL

## 2024-02-12 VITALS
DIASTOLIC BLOOD PRESSURE: 58 MMHG | BODY MASS INDEX: 31.46 KG/M2 | HEIGHT: 75 IN | WEIGHT: 253 LBS | SYSTOLIC BLOOD PRESSURE: 90 MMHG | TEMPERATURE: 97.9 F

## 2024-02-12 DIAGNOSIS — E11.9 TYPE 2 DIABETES MELLITUS W/OUT COMPLICATIONS: ICD-10-CM

## 2024-02-12 DIAGNOSIS — E10.42 TYPE 1 DIABETES MELLITUS WITH DIABETIC POLYNEUROPATHY: ICD-10-CM

## 2024-02-12 DIAGNOSIS — K76.0 FATTY (CHANGE OF) LIVER, NOT ELSEWHERE CLASSIFIED: ICD-10-CM

## 2024-02-12 PROCEDURE — 99214 OFFICE O/P EST MOD 30 MIN: CPT

## 2024-02-12 PROCEDURE — G2211 COMPLEX E/M VISIT ADD ON: CPT | Mod: NC,1L

## 2024-02-12 RX ORDER — GABAPENTIN 100 MG/1
100 CAPSULE ORAL
Qty: 90 | Refills: 2 | Status: ACTIVE | COMMUNITY
Start: 2024-02-12 | End: 1900-01-01

## 2024-02-12 NOTE — ASSESSMENT
[Diabetes Foot Care] : diabetes foot care [Long Term Vascular Complications] : long term vascular complications of diabetes [Carbohydrate Consistent Diet] : carbohydrate consistent diet [Importance of Diet and Exercise] : importance of diet and exercise to improve glycemic control, achieve weight loss and improve cardiovascular health [Exercise/Effect on Glucose] : exercise/effect on glucose [Hypoglycemia Management] : hypoglycemia management [Self Monitoring of Blood Glucose] : self monitoring of blood glucose [Retinopathy Screening] : Patient was referred to ophthalmology for retinopathy screening [FreeTextEntry1] : White male who has a past medical history of insulin-dependent diabetes complicated with autonomic neuropathy, history of syncope and fractures of the hip now is experiencing difficulty in ambulating and she loss of sensation of the both lower extremities.  Physically he is not active and is very difficult for him to walk due to episodes of hypotension and syncope in the past his glucose level is fairly stable his most recent blood test from January 12 of this year shows lipid panel is normal total cholesterol 123 LDL of 51 urine for albumin/creatinine ratio is normal his hemoglobin A1c is 7.3% basic metabolic panel shows a creatinine of 2.07 and the GFR is now down to 37 potassium is low at 3.1 and AST is 69.  Patient is not developing progressive which could be due to the lack of adequate water intake.  Recommendation 1.  I have advised him of a nephrologist.  He was given the name of a local nephro so they can evaluate his kidney function. 2.  Patient will continue with the current diabetic medications which include insulin Basaglar 10 to 16 units every day, NovoLog 5-7 meals and also levothyroxine 50 mcg tablets every day.  Also starting the patient on a low-dose of gabapentin 100 mg tablets at night side effects of the medication were explained to the patient in detail. 3.  If the patient condition stable he will then return for follow-up evaluation in 3 to months.  Plan discussed with the patient thank you

## 2024-02-12 NOTE — PHYSICAL EXAM
[Alert] : alert [Well Nourished] : well nourished [No Acute Distress] : no acute distress [Normal Sclera/Conjunctiva] : normal sclera/conjunctiva [EOMI] : extra ocular movement intact [No Proptosis] : no proptosis [Normal Outer Ear/Nose] : the ears and nose were normal in appearance [Normal Oropharynx] : the oropharynx was normal [No Neck Mass] : no neck mass was observed [Thyroid Not Enlarged] : the thyroid was not enlarged [No Respiratory Distress] : no respiratory distress [No Thyroid Nodules] : no palpable thyroid nodules [No Accessory Muscle Use] : no accessory muscle use [Clear to Auscultation] : lungs were clear to auscultation bilaterally [Normal S1, S2] : normal S1 and S2 [Normal Rate] : heart rate was normal [Regular Rhythm] : with a regular rhythm [Carotids Normal] : carotid pulses were normal with no bruits [No Edema] : no peripheral edema [Pedal Pulses Normal] : the pedal pulses are present [Normal Bowel Sounds] : normal bowel sounds [Not Tender] : non-tender [Not Distended] : not distended [Soft] : abdomen soft [Normal Supraclavicular Nodes] : no supraclavicular lymphadenopathy [Normal Anterior Cervical Nodes] : no anterior cervical lymphadenopathy [Normal Posterior Cervical Nodes] : no posterior cervical lymphadenopathy [No Spinal Tenderness] : no spinal tenderness [Spine Straight] : spine straight [No Stigmata of Cushings Syndrome] : no stigmata of Cushings Syndrome [No Clubbing, Cyanosis] : no clubbing  or cyanosis of the fingernails [No Joint Swelling] : no joint swelling seen [Normal Strength/Tone] : muscle strength and tone were normal [No Rash] : no rash [Acanthosis Nigricans] : no acanthosis nigricans [No Motor Deficits] : the motor exam was normal [Normal Reflexes] : deep tendon reflexes were 2+ and symmetric [No Tremors] : no tremors [Oriented x3] : oriented to person, place, and time [Normal Insight/Judgement] : insight and judgment were intact [de-identified] : Significantly diminished sensation to light touch and pinprick bilaterally below the knees

## 2024-02-12 NOTE — REVIEW OF SYSTEMS
[Fatigue] : fatigue [Recent Weight Gain (___ Lbs)] : recent weight gain: [unfilled] lbs [Dysphagia] : no dysphagia [Dysphonia] : no dysphonia [Polyuria] : polyuria [Nocturia] : nocturia [Muscle Weakness] : muscle weakness [Dizziness] : dizziness [Confusion] : no confusion [Difficulty Walking] : difficulty walking [Tremors] : no tremors [Pain/Numbness of Digits] : pain/numbness of digits [Poor Balance] : poor balance [Negative] : Heme/Lymph

## 2024-02-12 NOTE — HISTORY OF PRESENT ILLNESS
[FreeTextEntry1] : 53-year-old young male with a past medical history of insulin dependent diabetes which is complicated with neuropathy.  Patient has severe dizziness when he stands and he had episodes of syncope and fractures of the hip.  Patient is currently taking insulin Basaglar 10 to 16 units every day at night, NovoLog insulin 5 to 7 units before meals, rosuvastatin 5 mg daily, midodrine 10 mg as needed, levothyroxine 50 mcg daily, may be aspirating, vitamin D3 and folic acid.  According to the patient he had feeling severe numbness of the lower extremities below the knee area.  He feels lightheaded and difficulty in ambulating.  He figures sticks in the morning are between 1 20-1 30 and 63 mg per.  He claims that his vision has been stable and he really has had episodes of hypoglycemia.  He occasionally experiences panic attacks.  He claimed that he does need 2-3 meals every day and is not ambulatory due to the fear of falls.  Review of systems otherwise negative for head abdominal pain.

## 2024-05-09 ENCOUNTER — RESULT REVIEW (OUTPATIENT)
Age: 54
End: 2024-05-09

## 2024-05-11 ENCOUNTER — TRANSCRIPTION ENCOUNTER (OUTPATIENT)
Age: 54
End: 2024-05-11

## 2024-05-13 ENCOUNTER — TRANSCRIPTION ENCOUNTER (OUTPATIENT)
Age: 54
End: 2024-05-13

## 2024-07-04 ENCOUNTER — NON-APPOINTMENT (OUTPATIENT)
Age: 54
End: 2024-07-04

## 2024-07-23 ENCOUNTER — NON-APPOINTMENT (OUTPATIENT)
Age: 54
End: 2024-07-23

## 2024-07-23 ENCOUNTER — APPOINTMENT (OUTPATIENT)
Dept: OPHTHALMOLOGY | Facility: CLINIC | Age: 54
End: 2024-07-23
Payer: COMMERCIAL

## 2024-07-23 PROCEDURE — 92004 COMPRE OPH EXAM NEW PT 1/>: CPT

## 2024-07-23 PROCEDURE — 92134 CPTRZ OPH DX IMG PST SGM RTA: CPT

## 2024-07-24 ENCOUNTER — APPOINTMENT (OUTPATIENT)
Dept: ENDOCRINOLOGY | Facility: CLINIC | Age: 54
End: 2024-07-24

## 2024-11-21 ENCOUNTER — APPOINTMENT (OUTPATIENT)
Dept: ENDOCRINOLOGY | Facility: CLINIC | Age: 54
End: 2024-11-21
Payer: COMMERCIAL

## 2024-11-21 VITALS
WEIGHT: 225 LBS | DIASTOLIC BLOOD PRESSURE: 56 MMHG | SYSTOLIC BLOOD PRESSURE: 94 MMHG | OXYGEN SATURATION: 95 % | HEART RATE: 117 BPM | BODY MASS INDEX: 27.98 KG/M2 | HEIGHT: 75 IN

## 2024-11-21 DIAGNOSIS — N18.31 TYPE 1 DIABETES MELLITUS WITH DIABETIC CHRONIC KIDNEY DISEASE: ICD-10-CM

## 2024-11-21 DIAGNOSIS — R63.4 ABNORMAL WEIGHT LOSS: ICD-10-CM

## 2024-11-21 DIAGNOSIS — E10.42 TYPE 1 DIABETES MELLITUS WITH DIABETIC POLYNEUROPATHY: ICD-10-CM

## 2024-11-21 DIAGNOSIS — E10.22 TYPE 1 DIABETES MELLITUS WITH DIABETIC CHRONIC KIDNEY DISEASE: ICD-10-CM

## 2024-11-21 PROCEDURE — G2211 COMPLEX E/M VISIT ADD ON: CPT | Mod: NC

## 2024-11-21 PROCEDURE — 99214 OFFICE O/P EST MOD 30 MIN: CPT

## 2024-11-25 DIAGNOSIS — G62.9 POLYNEUROPATHY, UNSPECIFIED: ICD-10-CM

## 2024-11-25 DIAGNOSIS — R73.09 OTHER ABNORMAL GLUCOSE: ICD-10-CM

## 2024-11-25 DIAGNOSIS — Z86.39 PERSONAL HISTORY OF OTHER ENDOCRINE, NUTRITIONAL AND METABOLIC DISEASE: ICD-10-CM

## 2024-11-25 DIAGNOSIS — Z86.79 PERSONAL HISTORY OF OTHER DISEASES OF THE CIRCULATORY SYSTEM: ICD-10-CM

## 2024-11-25 RX ORDER — SPIRONOLACTONE 100 MG/1
100 TABLET ORAL TWICE DAILY
Refills: 0 | Status: ACTIVE | COMMUNITY

## 2024-11-25 RX ORDER — LEVOTHYROXINE SODIUM 0.07 MG/1
75 TABLET ORAL
Qty: 30 | Refills: 0 | Status: ACTIVE | COMMUNITY
Start: 2024-11-01

## 2024-11-25 RX ORDER — MAGNESIUM OXIDE 241.3 MG/1000MG
400 TABLET ORAL
Qty: 15 | Refills: 0 | Status: ACTIVE | COMMUNITY
Start: 2024-08-30

## 2024-11-25 RX ORDER — MIDODRINE HYDROCHLORIDE 10 MG/1
10 TABLET ORAL
Refills: 0 | Status: ACTIVE | COMMUNITY

## 2024-11-25 RX ORDER — CEPHALEXIN 500 MG/1
500 CAPSULE ORAL
Refills: 0 | Status: ACTIVE | COMMUNITY

## 2024-11-25 RX ORDER — POTASSIUM CHLORIDE 1500 MG/1
20 TABLET, EXTENDED RELEASE ORAL
Qty: 30 | Refills: 0 | Status: COMPLETED | COMMUNITY
Start: 2024-11-04

## 2024-11-25 RX ORDER — CARVEDILOL 3.12 MG/1
3.12 TABLET, FILM COATED ORAL
Qty: 60 | Refills: 0 | Status: COMPLETED | COMMUNITY
Start: 2024-08-24

## 2024-11-25 RX ORDER — INSULIN ASPART 100 [IU]/ML
INJECTION, SOLUTION INTRAVENOUS; SUBCUTANEOUS
Refills: 0 | Status: ACTIVE | COMMUNITY

## 2024-11-25 RX ORDER — GINGER ROOT/GINGER ROOT EXT 262.5 MG
5000 CAPSULE ORAL DAILY
Refills: 0 | Status: ACTIVE | COMMUNITY

## 2024-11-25 RX ORDER — INSULIN GLARGINE 100 [IU]/ML
100 INJECTION, SOLUTION SUBCUTANEOUS
Refills: 0 | Status: ACTIVE | COMMUNITY

## 2024-11-25 RX ORDER — LEVOTHYROXINE SODIUM 0.05 MG/1
50 TABLET ORAL
Qty: 90 | Refills: 0 | Status: COMPLETED | COMMUNITY
Start: 2024-08-06

## 2024-11-25 RX ORDER — CHOLECALCIFEROL (VITAMIN D3) 125 MCG
125 MCG TABLET ORAL
Qty: 30 | Refills: 0 | Status: COMPLETED | COMMUNITY
Start: 2023-08-01

## 2024-11-25 RX ORDER — FUROSEMIDE 20 MG/1
20 TABLET ORAL
Refills: 0 | Status: ACTIVE | COMMUNITY

## 2024-11-25 RX ORDER — CHLORHEXIDINE GLUCONATE 4 %
325 (65 FE) LIQUID (ML) TOPICAL
Qty: 90 | Refills: 0 | Status: COMPLETED | COMMUNITY
Start: 2023-10-14

## 2024-11-25 RX ORDER — POTASSIUM CHLORIDE 750 MG/1
10 TABLET, FILM COATED, EXTENDED RELEASE ORAL
Qty: 30 | Refills: 0 | Status: COMPLETED | COMMUNITY
Start: 2024-11-08

## 2024-11-25 RX ORDER — MULTIVIT-MIN/IRON/FOLIC ACID/K 18-600-40
400 CAPSULE ORAL TWICE DAILY
Refills: 0 | Status: COMPLETED | COMMUNITY

## 2024-11-25 RX ORDER — FLUDROCORTISONE ACETATE 0.1 MG/1
0.1 TABLET ORAL
Refills: 0 | Status: ACTIVE | COMMUNITY

## 2024-11-25 RX ORDER — FERROUS SULFATE TAB EC 325 MG (65 MG FE EQUIVALENT) 325 (65 FE) MG
325 (65 FE) TABLET DELAYED RESPONSE ORAL
Qty: 90 | Refills: 0 | Status: COMPLETED | COMMUNITY
Start: 2024-10-18

## 2024-11-25 RX ORDER — SODIUM SULFATE, POTASSIUM SULFATE AND MAGNESIUM SULFATE 1.6; 3.13; 17.5 G/177ML; G/177ML; G/177ML
17.5-3.13-1.6 SOLUTION ORAL
Qty: 354 | Refills: 0 | Status: COMPLETED | COMMUNITY
Start: 2024-11-19

## 2024-11-25 RX ORDER — RIFAXIMIN 550 MG/1
550 TABLET ORAL TWICE DAILY
Refills: 0 | Status: ACTIVE | COMMUNITY

## 2024-12-12 ENCOUNTER — APPOINTMENT (OUTPATIENT)
Dept: MAMMOGRAPHY | Facility: CLINIC | Age: 54
End: 2024-12-12
Payer: MEDICARE

## 2024-12-12 ENCOUNTER — APPOINTMENT (OUTPATIENT)
Dept: ULTRASOUND IMAGING | Facility: CLINIC | Age: 54
End: 2024-12-12

## 2024-12-12 PROCEDURE — 76642 ULTRASOUND BREAST LIMITED: CPT | Mod: 50

## 2024-12-12 PROCEDURE — G0279: CPT

## 2024-12-12 PROCEDURE — 77066 DX MAMMO INCL CAD BI: CPT

## 2024-12-17 ENCOUNTER — RX RENEWAL (OUTPATIENT)
Age: 54
End: 2024-12-17

## 2024-12-23 RX ORDER — INSULIN ASPART 100 [IU]/ML
100 INJECTION, SOLUTION INTRAVENOUS; SUBCUTANEOUS
Qty: 2 | Refills: 1 | Status: ACTIVE | COMMUNITY
Start: 2024-12-23 | End: 1900-01-01

## 2025-02-28 ENCOUNTER — APPOINTMENT (OUTPATIENT)
Dept: ENDOCRINOLOGY | Facility: CLINIC | Age: 55
End: 2025-02-28
Payer: MEDICARE

## 2025-02-28 VITALS
HEIGHT: 75 IN | DIASTOLIC BLOOD PRESSURE: 62 MMHG | HEART RATE: 93 BPM | OXYGEN SATURATION: 97 % | TEMPERATURE: 96 F | WEIGHT: 210 LBS | BODY MASS INDEX: 26.11 KG/M2 | SYSTOLIC BLOOD PRESSURE: 118 MMHG

## 2025-02-28 DIAGNOSIS — E03.9 HYPOTHYROIDISM, UNSPECIFIED: ICD-10-CM

## 2025-02-28 DIAGNOSIS — K76.0 FATTY (CHANGE OF) LIVER, NOT ELSEWHERE CLASSIFIED: ICD-10-CM

## 2025-02-28 DIAGNOSIS — E11.9 TYPE 2 DIABETES MELLITUS W/OUT COMPLICATIONS: ICD-10-CM

## 2025-02-28 DIAGNOSIS — Z86.39 PERSONAL HISTORY OF OTHER ENDOCRINE, NUTRITIONAL AND METABOLIC DISEASE: ICD-10-CM

## 2025-02-28 DIAGNOSIS — R73.09 OTHER ABNORMAL GLUCOSE: ICD-10-CM

## 2025-02-28 DIAGNOSIS — E78.5 HYPERLIPIDEMIA, UNSPECIFIED: ICD-10-CM

## 2025-02-28 DIAGNOSIS — E10.42 TYPE 1 DIABETES MELLITUS WITH DIABETIC POLYNEUROPATHY: ICD-10-CM

## 2025-02-28 DIAGNOSIS — R73.9 HYPERGLYCEMIA, UNSPECIFIED: ICD-10-CM

## 2025-02-28 PROCEDURE — G2211 COMPLEX E/M VISIT ADD ON: CPT

## 2025-02-28 PROCEDURE — 99204 OFFICE O/P NEW MOD 45 MIN: CPT

## 2025-03-03 PROBLEM — R73.9 HYPERGLYCEMIA: Status: ACTIVE | Noted: 2025-03-03

## 2025-03-03 LAB — HBA1C MFR BLD HPLC: 8.4

## 2025-03-24 ENCOUNTER — RX RENEWAL (OUTPATIENT)
Age: 55
End: 2025-03-24

## 2025-06-02 ENCOUNTER — RESULT REVIEW (OUTPATIENT)
Age: 55
End: 2025-06-02

## 2025-07-15 ENCOUNTER — APPOINTMENT (OUTPATIENT)
Dept: ENDOCRINOLOGY | Facility: CLINIC | Age: 55
End: 2025-07-15
Payer: MEDICARE

## 2025-07-15 VITALS
DIASTOLIC BLOOD PRESSURE: 64 MMHG | BODY MASS INDEX: 28.6 KG/M2 | WEIGHT: 230 LBS | HEART RATE: 101 BPM | OXYGEN SATURATION: 97 % | SYSTOLIC BLOOD PRESSURE: 112 MMHG | HEIGHT: 75 IN | TEMPERATURE: 98.7 F

## 2025-07-15 PROCEDURE — 99213 OFFICE O/P EST LOW 20 MIN: CPT

## 2025-07-15 RX ORDER — LEVOTHYROXINE SODIUM 0.09 MG/1
88 TABLET ORAL DAILY
Qty: 90 | Refills: 1 | Status: ACTIVE | COMMUNITY
Start: 2025-07-15 | End: 1900-01-01

## 2025-07-22 ENCOUNTER — APPOINTMENT (OUTPATIENT)
Dept: OPHTHALMOLOGY | Facility: CLINIC | Age: 55
End: 2025-07-22

## 2025-08-01 ENCOUNTER — RESULT REVIEW (OUTPATIENT)
Age: 55
End: 2025-08-01

## 2025-08-01 ENCOUNTER — APPOINTMENT (OUTPATIENT)
Dept: MRI IMAGING | Facility: CLINIC | Age: 55
End: 2025-08-01
Payer: MEDICARE

## 2025-08-01 PROCEDURE — A9585: CPT | Mod: JW

## 2025-08-01 PROCEDURE — 74183 MRI ABD W/O CNTR FLWD CNTR: CPT

## 2025-08-11 ENCOUNTER — APPOINTMENT (OUTPATIENT)
Dept: OPHTHALMOLOGY | Facility: CLINIC | Age: 55
End: 2025-08-11
Payer: MEDICARE

## 2025-08-11 ENCOUNTER — NON-APPOINTMENT (OUTPATIENT)
Age: 55
End: 2025-08-11

## 2025-08-11 ENCOUNTER — APPOINTMENT (OUTPATIENT)
Dept: OPHTHALMOLOGY | Facility: CLINIC | Age: 55
End: 2025-08-11
Payer: SELF-PAY

## 2025-08-11 PROCEDURE — 92134 CPTRZ OPH DX IMG PST SGM RTA: CPT

## 2025-08-11 PROCEDURE — 92015 DETERMINE REFRACTIVE STATE: CPT

## 2025-08-11 PROCEDURE — 92004 COMPRE OPH EXAM NEW PT 1/>: CPT

## 2025-08-21 ENCOUNTER — RESULT REVIEW (OUTPATIENT)
Age: 55
End: 2025-08-21

## (undated) DEVICE — Device

## (undated) DEVICE — DRSG PREVENA PLUS SYSTEM